# Patient Record
Sex: MALE | Race: WHITE | Employment: FULL TIME | URBAN - METROPOLITAN AREA
[De-identification: names, ages, dates, MRNs, and addresses within clinical notes are randomized per-mention and may not be internally consistent; named-entity substitution may affect disease eponyms.]

---

## 2017-03-16 ENCOUNTER — OFFICE VISIT (OUTPATIENT)
Dept: PRIMARY CARE CLINIC | Age: 72
End: 2017-03-16
Payer: COMMERCIAL

## 2017-03-16 VITALS
DIASTOLIC BLOOD PRESSURE: 62 MMHG | WEIGHT: 183.2 LBS | BODY MASS INDEX: 26.23 KG/M2 | RESPIRATION RATE: 16 BRPM | OXYGEN SATURATION: 98 % | HEART RATE: 57 BPM | SYSTOLIC BLOOD PRESSURE: 128 MMHG | HEIGHT: 70 IN

## 2017-03-16 DIAGNOSIS — I10 ESSENTIAL HYPERTENSION: ICD-10-CM

## 2017-03-16 DIAGNOSIS — E78.5 HYPERLIPIDEMIA, UNSPECIFIED HYPERLIPIDEMIA TYPE: ICD-10-CM

## 2017-03-16 DIAGNOSIS — E11.9 TYPE 2 DIABETES MELLITUS WITHOUT COMPLICATION, UNSPECIFIED LONG TERM INSULIN USE STATUS: Primary | ICD-10-CM

## 2017-03-16 LAB — HBA1C MFR BLD: 6.6 %

## 2017-03-16 PROCEDURE — 99214 OFFICE O/P EST MOD 30 MIN: CPT | Performed by: NURSE PRACTITIONER

## 2017-03-16 PROCEDURE — 83036 HEMOGLOBIN GLYCOSYLATED A1C: CPT | Performed by: NURSE PRACTITIONER

## 2017-03-16 ASSESSMENT — ENCOUNTER SYMPTOMS
SHORTNESS OF BREATH: 0
BLOOD IN STOOL: 0
NAUSEA: 0
TROUBLE SWALLOWING: 0
ABDOMINAL PAIN: 0
SORE THROAT: 0
DIARRHEA: 0
VOMITING: 0
SINUS PRESSURE: 0
CONSTIPATION: 0
COUGH: 0
WHEEZING: 0

## 2017-03-16 ASSESSMENT — PATIENT HEALTH QUESTIONNAIRE - PHQ9
SUM OF ALL RESPONSES TO PHQ QUESTIONS 1-9: 0
2. FEELING DOWN, DEPRESSED OR HOPELESS: 0
1. LITTLE INTEREST OR PLEASURE IN DOING THINGS: 0
SUM OF ALL RESPONSES TO PHQ9 QUESTIONS 1 & 2: 0

## 2017-05-03 RX ORDER — CARVEDILOL 6.25 MG/1
6.25 TABLET ORAL 2 TIMES DAILY WITH MEALS
Qty: 180 TABLET | Refills: 3 | Status: CANCELLED | OUTPATIENT
Start: 2017-05-03

## 2017-05-18 RX ORDER — GLIPIZIDE 10 MG/1
10 TABLET ORAL
Qty: 180 TABLET | Refills: 3 | Status: SHIPPED | OUTPATIENT
Start: 2017-05-18 | End: 2018-06-11 | Stop reason: SDUPTHER

## 2017-06-02 DIAGNOSIS — M54.10 BACK PAIN WITH LEFT-SIDED RADICULOPATHY: ICD-10-CM

## 2017-06-03 RX ORDER — NAPROXEN 500 MG/1
TABLET ORAL
Qty: 60 TABLET | Refills: 0 | Status: SHIPPED | OUTPATIENT
Start: 2017-06-03 | End: 2018-01-09 | Stop reason: SDUPTHER

## 2017-06-05 RX ORDER — CARVEDILOL 6.25 MG/1
6.25 TABLET ORAL 2 TIMES DAILY WITH MEALS
Qty: 180 TABLET | Refills: 3 | Status: SHIPPED | OUTPATIENT
Start: 2017-06-05 | End: 2018-06-27 | Stop reason: SDUPTHER

## 2017-06-05 RX ORDER — AMLODIPINE BESYLATE 10 MG/1
10 TABLET ORAL DAILY
Qty: 90 TABLET | Refills: 3 | Status: SHIPPED | OUTPATIENT
Start: 2017-06-05 | End: 2017-06-06 | Stop reason: SDUPTHER

## 2017-06-05 RX ORDER — LISINOPRIL 40 MG/1
40 TABLET ORAL DAILY
Qty: 90 TABLET | Refills: 3 | Status: SHIPPED | OUTPATIENT
Start: 2017-06-05 | End: 2017-06-06 | Stop reason: SDUPTHER

## 2017-06-06 RX ORDER — AMLODIPINE BESYLATE 10 MG/1
10 TABLET ORAL DAILY
Qty: 90 TABLET | Refills: 3 | Status: SHIPPED | OUTPATIENT
Start: 2017-06-06 | End: 2020-01-23 | Stop reason: ALTCHOICE

## 2017-06-06 RX ORDER — LISINOPRIL 40 MG/1
40 TABLET ORAL DAILY
Qty: 90 TABLET | Refills: 3 | Status: SHIPPED | OUTPATIENT
Start: 2017-06-06 | End: 2020-01-23 | Stop reason: ALTCHOICE

## 2017-06-15 RX ORDER — METFORMIN HYDROCHLORIDE 500 MG/1
TABLET, EXTENDED RELEASE ORAL
Qty: 30 TABLET | Refills: 5 | Status: SHIPPED | OUTPATIENT
Start: 2017-06-15 | End: 2017-06-20 | Stop reason: SDUPTHER

## 2017-06-20 RX ORDER — METFORMIN HYDROCHLORIDE 500 MG/1
500 TABLET, EXTENDED RELEASE ORAL
Qty: 30 TABLET | Refills: 5 | Status: SHIPPED | OUTPATIENT
Start: 2017-06-20 | End: 2017-12-29 | Stop reason: SDUPTHER

## 2017-06-22 ENCOUNTER — OFFICE VISIT (OUTPATIENT)
Dept: PRIMARY CARE CLINIC | Age: 72
End: 2017-06-22
Payer: COMMERCIAL

## 2017-06-22 VITALS
RESPIRATION RATE: 18 BRPM | HEART RATE: 56 BPM | BODY MASS INDEX: 26.69 KG/M2 | SYSTOLIC BLOOD PRESSURE: 142 MMHG | HEIGHT: 69 IN | WEIGHT: 180.2 LBS | DIASTOLIC BLOOD PRESSURE: 62 MMHG

## 2017-06-22 DIAGNOSIS — Z23 NEED FOR PROPHYLACTIC VACCINATION AGAINST STREPTOCOCCUS PNEUMONIAE (PNEUMOCOCCUS): ICD-10-CM

## 2017-06-22 DIAGNOSIS — E11.9 TYPE 2 DIABETES MELLITUS WITHOUT COMPLICATION, UNSPECIFIED LONG TERM INSULIN USE STATUS: Primary | ICD-10-CM

## 2017-06-22 DIAGNOSIS — I10 ESSENTIAL HYPERTENSION: ICD-10-CM

## 2017-06-22 DIAGNOSIS — Z12.5 SCREENING FOR PROSTATE CANCER: ICD-10-CM

## 2017-06-22 DIAGNOSIS — E78.5 HYPERLIPIDEMIA, UNSPECIFIED HYPERLIPIDEMIA TYPE: ICD-10-CM

## 2017-06-22 LAB — HBA1C MFR BLD: 7.1 %

## 2017-06-22 PROCEDURE — 83036 HEMOGLOBIN GLYCOSYLATED A1C: CPT | Performed by: NURSE PRACTITIONER

## 2017-06-22 PROCEDURE — 90670 PCV13 VACCINE IM: CPT | Performed by: NURSE PRACTITIONER

## 2017-06-22 PROCEDURE — 99214 OFFICE O/P EST MOD 30 MIN: CPT | Performed by: NURSE PRACTITIONER

## 2017-06-22 PROCEDURE — 90471 IMMUNIZATION ADMIN: CPT | Performed by: NURSE PRACTITIONER

## 2017-06-22 ASSESSMENT — ENCOUNTER SYMPTOMS
COUGH: 0
TROUBLE SWALLOWING: 0
DIARRHEA: 0
NAUSEA: 0
SINUS PRESSURE: 0
ABDOMINAL PAIN: 0
SORE THROAT: 0
WHEEZING: 0
SHORTNESS OF BREATH: 0
VOMITING: 0
BLOOD IN STOOL: 0
CONSTIPATION: 0

## 2017-06-25 DIAGNOSIS — E78.5 HYPERLIPIDEMIA: ICD-10-CM

## 2017-06-26 RX ORDER — ATORVASTATIN CALCIUM 40 MG/1
TABLET, FILM COATED ORAL
Qty: 30 TABLET | Refills: 0 | Status: SHIPPED | OUTPATIENT
Start: 2017-06-26 | End: 2017-07-22 | Stop reason: SDUPTHER

## 2017-07-22 DIAGNOSIS — E78.5 HYPERLIPIDEMIA: ICD-10-CM

## 2017-07-25 RX ORDER — ATORVASTATIN CALCIUM 40 MG/1
TABLET, FILM COATED ORAL
Qty: 30 TABLET | Refills: 3 | Status: SHIPPED | OUTPATIENT
Start: 2017-07-25 | End: 2017-11-22 | Stop reason: SDUPTHER

## 2017-07-27 ENCOUNTER — HOSPITAL ENCOUNTER (OUTPATIENT)
Age: 72
Discharge: HOME | End: 2017-07-27
Payer: COMMERCIAL

## 2017-07-27 DIAGNOSIS — Z12.5 SCREENING FOR PROSTATE CANCER: ICD-10-CM

## 2017-07-27 DIAGNOSIS — E78.5 HYPERLIPIDEMIA, UNSPECIFIED HYPERLIPIDEMIA TYPE: ICD-10-CM

## 2017-07-27 DIAGNOSIS — I10 ESSENTIAL HYPERTENSION: ICD-10-CM

## 2017-07-27 LAB
ALBUMIN SERPL-MCNC: 4.6 G/DL (ref 3.5–5.2)
ALBUMIN/GLOBULIN RATIO: 1.8 (ref 1–2.5)
ALP BLD-CCNC: 60 U/L (ref 40–129)
ALT SERPL-CCNC: 23 U/L (ref 5–41)
ANION GAP SERPL CALCULATED.3IONS-SCNC: 15 MMOL/L (ref 9–17)
AST SERPL-CCNC: 25 U/L
BILIRUB SERPL-MCNC: 0.85 MG/DL (ref 0.3–1.2)
BUN BLDV-MCNC: 16 MG/DL (ref 8–23)
BUN/CREAT BLD: ABNORMAL (ref 9–20)
CALCIUM SERPL-MCNC: 9.2 MG/DL (ref 8.6–10.4)
CHLORIDE BLD-SCNC: 100 MMOL/L (ref 98–107)
CHOLESTEROL/HDL RATIO: 2.7
CHOLESTEROL: 169 MG/DL
CO2: 27 MMOL/L (ref 20–31)
CREAT SERPL-MCNC: 0.73 MG/DL (ref 0.7–1.2)
GFR AFRICAN AMERICAN: >60 ML/MIN
GFR NON-AFRICAN AMERICAN: >60 ML/MIN
GFR SERPL CREATININE-BSD FRML MDRD: ABNORMAL ML/MIN/{1.73_M2}
GFR SERPL CREATININE-BSD FRML MDRD: ABNORMAL ML/MIN/{1.73_M2}
GLUCOSE BLD-MCNC: 105 MG/DL (ref 70–99)
HDLC SERPL-MCNC: 62 MG/DL
LDL CHOLESTEROL: 96 MG/DL (ref 0–130)
POTASSIUM SERPL-SCNC: 3.5 MMOL/L (ref 3.7–5.3)
PROSTATE SPECIFIC ANTIGEN: 0.68 UG/L
SODIUM BLD-SCNC: 142 MMOL/L (ref 135–144)
TOTAL PROTEIN: 7.2 G/DL (ref 6.4–8.3)
TRIGL SERPL-MCNC: 53 MG/DL
VLDLC SERPL CALC-MCNC: NORMAL MG/DL (ref 1–30)

## 2017-07-27 PROCEDURE — 80061 LIPID PANEL: CPT

## 2017-07-27 PROCEDURE — 36415 COLL VENOUS BLD VENIPUNCTURE: CPT

## 2017-07-27 PROCEDURE — 80053 COMPREHEN METABOLIC PANEL: CPT

## 2017-07-27 PROCEDURE — G0103 PSA SCREENING: HCPCS

## 2017-07-28 ENCOUNTER — TELEPHONE (OUTPATIENT)
Dept: PRIMARY CARE CLINIC | Age: 72
End: 2017-07-28

## 2017-07-28 DIAGNOSIS — E87.6 HYPOKALEMIA: Primary | ICD-10-CM

## 2017-08-17 ENCOUNTER — HOSPITAL ENCOUNTER (OUTPATIENT)
Age: 72
Discharge: HOME | End: 2017-08-17
Payer: COMMERCIAL

## 2017-08-17 ENCOUNTER — TELEPHONE (OUTPATIENT)
Dept: PRIMARY CARE CLINIC | Age: 72
End: 2017-08-17

## 2017-08-17 DIAGNOSIS — E87.6 HYPOKALEMIA: ICD-10-CM

## 2017-08-17 LAB — POTASSIUM SERPL-SCNC: 3.7 MMOL/L (ref 3.7–5.3)

## 2017-08-17 PROCEDURE — 36415 COLL VENOUS BLD VENIPUNCTURE: CPT

## 2017-08-17 PROCEDURE — 84132 ASSAY OF SERUM POTASSIUM: CPT

## 2017-09-29 ENCOUNTER — OFFICE VISIT (OUTPATIENT)
Dept: PRIMARY CARE CLINIC | Age: 72
End: 2017-09-29
Payer: COMMERCIAL

## 2017-09-29 ENCOUNTER — HOSPITAL ENCOUNTER (OUTPATIENT)
Age: 72
Setting detail: SPECIMEN
Discharge: HOME | End: 2017-09-29
Payer: MEDICARE

## 2017-09-29 VITALS
HEART RATE: 52 BPM | WEIGHT: 181.4 LBS | HEIGHT: 70 IN | SYSTOLIC BLOOD PRESSURE: 132 MMHG | BODY MASS INDEX: 25.97 KG/M2 | RESPIRATION RATE: 18 BRPM | DIASTOLIC BLOOD PRESSURE: 70 MMHG

## 2017-09-29 DIAGNOSIS — E78.5 HYPERLIPIDEMIA, UNSPECIFIED HYPERLIPIDEMIA TYPE: ICD-10-CM

## 2017-09-29 DIAGNOSIS — E11.9 TYPE 2 DIABETES MELLITUS WITHOUT COMPLICATION, UNSPECIFIED LONG TERM INSULIN USE STATUS: Primary | ICD-10-CM

## 2017-09-29 DIAGNOSIS — E11.9 TYPE 2 DIABETES MELLITUS WITHOUT COMPLICATION, UNSPECIFIED LONG TERM INSULIN USE STATUS: ICD-10-CM

## 2017-09-29 DIAGNOSIS — Z23 NEED FOR INFLUENZA VACCINATION: ICD-10-CM

## 2017-09-29 DIAGNOSIS — I10 ESSENTIAL HYPERTENSION: ICD-10-CM

## 2017-09-29 LAB
CREATININE URINE: 84.7 MG/DL (ref 39–259)
HBA1C MFR BLD: 6.4 %
MICROALBUMIN/CREAT 24H UR: 62 MG/L
MICROALBUMIN/CREAT UR-RTO: 73 MCG/MG CREAT

## 2017-09-29 PROCEDURE — 99214 OFFICE O/P EST MOD 30 MIN: CPT | Performed by: NURSE PRACTITIONER

## 2017-09-29 PROCEDURE — 90471 IMMUNIZATION ADMIN: CPT | Performed by: NURSE PRACTITIONER

## 2017-09-29 PROCEDURE — 83036 HEMOGLOBIN GLYCOSYLATED A1C: CPT | Performed by: NURSE PRACTITIONER

## 2017-09-29 PROCEDURE — 90688 IIV4 VACCINE SPLT 0.5 ML IM: CPT | Performed by: NURSE PRACTITIONER

## 2017-09-29 ASSESSMENT — ENCOUNTER SYMPTOMS
DIARRHEA: 0
CONSTIPATION: 0
SINUS PRESSURE: 0
NAUSEA: 0
BLOOD IN STOOL: 0
TROUBLE SWALLOWING: 0
VOMITING: 0
COUGH: 0
WHEEZING: 0
SORE THROAT: 0
SHORTNESS OF BREATH: 0
ABDOMINAL PAIN: 0

## 2017-10-02 DIAGNOSIS — E11.9 TYPE 2 DIABETES MELLITUS WITHOUT COMPLICATION, UNSPECIFIED LONG TERM INSULIN USE STATUS: Primary | ICD-10-CM

## 2017-10-03 ENCOUNTER — TELEPHONE (OUTPATIENT)
Dept: PRIMARY CARE CLINIC | Age: 72
End: 2017-10-03

## 2017-11-22 DIAGNOSIS — E78.5 HYPERLIPIDEMIA: ICD-10-CM

## 2017-11-27 RX ORDER — ATORVASTATIN CALCIUM 40 MG/1
TABLET, FILM COATED ORAL
Qty: 30 TABLET | Refills: 3 | Status: SHIPPED | OUTPATIENT
Start: 2017-11-27 | End: 2018-03-28 | Stop reason: SDUPTHER

## 2017-12-21 ENCOUNTER — TELEPHONE (OUTPATIENT)
Dept: PRIMARY CARE CLINIC | Age: 72
End: 2017-12-21

## 2017-12-29 ENCOUNTER — OFFICE VISIT (OUTPATIENT)
Dept: PRIMARY CARE CLINIC | Age: 72
End: 2017-12-29
Payer: COMMERCIAL

## 2017-12-29 VITALS
HEART RATE: 66 BPM | SYSTOLIC BLOOD PRESSURE: 128 MMHG | HEIGHT: 69 IN | WEIGHT: 185.8 LBS | BODY MASS INDEX: 27.52 KG/M2 | DIASTOLIC BLOOD PRESSURE: 78 MMHG | RESPIRATION RATE: 16 BRPM | OXYGEN SATURATION: 96 %

## 2017-12-29 DIAGNOSIS — R31.9 HEMATURIA, UNSPECIFIED TYPE: Primary | ICD-10-CM

## 2017-12-29 DIAGNOSIS — E11.9 TYPE 2 DIABETES MELLITUS WITHOUT COMPLICATION, WITHOUT LONG-TERM CURRENT USE OF INSULIN (HCC): ICD-10-CM

## 2017-12-29 LAB
BILIRUBIN, POC: NORMAL
BLOOD URINE, POC: NORMAL
CLARITY, POC: CLEAR
COLOR, POC: YELLOW
GLUCOSE URINE, POC: NORMAL
KETONES, POC: NORMAL
LEUKOCYTE EST, POC: NORMAL
NITRITE, POC: NORMAL
PH, POC: 8
PROTEIN, POC: NORMAL
SPECIFIC GRAVITY, POC: 1
UROBILINOGEN, POC: NORMAL

## 2017-12-29 PROCEDURE — 81003 URINALYSIS AUTO W/O SCOPE: CPT | Performed by: NURSE PRACTITIONER

## 2017-12-29 PROCEDURE — 99214 OFFICE O/P EST MOD 30 MIN: CPT | Performed by: NURSE PRACTITIONER

## 2017-12-29 RX ORDER — METFORMIN HYDROCHLORIDE 500 MG/1
500 TABLET, EXTENDED RELEASE ORAL
Qty: 30 TABLET | Refills: 5 | Status: SHIPPED | OUTPATIENT
Start: 2017-12-29 | End: 2018-02-08 | Stop reason: SDUPTHER

## 2017-12-29 ASSESSMENT — ENCOUNTER SYMPTOMS
SHORTNESS OF BREATH: 0
BACK PAIN: 0
ABDOMINAL PAIN: 0
COUGH: 0

## 2017-12-29 NOTE — PROGRESS NOTES
1 tablet by mouth 2 times daily (with meals) 180 tablet 3    cyclobenzaprine (FLEXERIL) 10 MG tablet Take 1 tablet by mouth every 8 hours as needed for Muscle spasms 270 tablet 2     No current facility-administered medications for this visit. No Known Allergies    Health Maintenance   Topic Date Due    AAA screen  1945    Diabetic retinal exam  01/30/1953    Zostavax vaccine  03/16/2018 (Originally 1/30/2003)    DTaP/Tdap/Td vaccine (1 - Tdap) 09/30/2019 (Originally 1/30/1962)    Diabetic foot exam  06/22/2018    Pneumococcal low/med risk (2 of 2 - PPSV23) 06/22/2018    Lipid screen  07/27/2018    Diabetic hemoglobin A1C test  09/29/2018    Diabetic microalbuminuria test  09/29/2018    Colon cancer screen colonoscopy  01/01/2024    Flu vaccine  Completed       Subjective:      Review of Systems   Constitutional: Negative for chills and fever. HENT: Negative for congestion. Eyes: Negative for visual disturbance. Respiratory: Negative for cough and shortness of breath. Cardiovascular: Negative for chest pain and palpitations. Gastrointestinal: Negative for abdominal pain. Genitourinary: Negative for difficulty urinating and dysuria. Musculoskeletal: Negative for arthralgias and back pain. Neurological: Negative for dizziness and headaches. Psychiatric/Behavioral: Negative for self-injury and sleep disturbance. Objective:     Physical Exam   Constitutional: He is oriented to person, place, and time. He appears well-developed and well-nourished. HENT:   Head: Normocephalic and atraumatic. Eyes: Pupils are equal, round, and reactive to light. Neck: Normal range of motion. Cardiovascular: Normal rate, regular rhythm and normal heart sounds. Pulmonary/Chest: Effort normal and breath sounds normal.   Abdominal: Soft. Bowel sounds are normal. There is no tenderness. Musculoskeletal: Normal range of motion.    Neurological: He is alert and oriented to person, place, and time. Skin: Skin is warm and dry. Psychiatric: He has a normal mood and affect. His behavior is normal. Judgment and thought content normal.   Nursing note and vitals reviewed. /78   Pulse 66   Resp 16   Ht 5' 9\" (1.753 m)   Wt 185 lb 12.8 oz (84.3 kg)   SpO2 96%   BMI 27.44 kg/m²     Assessment:      1. Hematuria, unspecified type  POCT Urinalysis No Micro (Auto)    Belkys Vanegas MD, Urology [facility]*   2. Type 2 diabetes mellitus without complication, without long-term current use of insulin (HCC)         Plan:      Return if symptoms worsen or fail to improve. 1. Hematuria- Rx given for referral to Dr. Roge León for further work up. Patient and wife state an understanding. Follow up as needed. Orders Placed This Encounter   Procedures   Rock Abimael MD, Urology [place]*     Referral Priority:   Routine     Referral Type:   Consult for Advice and Opinion     Referral Reason:   Specialty Services Required     Referred to Provider:   Zhane Rodney MD     Requested Specialty:   Urology     Number of Visits Requested:   1    POCT Urinalysis No Micro (Auto)     Orders Placed This Encounter   Medications    metFORMIN (GLUCOPHAGE-XR) 500 MG extended release tablet     Sig: Take 1 tablet by mouth daily (with breakfast)     Dispense:  30 tablet     Refill:  5       Patient given educational materials - see patient instructions. Discussed use, benefit, and side effects of prescribed medications. All patient questions answered. Pt voiced understanding. Reviewed health maintenance. Instructed to continue current medications, diet and exercise. Patient agreed with treatment plan. Follow up as directed.       Electronically signed by Abby Rivera CNP on 12/29/2017 at 12:00 PM

## 2018-01-09 ENCOUNTER — HOSPITAL ENCOUNTER (OUTPATIENT)
Age: 73
Setting detail: SPECIMEN
Discharge: HOME | End: 2018-01-09
Payer: COMMERCIAL

## 2018-01-09 ENCOUNTER — OFFICE VISIT (OUTPATIENT)
Dept: UROLOGY | Age: 73
End: 2018-01-09
Payer: COMMERCIAL

## 2018-01-09 VITALS
HEIGHT: 70 IN | WEIGHT: 181 LBS | BODY MASS INDEX: 25.91 KG/M2 | TEMPERATURE: 97.8 F | HEART RATE: 57 BPM | DIASTOLIC BLOOD PRESSURE: 89 MMHG | SYSTOLIC BLOOD PRESSURE: 166 MMHG

## 2018-01-09 DIAGNOSIS — R31.29 MICROHEMATURIA: Primary | ICD-10-CM

## 2018-01-09 LAB
-: NORMAL
AMORPHOUS: NORMAL
BACTERIA: NORMAL
BILIRUBIN URINE: NEGATIVE
CASTS UA: NORMAL /LPF (ref 0–8)
COLOR: YELLOW
CRYSTALS, UA: NORMAL /HPF
EPITHELIAL CELLS UA: NORMAL /HPF (ref 0–5)
GLUCOSE URINE: NEGATIVE
KETONES, URINE: NEGATIVE
LEUKOCYTE ESTERASE, URINE: NEGATIVE
MUCUS: NORMAL
NITRITE, URINE: NEGATIVE
OTHER OBSERVATIONS UA: NORMAL
PH UA: 7 (ref 5–8)
PROTEIN UA: NEGATIVE
RBC UA: NORMAL /HPF (ref 0–4)
RENAL EPITHELIAL, UA: NORMAL /HPF
SPECIFIC GRAVITY UA: 1.02 (ref 1–1.03)
TRICHOMONAS: NORMAL
TURBIDITY: CLEAR
URINE HGB: NEGATIVE
UROBILINOGEN, URINE: NORMAL
WBC UA: NORMAL /HPF (ref 0–5)
YEAST: NORMAL

## 2018-01-09 PROCEDURE — 99204 OFFICE O/P NEW MOD 45 MIN: CPT | Performed by: UROLOGY

## 2018-01-09 ASSESSMENT — ENCOUNTER SYMPTOMS
SHORTNESS OF BREATH: 0
BACK PAIN: 0
EYE PAIN: 0
NAUSEA: 0
COUGH: 0
ABDOMINAL PAIN: 0
VOMITING: 0
COLOR CHANGE: 0
EYE REDNESS: 0
WHEEZING: 0

## 2018-01-09 NOTE — PROGRESS NOTES
[facility]  [facility] - [place]  [address]  [address]  [address] [place] [id number]  Dept: [phone]  Dept Fax: [phone] [facility] Urology Office Note - New patient    Patient:  Celena Mcdaniels  YOB: 1945  Date: 1/9/2018    The patient is a 74 y.o. male who presents today for evaluation of the following problems:   Chief Complaint   Patient presents with    New Patient    Hematuria    referred by Giorgio Vaughan CNP. HPI  Here as a New Patient for hematuria noted on random urine done for employment. He has never seen visible blood in his urine. They saw PCP 12/29/17 showing trace blood. Former smoker- 21 pkg yr hx, quit 15-20yrs ago. He has Hx of kidney stone approx 4-5 yrs ago, pain went away without Tx. He has some frequency, nocturia x3, denies weak stream. No family Hx of bladder ,kidney or prostate cancer. His last PSA was 0.68- no family Hx of prostate cancer. He thinks he must have passed the stone. No previous urologic surgery. He does work around some cleaning products. He had a urinalysis, but no micro. (Patient's old records have been requested, reviewed and summarized in today's note.)    Summary of old records: N/A    Additional History: N/A    Procedures Today: N/A    Last several PSA's:  Lab Results   Component Value Date    PSA 0.68 07/27/2017    PSA 0.64 05/19/2016     Last total testosterone:  No results found for: TESTOSTERONE  Urinalysis today:  No results found for this visit on 01/09/18. AUA Symptom Score (1/9/2018):   INCOMPLETE EMPTYING: How often have you had the sensation of not emptying your bladder?: Less than Half the time  FREQUENCY: How often do you have to urinate less than every two hours?: Less than 1 to 5 times  INTERMITTENCY: How often have you found you stopped and started again several times when you urinated?: About Half the time  URGENCY: How often have you found it difficult to postpone urination?: Not at all  WEAK STREAM: How often have you had a weak urinary stream?: Not at all  STRAINING: How often have you had to strain to start  urination?: Not at all  NOCTURIA: How many times did you typically get up at night to uriniate?: 3 Times  TOTAL I-PSS SCORE[de-identified] 9  How would you feel if you were to spend the rest of your life with your urinary condition?: Mostly Satisfied    Last BUN and creatinine:  Lab Results   Component Value Date    BUN 16 07/27/2017     Lab Results   Component Value Date    CREATININE 0.73 07/27/2017       Additional Lab/Culture results: none    Imaging Reviewed during this Office Visit: none    (results were independently reviewed by physician and radiology report verified)    PAST MEDICAL, FAMILY AND SOCIAL HISTORY:  Past Medical History:   Diagnosis Date    Diabetes ([facility] [address]     Hyperlipidemia     Hypertension     Kidney stones      Past Surgical History:   Procedure Laterality Date    CARDIAC VALVE REPLACEMENT  [id number]    freestyle aortic root heart valve    COLON SURGERY      perforate during colonoscopy     Family History   Problem Relation Age of Onset    Diabetes Father      Outpatient Prescriptions Marked as Taking for the 1/9/18 encounter (Office Visit) with Michelle Arredondo MD   Medication Sig Dispense Refill    metFORMIN (GLUCOPHAGE-XR) 500 MG extended release tablet Take 1 tablet by mouth daily (with breakfast) 30 tablet 5    atorvastatin (LIPITOR) 40 MG tablet TAKE ONE TABLET BY MOUTH ONCE DAILY 30 tablet 3    amLODIPine (NORVASC) 10 MG tablet Take 1 tablet by mouth daily 90 tablet 3    lisinopril (PRINIVIL;ZESTRIL) 40 MG tablet Take 1 tablet by mouth daily 90 tablet 3    carvedilol (COREG) 6.25 MG tablet Take 1 tablet by mouth 2 times daily (with meals) 180 tablet 3    glipiZIDE (GLUCOTROL) 10 MG tablet Take 1 tablet by mouth 2 times daily (before meals) 180 tablet 3    naproxen (NAPROSYN) 500 MG tablet Take 1 tablet by mouth 2 times daily (with meals) 180 tablet 3   

## 2018-02-08 ENCOUNTER — OFFICE VISIT (OUTPATIENT)
Dept: PRIMARY CARE CLINIC | Age: 73
End: 2018-02-08
Payer: COMMERCIAL

## 2018-02-08 VITALS
HEIGHT: 70 IN | WEIGHT: 180 LBS | TEMPERATURE: 97.7 F | OXYGEN SATURATION: 98 % | DIASTOLIC BLOOD PRESSURE: 78 MMHG | HEART RATE: 60 BPM | SYSTOLIC BLOOD PRESSURE: 140 MMHG | BODY MASS INDEX: 25.77 KG/M2

## 2018-02-08 DIAGNOSIS — I10 ESSENTIAL HYPERTENSION: ICD-10-CM

## 2018-02-08 DIAGNOSIS — E11.9 TYPE 2 DIABETES MELLITUS WITHOUT COMPLICATION, UNSPECIFIED LONG TERM INSULIN USE STATUS: Primary | ICD-10-CM

## 2018-02-08 DIAGNOSIS — E78.5 HYPERLIPIDEMIA, UNSPECIFIED HYPERLIPIDEMIA TYPE: ICD-10-CM

## 2018-02-08 LAB — HBA1C MFR BLD: 7.2 %

## 2018-02-08 PROCEDURE — 83036 HEMOGLOBIN GLYCOSYLATED A1C: CPT | Performed by: NURSE PRACTITIONER

## 2018-02-08 PROCEDURE — 99214 OFFICE O/P EST MOD 30 MIN: CPT | Performed by: NURSE PRACTITIONER

## 2018-02-08 RX ORDER — METFORMIN HYDROCHLORIDE 1000 MG/1
1000 TABLET, FILM COATED, EXTENDED RELEASE ORAL
Qty: 90 TABLET | Refills: 3 | Status: SHIPPED | OUTPATIENT
Start: 2018-02-08 | End: 2018-02-15 | Stop reason: CLARIF

## 2018-02-08 RX ORDER — METFORMIN HYDROCHLORIDE 1000 MG/1
1000 TABLET, FILM COATED, EXTENDED RELEASE ORAL
Qty: 90 TABLET | Refills: 3 | Status: SHIPPED | OUTPATIENT
Start: 2018-02-08 | End: 2018-02-08 | Stop reason: SDUPTHER

## 2018-02-08 ASSESSMENT — ENCOUNTER SYMPTOMS
ABDOMINAL PAIN: 0
SHORTNESS OF BREATH: 0
COUGH: 0
CONSTIPATION: 0
SORE THROAT: 0
DIARRHEA: 0
WHEEZING: 0
NAUSEA: 0
TROUBLE SWALLOWING: 0
BLOOD IN STOOL: 0
VOMITING: 0
SINUS PRESSURE: 0

## 2018-02-12 RX ORDER — METFORMIN HYDROCHLORIDE 500 MG/1
500 TABLET, EXTENDED RELEASE ORAL
Qty: 30 TABLET | Refills: 5 | OUTPATIENT
Start: 2018-02-12

## 2018-02-13 NOTE — TELEPHONE ENCOUNTER
Pt's wife states that Insurance is requesting a Prior Auth on [place] and she is calling to check on status

## 2018-02-14 ENCOUNTER — TELEPHONE (OUTPATIENT)
Dept: PRIMARY CARE CLINIC | Age: 73
End: 2018-02-14

## 2018-02-14 NOTE — TELEPHONE ENCOUNTER
EstelaKasie ALVARO Srivastava  called stating that the Metformin Mod (Marva Ax) is $5,000 and needing a prior auth. Even with an approved prior auth, the medication will cost an extreme amount. Britta Coley is wanting to know if the standard Metformin can be filled   Please advise    Health Maintenance   Topic Date Due    AAA screen  1945    Diabetic retinal exam  01/30/1953    Zostavax vaccine  03/16/2018 (Originally 1/30/2003)    DTaP/Tdap/Td vaccine (1 - Tdap) 09/30/2019 (Originally 1/30/1962)    Diabetic foot exam  06/22/2018    Pneumococcal low/med risk (2 of 2 - PPSV23) 06/22/2018    Lipid screen  07/27/2018    Creatinine monitoring  07/27/2018    Potassium monitoring  08/17/2018    A1C test (Diabetic or Prediabetic)  02/08/2019    Colon cancer screen colonoscopy  01/01/2024    Flu vaccine  Completed             (applicable per patient's age: Cancer Screenings, Depression Screening, Fall Risk Screening, Immunizations)    Hemoglobin A1C (%)   Date Value   02/08/2018 7.2   09/29/2017 6.4   06/22/2017 7.1     Microalb/Crt.  Ratio (mcg/mg creat)   Date Value   09/29/2017 73 (H)     LDL Cholesterol (mg/dL)   Date Value   07/27/2017 96     AST (U/L)   Date Value   07/27/2017 25     ALT (U/L)   Date Value   07/27/2017 23     BUN (mg/dL)   Date Value   07/27/2017 16      (goal A1C is < 7)   (goal LDL is <100) need 30-50% reduction from baseline     BP Readings from Last 3 Encounters:   02/08/18 (!) 140/78   01/09/18 (!) 166/89   12/29/17 128/78    (goal /80)      All Future Testing planned in [facility]:  Lab Frequency Next Occurrence   Albumin, Random Urine Once 02/02/2018   Comprehensive Metabolic Panel Once 33/92/6318       Next Visit Date:  Future Appointments  Date Time Provider Bing Peterson   5/10/2018 9:00 AM [facility]er CNP [facility]            Patient Active Problem List:     Type 2 diabetes mellitus without complication ([address]     Hyperlipidemia     Essential hypertension

## 2018-03-28 DIAGNOSIS — E78.5 HYPERLIPIDEMIA: ICD-10-CM

## 2018-03-28 RX ORDER — ATORVASTATIN CALCIUM 40 MG/1
TABLET, FILM COATED ORAL
Qty: 30 TABLET | Refills: 3 | Status: SHIPPED | OUTPATIENT
Start: 2018-03-28 | End: 2018-08-14 | Stop reason: SDUPTHER

## 2018-06-11 RX ORDER — AMLODIPINE BESYLATE 10 MG/1
TABLET ORAL
Qty: 30 TABLET | Refills: 11 | Status: SHIPPED | OUTPATIENT
Start: 2018-06-11 | End: 2020-01-23 | Stop reason: ALTCHOICE

## 2018-06-11 RX ORDER — LISINOPRIL 40 MG/1
TABLET ORAL
Qty: 90 TABLET | Refills: 1 | Status: SHIPPED | OUTPATIENT
Start: 2018-06-11 | End: 2020-01-23

## 2018-06-11 RX ORDER — LISINOPRIL 40 MG/1
TABLET ORAL
Qty: 30 TABLET | Refills: 11 | Status: SHIPPED | OUTPATIENT
Start: 2018-06-11 | End: 2018-06-11 | Stop reason: SDUPTHER

## 2018-06-11 RX ORDER — GLIPIZIDE 10 MG/1
10 TABLET ORAL
Qty: 180 TABLET | Refills: 1 | Status: SHIPPED | OUTPATIENT
Start: 2018-06-11 | End: 2020-08-14 | Stop reason: SDUPTHER

## 2018-06-26 ENCOUNTER — OFFICE VISIT (OUTPATIENT)
Dept: PRIMARY CARE CLINIC | Age: 73
End: 2018-06-26
Payer: COMMERCIAL

## 2018-06-26 VITALS
RESPIRATION RATE: 18 BRPM | BODY MASS INDEX: 24.54 KG/M2 | DIASTOLIC BLOOD PRESSURE: 76 MMHG | HEIGHT: 70 IN | SYSTOLIC BLOOD PRESSURE: 138 MMHG | HEART RATE: 60 BPM | WEIGHT: 171.4 LBS

## 2018-06-26 DIAGNOSIS — E78.5 HYPERLIPIDEMIA, UNSPECIFIED HYPERLIPIDEMIA TYPE: ICD-10-CM

## 2018-06-26 DIAGNOSIS — E11.9 TYPE 2 DIABETES MELLITUS WITHOUT COMPLICATION, UNSPECIFIED LONG TERM INSULIN USE STATUS: Primary | ICD-10-CM

## 2018-06-26 DIAGNOSIS — Z13.29 SCREENING FOR THYROID DISORDER: ICD-10-CM

## 2018-06-26 DIAGNOSIS — I49.9 IRREGULAR HEART RATE: ICD-10-CM

## 2018-06-26 DIAGNOSIS — Z95.2 HISTORY OF AORTIC VALVE REPLACEMENT: ICD-10-CM

## 2018-06-26 DIAGNOSIS — Z23 NEED FOR PNEUMOCOCCAL VACCINATION: ICD-10-CM

## 2018-06-26 DIAGNOSIS — I10 ESSENTIAL HYPERTENSION: ICD-10-CM

## 2018-06-26 DIAGNOSIS — Z13.0 SCREENING FOR DEFICIENCY ANEMIA: ICD-10-CM

## 2018-06-26 LAB — HBA1C MFR BLD: 6.4 %

## 2018-06-26 PROCEDURE — 83036 HEMOGLOBIN GLYCOSYLATED A1C: CPT | Performed by: NURSE PRACTITIONER

## 2018-06-26 PROCEDURE — 99214 OFFICE O/P EST MOD 30 MIN: CPT | Performed by: NURSE PRACTITIONER

## 2018-06-26 PROCEDURE — 90471 IMMUNIZATION ADMIN: CPT | Performed by: NURSE PRACTITIONER

## 2018-06-26 PROCEDURE — 90732 PPSV23 VACC 2 YRS+ SUBQ/IM: CPT | Performed by: NURSE PRACTITIONER

## 2018-06-26 ASSESSMENT — ENCOUNTER SYMPTOMS
SINUS PRESSURE: 0
SORE THROAT: 0
TROUBLE SWALLOWING: 0
BLOOD IN STOOL: 0
COUGH: 0
NAUSEA: 0
VOMITING: 0
DIARRHEA: 0
WHEEZING: 0
SHORTNESS OF BREATH: 0
ABDOMINAL PAIN: 0
CONSTIPATION: 0

## 2018-06-26 ASSESSMENT — PATIENT HEALTH QUESTIONNAIRE - PHQ9
1. LITTLE INTEREST OR PLEASURE IN DOING THINGS: 0
SUM OF ALL RESPONSES TO PHQ9 QUESTIONS 1 & 2: 0
SUM OF ALL RESPONSES TO PHQ QUESTIONS 1-9: 0
2. FEELING DOWN, DEPRESSED OR HOPELESS: 0

## 2018-06-27 RX ORDER — CARVEDILOL 6.25 MG/1
6.25 TABLET ORAL 2 TIMES DAILY WITH MEALS
Qty: 180 TABLET | Refills: 1 | Status: SHIPPED | OUTPATIENT
Start: 2018-06-27 | End: 2020-01-23 | Stop reason: DRUGHIGH

## 2018-07-03 LAB
CHOLESTEROL, TOTAL: 149 MG/DL
CHOLESTEROL/HDL RATIO: 3.2
HDLC SERPL-MCNC: 46 MG/DL (ref 35–70)
LDL CHOLESTEROL CALCULATED: 91 MG/DL (ref 0–160)
MAGNESIUM: 1.8 MG/DL
TRIGL SERPL-MCNC: 53 MG/DL
TSH SERPL DL<=0.05 MIU/L-ACNC: 1.99 UIU/ML
VLDLC SERPL CALC-MCNC: 11 MG/DL

## 2018-07-05 DIAGNOSIS — I10 ESSENTIAL HYPERTENSION: ICD-10-CM

## 2018-07-05 DIAGNOSIS — Z13.29 SCREENING FOR THYROID DISORDER: ICD-10-CM

## 2018-07-05 DIAGNOSIS — I49.9 IRREGULAR HEART RATE: ICD-10-CM

## 2018-07-05 DIAGNOSIS — E11.9 TYPE 2 DIABETES MELLITUS WITHOUT COMPLICATION, UNSPECIFIED LONG TERM INSULIN USE STATUS: ICD-10-CM

## 2018-07-05 DIAGNOSIS — E78.5 HYPERLIPIDEMIA, UNSPECIFIED HYPERLIPIDEMIA TYPE: ICD-10-CM

## 2018-07-05 DIAGNOSIS — Z13.0 SCREENING FOR DEFICIENCY ANEMIA: ICD-10-CM

## 2018-08-14 DIAGNOSIS — E78.5 HYPERLIPIDEMIA: ICD-10-CM

## 2018-08-14 RX ORDER — ATORVASTATIN CALCIUM 40 MG/1
40 TABLET, FILM COATED ORAL DAILY
Qty: 30 TABLET | Refills: 3 | Status: SHIPPED | OUTPATIENT
Start: 2018-08-14 | End: 2018-12-21 | Stop reason: SDUPTHER

## 2018-08-14 NOTE — TELEPHONE ENCOUNTER
Pharmacy requests refill of lipitor, last office visit 6-26-18, approve or deny. Health Maintenance   Topic Date Due    AAA screen  1945    DTaP/Tdap/Td vaccine (1 - Tdap) 09/30/2019 (Originally 1/30/1962)    Flu vaccine (1) 09/01/2018    Shingles Vaccine (2 of 2 - 2 Dose Series) 11/09/2018    Diabetic foot exam  06/26/2019    A1C test (Diabetic or Prediabetic)  06/26/2019    Diabetic retinal exam  06/26/2019    Lipid screen  07/03/2019    Potassium monitoring  07/03/2019    Creatinine monitoring  07/03/2019    Colon cancer screen colonoscopy  01/01/2024    Pneumococcal low/med risk  Completed             (applicable per patient's age: Cancer Screenings, Depression Screening, Fall Risk Screening, Immunizations)    Hemoglobin A1C (%)   Date Value   06/26/2018 6.4   02/08/2018 7.2   09/29/2017 6.4     Microalb/Crt.  Ratio (mcg/mg creat)   Date Value   09/29/2017 73 (H)     LDL Cholesterol (mg/dL)   Date Value   07/27/2017 96     LDL Calculated (mg/dL)   Date Value   07/03/2018 91     AST (U/L)   Date Value   07/27/2017 25     ALT (U/L)   Date Value   07/27/2017 23     BUN (mg/dL)   Date Value   07/27/2017 16      (goal A1C is < 7)   (goal LDL is <100) need 30-50% reduction from baseline     BP Readings from Last 3 Encounters:   08/02/18 130/86   06/26/18 138/76   02/08/18 (!) 140/78    (goal /80)      All Future Testing planned in [facility]:  Lab Frequency Next Occurrence   Albumin, Random Urine Once 02/02/2018   ECHO Complete 2D W Doppler W Color Once 08/16/2018   Stress test, lexiscan Once 08/16/2018       Next Visit Date:  Future Appointments  Date Time Provider Bing Peterson   8/22/2018 7:30 AM SCHEDULE, [facility] [address] [facility] a   8/22/2018 8:15 AM SCHEDULE, [facility] NUCLEAR [facility] [address] [facility] a   8/30/2018 10:30 AM Marquis Fan MD [facility] [address] Heart a   10/29/2018 9:00 AM HUGO Espino - CNP [facility] [address] [facility] Patient Active Problem List:     Type 2 diabetes mellitus without complication (HCC)     Hyperlipidemia     Essential hypertension     History of aortic valve replacement     Irregular heart rate

## 2018-08-30 PROBLEM — Z95.2 HISTORY OF AORTIC VALVE REPLACEMENT: Chronic | Status: ACTIVE | Noted: 2018-06-26

## 2018-08-30 PROBLEM — I49.9 IRREGULAR HEART RATE: Chronic | Status: ACTIVE | Noted: 2018-06-26

## 2018-08-30 PROBLEM — I48.3 TYPICAL ATRIAL FLUTTER (HCC): Status: ACTIVE | Noted: 2018-08-30

## 2018-12-21 DIAGNOSIS — E78.5 HYPERLIPIDEMIA: ICD-10-CM

## 2018-12-25 RX ORDER — ATORVASTATIN CALCIUM 40 MG/1
TABLET, FILM COATED ORAL
Qty: 30 TABLET | Refills: 3 | Status: SHIPPED | OUTPATIENT
Start: 2018-12-25 | End: 2020-01-10 | Stop reason: SDUPTHER

## 2019-01-29 ENCOUNTER — TELEPHONE (OUTPATIENT)
Dept: PRIMARY CARE CLINIC | Age: 74
End: 2019-01-29

## 2019-01-30 ENCOUNTER — TELEPHONE (OUTPATIENT)
Dept: PRIMARY CARE CLINIC | Age: 74
End: 2019-01-30

## 2019-08-21 ENCOUNTER — TELEPHONE (OUTPATIENT)
Dept: PRIMARY CARE CLINIC | Age: 74
End: 2019-08-21

## 2019-10-11 ENCOUNTER — TELEPHONE (OUTPATIENT)
Dept: PRIMARY CARE CLINIC | Age: 74
End: 2019-10-11

## 2020-01-10 RX ORDER — ATORVASTATIN CALCIUM 40 MG/1
TABLET, FILM COATED ORAL
Qty: 90 TABLET | Refills: 3 | Status: SHIPPED | OUTPATIENT
Start: 2020-01-10 | End: 2020-06-11

## 2020-01-23 PROBLEM — I44.7 LEFT BUNDLE BRANCH BLOCK: Status: ACTIVE | Noted: 2020-01-23

## 2020-01-23 PROBLEM — I48.91 ATRIAL FIBRILLATION (HCC): Status: ACTIVE | Noted: 2020-01-23

## 2020-04-07 ENCOUNTER — TELEPHONE (OUTPATIENT)
Dept: PRIMARY CARE CLINIC | Age: 75
End: 2020-04-07

## 2020-04-07 RX ORDER — AMOXICILLIN AND CLAVULANATE POTASSIUM 875; 125 MG/1; MG/1
1 TABLET, FILM COATED ORAL 2 TIMES DAILY
Qty: 20 TABLET | Refills: 0 | Status: SHIPPED | OUTPATIENT
Start: 2020-04-07 | End: 2020-04-17

## 2020-04-07 RX ORDER — ECHINACEA PURPUREA EXTRACT 125 MG
1 TABLET ORAL PRN
Qty: 1 BOTTLE | Refills: 3 | Status: SHIPPED | OUTPATIENT
Start: 2020-04-07 | End: 2020-06-11 | Stop reason: ALTCHOICE

## 2020-04-07 NOTE — TELEPHONE ENCOUNTER
Please let him know I have sent in augmentin and saline nasal spray for him to use.  Also applying vaseline or neosporin to his nostrils can help with the bleeding/dryness  Thanks

## 2020-06-11 ENCOUNTER — OFFICE VISIT (OUTPATIENT)
Dept: PRIMARY CARE CLINIC | Age: 75
End: 2020-06-11
Payer: MEDICARE

## 2020-06-11 VITALS — SYSTOLIC BLOOD PRESSURE: 138 MMHG | WEIGHT: 182.4 LBS | DIASTOLIC BLOOD PRESSURE: 82 MMHG | BODY MASS INDEX: 25.44 KG/M2

## 2020-06-11 PROBLEM — I50.42 CHRONIC COMBINED SYSTOLIC AND DIASTOLIC CONGESTIVE HEART FAILURE (HCC): Status: ACTIVE | Noted: 2020-06-11

## 2020-06-11 LAB — HBA1C MFR BLD: 6.2 %

## 2020-06-11 PROCEDURE — 99214 OFFICE O/P EST MOD 30 MIN: CPT | Performed by: NURSE PRACTITIONER

## 2020-06-11 PROCEDURE — 1123F ACP DISCUSS/DSCN MKR DOCD: CPT | Performed by: NURSE PRACTITIONER

## 2020-06-11 PROCEDURE — 83036 HEMOGLOBIN GLYCOSYLATED A1C: CPT | Performed by: NURSE PRACTITIONER

## 2020-06-11 PROCEDURE — G8417 CALC BMI ABV UP PARAM F/U: HCPCS | Performed by: NURSE PRACTITIONER

## 2020-06-11 PROCEDURE — 1036F TOBACCO NON-USER: CPT | Performed by: NURSE PRACTITIONER

## 2020-06-11 PROCEDURE — G8427 DOCREV CUR MEDS BY ELIG CLIN: HCPCS | Performed by: NURSE PRACTITIONER

## 2020-06-11 PROCEDURE — 69210 REMOVE IMPACTED EAR WAX UNI: CPT | Performed by: NURSE PRACTITIONER

## 2020-06-11 PROCEDURE — 4040F PNEUMOC VAC/ADMIN/RCVD: CPT | Performed by: NURSE PRACTITIONER

## 2020-06-11 ASSESSMENT — ENCOUNTER SYMPTOMS
COUGH: 0
SORE THROAT: 0
NAUSEA: 0
SHORTNESS OF BREATH: 0
VOMITING: 0
BLOOD IN STOOL: 0
SINUS PRESSURE: 0
CONSTIPATION: 0
DIARRHEA: 0
WHEEZING: 0
TROUBLE SWALLOWING: 0
ABDOMINAL PAIN: 0

## 2020-06-11 ASSESSMENT — PATIENT HEALTH QUESTIONNAIRE - PHQ9
SUM OF ALL RESPONSES TO PHQ QUESTIONS 1-9: 0
2. FEELING DOWN, DEPRESSED OR HOPELESS: 0
SUM OF ALL RESPONSES TO PHQ9 QUESTIONS 1 & 2: 0
1. LITTLE INTEREST OR PLEASURE IN DOING THINGS: 0
SUM OF ALL RESPONSES TO PHQ QUESTIONS 1-9: 0

## 2020-06-11 NOTE — PROGRESS NOTES
wheezing. Abdominal:      General: Bowel sounds are normal. There is no distension. Palpations: Abdomen is soft. Musculoskeletal: Normal range of motion. Skin:     General: Skin is warm and dry. Neurological:      Mental Status: He is alert and oriented to person, place, and time. Psychiatric:         Behavior: Behavior normal.         Thought Content: Thought content normal.         Judgment: Judgment normal.       /82   Wt 182 lb 6.4 oz (82.7 kg)   BMI 25.44 kg/m²     Assessment:       Diagnosis Orders   1. Type 2 diabetes mellitus without complication, unspecified whether long term insulin use (HCC)  POCT glycosylated hemoglobin (Hb A1C)   2. Screening for hyperlipidemia     3. Screening, anemia, deficiency, iron     4. Impacted cerumen, left ear  09541 - DC REMOVE IMPACTED EAR WAX   5. Permanent atrial fibrillation     6. Chronic combined systolic and diastolic congestive heart failure ([facility] [address]               Plan:      Return in about 6 months (around 12/11/2020) for diabetes check, CHF. Orders Placed This Encounter   Procedures    POCT glycosylated hemoglobin (Hb A1C)    13181 - DC REMOVE IMPACTED EAR WAX      diabetes-well controlled on single agent, reviewed diet  Cerumen impaction-unable to remove with curet, irrigation moved blockage closer to opening, able to the remove cotton with large amount cerumen. Strongly adivsed to stop using cotton balls and/or q-tips  CHF, atrial fib-stable and well controlled on current meds, cont follow up with cardio as planned  Had recent labs while in hospital in January, will order any needed labs next visit   Patient given educational materials - see patient instructions. Discussed use, benefit, and side effects of prescribed medications. All patientquestions answered. Pt voiced understanding. Reviewed health maintenance. Instructedto continue current medications, diet and exercise. Patient agreed with treatmentplan. Follow up as directed.

## 2020-08-14 RX ORDER — GLIPIZIDE 10 MG/1
10 TABLET ORAL
Qty: 180 TABLET | Refills: 1 | Status: SHIPPED | OUTPATIENT
Start: 2020-08-14 | End: 2021-01-28

## 2020-08-14 RX ORDER — FUROSEMIDE 20 MG/1
20 TABLET ORAL 2 TIMES DAILY
Qty: 180 TABLET | Refills: 3 | Status: SHIPPED | OUTPATIENT
Start: 2020-08-14 | End: 2020-08-15 | Stop reason: SDUPTHER

## 2020-08-14 RX ORDER — CARVEDILOL 25 MG/1
25 TABLET ORAL 2 TIMES DAILY WITH MEALS
Qty: 180 TABLET | Refills: 1 | Status: SHIPPED | OUTPATIENT
Start: 2020-08-14 | End: 2021-01-28

## 2020-08-14 RX ORDER — SPIRONOLACTONE 25 MG/1
25 TABLET ORAL DAILY
Qty: 30 TABLET | Refills: 5 | Status: SHIPPED | OUTPATIENT
Start: 2020-08-14 | End: 2020-08-15 | Stop reason: SDUPTHER

## 2020-08-14 RX ORDER — SACUBITRIL AND VALSARTAN 24; 26 MG/1; MG/1
TABLET, FILM COATED ORAL
Qty: 60 TABLET | Refills: 3 | Status: SHIPPED | OUTPATIENT
Start: 2020-08-14

## 2020-08-14 NOTE — TELEPHONE ENCOUNTER
Last OV 06/11/2020    Health Maintenance   Topic Date Due    DTaP/Tdap/Td vaccine (1 - Tdap) 01/30/1962    Potassium monitoring  07/03/2019    Creatinine monitoring  07/03/2019    Annual Wellness Visit (AWV)  10/13/2019    Flu vaccine (1) 09/01/2020    Shingles Vaccine  Completed    Pneumococcal 65+ years Vaccine  Completed    Hepatitis A vaccine  Aged Out    Hib vaccine  Aged Out    Meningococcal (ACWY) vaccine  Aged Out             (applicable per patient's age: Cancer Screenings, Depression Screening, Fall Risk Screening, Immunizations)    Hemoglobin A1C (%)   Date Value   06/11/2020 6.2   06/26/2018 6.4   02/08/2018 7.2     Microalb/Crt.  Ratio (mcg/mg creat)   Date Value   09/29/2017 73 (H)     LDL Cholesterol (mg/dL)   Date Value   07/27/2017 96     LDL Calculated (mg/dL)   Date Value   07/03/2018 91     AST (U/L)   Date Value   07/27/2017 25     ALT (U/L)   Date Value   07/27/2017 23     BUN (mg/dL)   Date Value   07/27/2017 16      (goal A1C is < 7)   (goal LDL is <100) need 30-50% reduction from baseline     BP Readings from Last 3 Encounters:   06/11/20 110/70   06/11/20 138/82   02/06/20 (!) 140/80    (goal /80)      All Future Testing planned in [facility]:  Lab Frequency Next Occurrence       Next Visit Date:  Future Appointments   Date Time Provider Bing Peterson   12/15/2020 10:40 AM HUGO Lawler - CNP [facility]            Patient Active Problem List:     Type 2 diabetes mellitus without complication ([facility] [address]     Hyperlipidemia     Essential hypertension     History of aortic valve replacement     Irregular heart rate     Atrial fibrillation (HCC)     Left bundle branch block     Chronic combined systolic and diastolic congestive heart failure ([address]

## 2020-09-14 NOTE — TELEPHONE ENCOUNTER
Patient was getting from [address] [facility] but no longer going to [address] [facility]. Last OV 6/11/2020      Health Maintenance   Topic Date Due    DTaP/Tdap/Td vaccine (1 - Tdap) 01/30/1962    Potassium monitoring  07/03/2019    Creatinine monitoring  07/03/2019    Annual Wellness Visit (AWV)  10/13/2019    Flu vaccine (1) 09/01/2020    Shingles Vaccine  Completed    Pneumococcal 65+ years Vaccine  Completed    Hepatitis A vaccine  Aged Out    Hib vaccine  Aged Out    Meningococcal (ACWY) vaccine  Aged Out             (applicable per patient's age: Cancer Screenings, Depression Screening, Fall Risk Screening, Immunizations)    Hemoglobin A1C (%)   Date Value   06/11/2020 6.2   06/26/2018 6.4   02/08/2018 7.2     Microalb/Crt.  Ratio (mcg/mg creat)   Date Value   09/29/2017 73 (H)     LDL Cholesterol (mg/dL)   Date Value   07/27/2017 96     LDL Calculated (mg/dL)   Date Value   07/03/2018 91     AST (U/L)   Date Value   07/27/2017 25     ALT (U/L)   Date Value   07/27/2017 23     BUN (mg/dL)   Date Value   07/27/2017 16      (goal A1C is < 7)   (goal LDL is <100) need 30-50% reduction from baseline     BP Readings from Last 3 Encounters:   06/11/20 110/70   06/11/20 138/82   02/06/20 (!) 140/80    (goal /80)      All Future Testing planned in [facility]:  Lab Frequency Next Occurrence       Next Visit Date:  Future Appointments   Date Time Provider Bing Peterson   12/15/2020 10:40 AM [facility]            Patient Active Problem List:     Type 2 diabetes mellitus without complication ([facility] [address]     Hyperlipidemia     Essential hypertension     History of aortic valve replacement     Irregular heart rate     Atrial fibrillation (HCC)     Left bundle branch block     Chronic combined systolic and diastolic congestive heart failure ([facility] [address]

## 2020-12-15 ENCOUNTER — OFFICE VISIT (OUTPATIENT)
Dept: PRIMARY CARE CLINIC | Age: 75
End: 2020-12-15
Payer: COMMERCIAL

## 2020-12-15 VITALS
SYSTOLIC BLOOD PRESSURE: 136 MMHG | BODY MASS INDEX: 27.38 KG/M2 | DIASTOLIC BLOOD PRESSURE: 70 MMHG | WEIGHT: 190.8 LBS | OXYGEN SATURATION: 97 %

## 2020-12-15 PROBLEM — I49.9 IRREGULAR HEART RATE: Chronic | Status: RESOLVED | Noted: 2018-06-26 | Resolved: 2020-12-15

## 2020-12-15 LAB — HBA1C MFR BLD: 7.5 %

## 2020-12-15 PROCEDURE — G8484 FLU IMMUNIZE NO ADMIN: HCPCS | Performed by: NURSE PRACTITIONER

## 2020-12-15 PROCEDURE — G8417 CALC BMI ABV UP PARAM F/U: HCPCS | Performed by: NURSE PRACTITIONER

## 2020-12-15 PROCEDURE — 3051F HG A1C>EQUAL 7.0%<8.0%: CPT | Performed by: NURSE PRACTITIONER

## 2020-12-15 PROCEDURE — 4040F PNEUMOC VAC/ADMIN/RCVD: CPT | Performed by: NURSE PRACTITIONER

## 2020-12-15 PROCEDURE — 83036 HEMOGLOBIN GLYCOSYLATED A1C: CPT | Performed by: NURSE PRACTITIONER

## 2020-12-15 PROCEDURE — 1036F TOBACCO NON-USER: CPT | Performed by: NURSE PRACTITIONER

## 2020-12-15 PROCEDURE — 1123F ACP DISCUSS/DSCN MKR DOCD: CPT | Performed by: NURSE PRACTITIONER

## 2020-12-15 PROCEDURE — G8427 DOCREV CUR MEDS BY ELIG CLIN: HCPCS | Performed by: NURSE PRACTITIONER

## 2020-12-15 PROCEDURE — 99214 OFFICE O/P EST MOD 30 MIN: CPT | Performed by: NURSE PRACTITIONER

## 2020-12-15 ASSESSMENT — ENCOUNTER SYMPTOMS
BLOOD IN STOOL: 0
WHEEZING: 0
CONSTIPATION: 0
TROUBLE SWALLOWING: 0
SORE THROAT: 0
VOMITING: 0
SINUS PRESSURE: 0
NAUSEA: 0
DIARRHEA: 0
ABDOMINAL PAIN: 0
COUGH: 0
SHORTNESS OF BREATH: 0

## 2020-12-15 NOTE — PROGRESS NOTES
[facility]  [address]   [address]  [address] [address]  Dept: [phone]  Dept Fax: [phone]    Candace Hui is a 66 y.o. male who presentstoday for his medical conditions/complaints as noted below. 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 He has type 2 diabetes mellitus. His disease course has been stable. There are no hypoglycemic associated symptoms. Pertinent negatives for hypoglycemia include no confusion, dizziness, headaches or nervousness/anxiousness. Pertinent negatives for diabetes include no chest pain, no fatigue, no polydipsia, no polyphagia, no polyuria and no weakness. Current diabetic treatment includes diet and oral agent (triple therapy). He is compliant with treatment most of the time. His weight is stable. He is following a generally healthy diet. He participates in exercise daily. His home blood glucose trend is increasing steadily. An ACE inhibitor/angiotensin II receptor blocker is being taken. Hemoglobin A1C (%)   Date Value   12/15/2020 7.5   06/11/2020 6.2   06/26/2018 6.4             ( goal A1C is < 7)   Microalb/Crt.  Ratio (mcg/mg creat)   Date Value   09/29/2017 73 (H)     LDL Cholesterol (mg/dL)   Date Value   07/27/2017 96   05/19/2016 196 (H)     LDL Calculated (mg/dL)   Date Value   07/03/2018 91       (goal LDL is <100)   AST (U/L)   Date Value   07/27/2017 25     ALT (U/L)   Date Value   07/27/2017 23     BUN (mg/dL)   Date Value   07/27/2017 16     BP Readings from Last 3 Encounters:   12/15/20 136/70   06/11/20 110/70   06/11/20 138/82          ([id number])    Past Medical History:   Diagnosis Date    Diabetes ([facility] [address]     Hyperlipidemia     Hypertension     Kidney stones       Past Surgical History:   Procedure Laterality Date    CARDIAC VALVE REPLACEMENT  [id number]    freestyle aortic root heart valve    COLON SURGERY      perforate during colonoscopy       Family History   Problem Relation Age of Onset    Diabetes Father           Social History     Tobacco Use    Smoking status: Former Smoker     Packs/day: 1.00     Years: 20.00     Pack years: 20.00    Smokeless tobacco: Former User     Quit date: 5/12/1977   Substance Use Topics    Alcohol use: No     Alcohol/week: 0.0 standard drinks     Comment: very rarely      Current Outpatient Medications   Medication Sig Dispense Refill    metFORMIN (GLUCOPHAGE) 500 MG tablet Take 1 tablet by mouth 2 times daily (with meals) 180 tablet 3    spironolactone (ALDACTONE) 25 MG tablet Take 1 tablet by mouth daily 90 tablet 3    rivaroxaban (XARELTO) 20 MG TABS tablet Take 1 tablet by mouth daily (with breakfast) 90 tablet 3    furosemide (LASIX) 20 MG tablet Take 1 tablet by mouth 2 times daily 180 tablet 3    dapagliflozin (FARXIGA) 10 MG tablet Take 1 tablet by mouth every morning 90 tablet 3    sacubitril-valsartan (ENTRESTO) 24-26 MG per tablet TAKE 1 TABLET BY MOUTH TWICE DAILY FOR 21 DAYS 60 tablet 3    carvedilol (COREG) 25 MG tablet Take 1 tablet by mouth 2 times daily (with meals) 180 tablet 1    glipiZIDE (GLUCOTROL) 10 MG tablet Take 1 tablet by mouth 2 times daily (before meals) 180 tablet 1     No current facility-administered medications for this visit.       No Known Allergies    Health Maintenance   Topic Date Due    DTaP/Tdap/Td vaccine (1 - Tdap) 01/30/1962    Potassium monitoring  07/03/2019    Creatinine monitoring  07/03/2019    Annual Wellness Visit (AWV)  10/13/2019    Flu vaccine (1) 09/01/2020    Shingles Vaccine  Completed    Pneumococcal 65+ years Vaccine  Completed    Hepatitis A vaccine  Aged Out    Hib vaccine  Aged Out    Meningococcal (ACWY) vaccine  Aged Out       Subjective:      Review of Systems   Constitutional: Negative for activity change, appetite change, chills, fatigue, fever and unexpected weight change. HENT: Negative for congestion, ear pain, hearing loss, sinus pressure, sore throat and trouble swallowing. Eyes: Negative for visual disturbance. Respiratory: Negative for cough, shortness of breath and wheezing. Cardiovascular: Negative for chest pain, palpitations and leg swelling. Gastrointestinal: Negative for abdominal pain, blood in stool, constipation, diarrhea, nausea and vomiting. Endocrine: Negative for cold intolerance, heat intolerance, polydipsia, polyphagia and polyuria. Genitourinary: Negative for difficulty urinating, frequency, hematuria and urgency. Musculoskeletal: Positive for arthralgias. Negative for myalgias. Skin: Negative for rash. Allergic/Immunologic: Negative for environmental allergies. Neurological: Negative for dizziness, weakness, light-headedness and headaches. Psychiatric/Behavioral: Positive for sleep disturbance. Negative for confusion. The patient is not nervous/anxious. Objective:     Physical Exam  Constitutional:       Appearance: He is well-developed. HENT:      Head: Normocephalic. Eyes:      Conjunctiva/sclera: Conjunctivae normal.      Pupils: Pupils are equal, round, and reactive to light. Neck:      Musculoskeletal: Normal range of motion. Cardiovascular:      Rate and Rhythm: Normal rate and regular rhythm. Heart sounds: Normal heart sounds.  No murmur. Pulmonary:      Effort: Pulmonary effort is normal.      Breath sounds: Normal breath sounds. No wheezing. Abdominal:      General: Bowel sounds are normal. There is no distension. Palpations: Abdomen is soft. Musculoskeletal: Normal range of motion. Skin:     General: Skin is warm and dry. Neurological:      Mental Status: He is alert and oriented to person, place, and time. Psychiatric:         Behavior: Behavior normal.         Thought Content: Thought content normal.         Judgment: Judgment normal.       /70   Wt 190 lb 12.8 oz (86.5 kg)   SpO2 97%   BMI 27.38 kg/m²     Assessment:       Diagnosis Orders   1. Type 2 diabetes mellitus without complication, unspecified whether long term insulin use (HCC)  POCT glycosylated hemoglobin (Hb A1C)    metFORMIN (GLUCOPHAGE) 500 MG tablet    Comprehensive Metabolic Panel    Lipid Panel   2. Essential hypertension  Comprehensive Metabolic Panel    Lipid Panel   3. Chronic combined systolic and diastolic congestive heart failure ([address]     4. Permanent atrial fibrillation ([address]     5. Mixed hyperlipidemia  Comprehensive Metabolic Panel    Lipid Panel   6. Screening for diabetes mellitus     7. Screening for malignant neoplasm of prostate  Psa screening   8. Screening, anemia, deficiency, iron  CBC Auto Differential   9. Difficulty sleeping               Plan:      Return in about 6 months (around 6/15/2021) for diabetes check.     DiabetesA1c has increased, will increase Metformin to twice daily, reviewed appropriate diet choices, due for annual labs  Hypertensionwell-controlled on current medication  CHF, atrial fibstable on current medications, continue regular follow-up as directed with cardiologist  Hyperlipidemiahas been stable on statin, past due for lipid panel  Difficulty sleepingtreatment options discussed, Benadryl is effective for him so encouraged to use as needed, also encouraged trying other over-the-counter agents like Tylenol PM, explained the active ingredient is Benadryl along with the pain relieving agent. Discussed if over-the-counter agents become no longer helpful prescription options can then be addressed  Orders Placed This Encounter   Procedures    CBC Auto Differential     Standing Status:   Future     Standing Expiration Date:   12/15/2021    Comprehensive Metabolic Panel     Standing Status:   Future     Standing Expiration Date:   12/15/2021    Lipid Panel     Standing Status:   Future     Standing Expiration Date:   12/15/2021     Order Specific Question:   Is Patient Fasting?/# of Hours     Answer:   8    Psa screening     Standing Status:   Future     Standing Expiration Date:   12/15/2021    POCT glycosylated hemoglobin (Hb A1C)        Orders Placed This Encounter   Medications    metFORMIN (GLUCOPHAGE) 500 MG tablet     Sig: Take 1 tablet by mouth 2 times daily (with meals)     Dispense:  180 tablet     Refill:  3       Patient given educational materials - see patient instructions. Discussed use, benefit, and side effects of prescribed medications. All patientquestions answered. Pt voiced understanding. Reviewed health maintenance. Instructedto continue current medications, diet and exercise. Patient agreed with treatmentplan. Follow up as directed.      Electronicallysigned by HUGO Ordoñez CNP on 12/15/2020 at 4:41 PM

## 2021-01-28 RX ORDER — CARVEDILOL 25 MG/1
TABLET ORAL
Qty: 180 TABLET | Refills: 1 | Status: SHIPPED | OUTPATIENT
Start: 2021-01-28 | End: 2021-07-27

## 2021-01-28 RX ORDER — GLIPIZIDE 10 MG/1
TABLET ORAL
Qty: 180 TABLET | Refills: 1 | Status: SHIPPED | OUTPATIENT
Start: 2021-01-28 | End: 2021-07-27

## 2021-01-28 NOTE — TELEPHONE ENCOUNTER
Last OV 12/15/20  Health Maintenance   Topic Date Due    Hepatitis C screen  1945    COVID-19 Vaccine (1 of 2) 01/30/1959    DTaP/Tdap/Td vaccine (1 - Tdap) 01/30/1962    Potassium monitoring  07/03/2019    Creatinine monitoring  07/03/2019    Annual Wellness Visit (AWV)  10/13/2019    Flu vaccine (1) 09/01/2020    Shingles Vaccine  Completed    Pneumococcal 65+ years Vaccine  Completed    Hepatitis A vaccine  Aged Out    Hib vaccine  Aged Out    Meningococcal (ACWY) vaccine  Aged Out             (applicable per patient's age: Cancer Screenings, Depression Screening, Fall Risk Screening, Immunizations)    Hemoglobin A1C (%)   Date Value   12/15/2020 7.5   06/11/2020 6.2   06/26/2018 6.4     Microalb/Crt.  Ratio (mcg/mg creat)   Date Value   09/29/2017 73 (H)     LDL Cholesterol (mg/dL)   Date Value   07/27/2017 96     LDL Calculated (mg/dL)   Date Value   07/03/2018 91     AST (U/L)   Date Value   07/27/2017 25     ALT (U/L)   Date Value   07/27/2017 23     BUN (mg/dL)   Date Value   07/27/2017 16      (goal A1C is < 7)   (goal LDL is <100) need 30-50% reduction from baseline     BP Readings from Last 3 Encounters:   12/15/20 136/70   06/11/20 110/70   06/11/20 138/82    (goal /80)      All Future Testing planned in [facility]:  Lab Frequency Next Occurrence   CBC Auto Differential Once 01/15/2021   Comprehensive Metabolic Panel Once 99/46/6226   Lipid Panel Once 01/15/2021   Psa screening Once 01/15/2021       Next Visit Date:  Future Appointments   Date Time Provider Bing Peterson   6/15/2021  9:40 AM Tom Jung APRN - CNP [id number]            Patient Active Problem List:     Type 2 diabetes mellitus without complication ([facility] [address]     Hyperlipidemia     Essential hypertension     History of aortic valve replacement     Atrial fibrillation (HCC)     Left bundle branch block     Chronic combined systolic and diastolic congestive heart failure ([facility] [address]

## 2021-06-04 ENCOUNTER — TELEPHONE (OUTPATIENT)
Dept: PRIMARY CARE CLINIC | Age: 76
End: 2021-06-04

## 2021-06-04 NOTE — LETTER
[place], [address] [facility]  [phone]      6/4/2021      [facility]      Dear Andee Saint      Due to an unforseen conflict, HUGO Quiroz CNP will not be in the office on the day of your scheduled appointment 6/15/2021 . It is neccessary to cancel your your appointment. Please call our office as soon as possible so that we may re-schedule your appointment. We recognize that everyone's time is valuable and we sincerely apologize for the inconvenience.         Thank You for your understanding      [facility]

## 2021-07-27 RX ORDER — CARVEDILOL 25 MG/1
TABLET ORAL
Qty: 180 TABLET | Refills: 1 | Status: SHIPPED | OUTPATIENT
Start: 2021-07-27 | End: 2022-02-07 | Stop reason: SDUPTHER

## 2021-07-27 RX ORDER — GLIPIZIDE 10 MG/1
TABLET ORAL
Qty: 180 TABLET | Refills: 1 | Status: SHIPPED | OUTPATIENT
Start: 2021-07-27 | End: 2022-01-28 | Stop reason: SDUPTHER

## 2021-10-11 ENCOUNTER — OFFICE VISIT (OUTPATIENT)
Dept: FAMILY MEDICINE CLINIC | Age: 76
End: 2021-10-11
Payer: MEDICARE

## 2021-10-11 ENCOUNTER — NURSE TRIAGE (OUTPATIENT)
Dept: OTHER | Facility: CLINIC | Age: 76
End: 2021-10-11

## 2021-10-11 VITALS
BODY MASS INDEX: 27.26 KG/M2 | HEART RATE: 61 BPM | DIASTOLIC BLOOD PRESSURE: 84 MMHG | SYSTOLIC BLOOD PRESSURE: 124 MMHG | OXYGEN SATURATION: 96 % | WEIGHT: 190 LBS | RESPIRATION RATE: 14 BRPM | TEMPERATURE: 97.3 F

## 2021-10-11 DIAGNOSIS — G89.29 CHRONIC LEFT-SIDED LOW BACK PAIN WITH LEFT-SIDED SCIATICA: Primary | ICD-10-CM

## 2021-10-11 DIAGNOSIS — M54.42 CHRONIC LEFT-SIDED LOW BACK PAIN WITH LEFT-SIDED SCIATICA: Primary | ICD-10-CM

## 2021-10-11 PROCEDURE — G8417 CALC BMI ABV UP PARAM F/U: HCPCS | Performed by: NURSE PRACTITIONER

## 2021-10-11 PROCEDURE — 1123F ACP DISCUSS/DSCN MKR DOCD: CPT | Performed by: NURSE PRACTITIONER

## 2021-10-11 PROCEDURE — G8427 DOCREV CUR MEDS BY ELIG CLIN: HCPCS | Performed by: NURSE PRACTITIONER

## 2021-10-11 PROCEDURE — 4040F PNEUMOC VAC/ADMIN/RCVD: CPT | Performed by: NURSE PRACTITIONER

## 2021-10-11 PROCEDURE — G8484 FLU IMMUNIZE NO ADMIN: HCPCS | Performed by: NURSE PRACTITIONER

## 2021-10-11 PROCEDURE — 99213 OFFICE O/P EST LOW 20 MIN: CPT | Performed by: NURSE PRACTITIONER

## 2021-10-11 PROCEDURE — 1036F TOBACCO NON-USER: CPT | Performed by: NURSE PRACTITIONER

## 2021-10-11 RX ORDER — LIDOCAINE 50 MG/G
1 PATCH TOPICAL DAILY
Qty: 30 PATCH | Refills: 0 | Status: SHIPPED | OUTPATIENT
Start: 2021-10-11 | End: 2021-11-10

## 2021-10-11 RX ORDER — MENTHOL 40 MG/ML
1 GEL TOPICAL 3 TIMES DAILY PRN
Qty: 1 EACH | Refills: 2 | Status: SHIPPED | OUTPATIENT
Start: 2021-10-11 | End: 2021-11-10

## 2021-10-11 ASSESSMENT — PATIENT HEALTH QUESTIONNAIRE - PHQ9
SUM OF ALL RESPONSES TO PHQ QUESTIONS 1-9: 0
SUM OF ALL RESPONSES TO PHQ QUESTIONS 1-9: 0
SUM OF ALL RESPONSES TO PHQ9 QUESTIONS 1 & 2: 0
2. FEELING DOWN, DEPRESSED OR HOPELESS: 0
1. LITTLE INTEREST OR PLEASURE IN DOING THINGS: 0
SUM OF ALL RESPONSES TO PHQ QUESTIONS 1-9: 0

## 2021-10-11 ASSESSMENT — ENCOUNTER SYMPTOMS: BACK PAIN: 1

## 2021-10-11 NOTE — TELEPHONE ENCOUNTER
Received call from   at [facility] with [employer] Complaint. Brief description of triage: unable to triage pt not with caller     Transferred to Ryann Howard  for scheduling  Offered to  Merge pt in spouse said he is not able to speak he is at work    Care advice provided, patient verbalizes understanding; denies any other questions or concerns; instructed to call back for any new or worsening symptoms. Attention Provider: Thank you for allowing me to participate in the care of your patient. The patient was connected to triage in response to information provided to the ECC/PSC. Please do not respond through this encounter as the response is not directed to a shared pool. Reason for Disposition   Nursing judgment    Answer Assessment - Initial Assessment Questions  1. REASON FOR CALL or QUESTION: \"What is your reason for calling today? \" or \"How can I best help you? \" or \"What question do you have that I can help answer? \"      Spouse is calling to make an appointment for her  with back pain    He is not with her he is at work    Protocols used: [address] - NO TRIAGE-ADULT-[place]

## 2021-10-11 NOTE — PATIENT INSTRUCTIONS
Patient Education        Low Back Pain: Exercises  Introduction  Here are some examples of exercises for you to try. The exercises may be suggested for a condition or for rehabilitation. Start each exercise slowly. Ease off the exercises if you start to have pain. You will be told when to start these exercises and which ones will work best for you. How to do the exercises  Press-up    1. Lie on your stomach, supporting your body with your forearms. 2. Press your elbows down into the floor to raise your upper back. As you do this, relax your stomach muscles and allow your back to arch without using your back muscles. As your press up, do not let your hips or pelvis come off the floor. 3. Hold for 15 to 30 seconds, then relax. 4. Repeat 2 to 4 times. Alternate arm and leg (bird dog) exercise    Do this exercise slowly. Try to keep your body straight at all times, and do not let one hip drop lower than the other. 1. Start on the floor, on your hands and knees. 2. Tighten your belly muscles. 3. Raise one leg off the floor, and hold it straight out behind you. Be careful not to let your hip drop down, because that will twist your trunk. 4. Hold for about 6 seconds, then lower your leg and switch to the other leg. 5. Repeat 8 to 12 times on each leg. 6. Over time, work up to holding for 10 to 30 seconds each time. 7. If you feel stable and secure with your leg raised, try raising the opposite arm straight out in front of you at the same time. Knee-to-chest exercise    1. Lie on your back with your knees bent and your feet flat on the floor. 2. Bring one knee to your chest, keeping the other foot flat on the floor (or keeping the other leg straight, whichever feels better on your lower back). 3. Keep your lower back pressed to the floor. Hold for at least 15 to 30 seconds. 4. Relax, and lower the knee to the starting position. 5. Repeat with the other leg. Repeat 2 to 4 times with each leg.   6. To get more stretch, put your other leg flat on the floor while pulling your knee to your chest.  Curl-ups    1. Lie on the floor on your back with your knees bent at a 90-degree angle. Your feet should be flat on the floor, about 12 inches from your buttocks. 2. Cross your arms over your chest. If this bothers your neck, try putting your hands behind your neck (not your head), with your elbows spread apart. 3. Slowly tighten your belly muscles and raise your shoulder blades off the floor. 4. Keep your head in line with your body, and do not press your chin to your chest.  5. Hold this position for 1 or 2 seconds, then slowly lower yourself back down to the floor. 6. Repeat 8 to 12 times. Pelvic tilt exercise    1. Lie on your back with your knees bent. 2. \"Brace\" your stomach. This means to tighten your muscles by pulling in and imagining your belly button moving toward your spine. You should feel like your back is pressing to the floor and your hips and pelvis are rocking back. 3. Hold for about 6 seconds while you breathe smoothly. 4. Repeat 8 to 12 times. Heel dig bridging    1. Lie on your back with both knees bent and your ankles bent so that only your heels are digging into the floor. Your knees should be bent about 90 degrees. 2. Then push your heels into the floor, squeeze your buttocks, and lift your hips off the floor until your shoulders, hips, and knees are all in a straight line. 3. Hold for about 6 seconds as you continue to breathe normally, and then slowly lower your hips back down to the floor and rest for up to 10 seconds. 4. Do 8 to 12 repetitions. Hamstring stretch in doorway    1. Lie on your back in a doorway, with one leg through the open door. 2. Slide your leg up the wall to straighten your knee. You should feel a gentle stretch down the back of your leg. 3. Hold the stretch for at least 15 to 30 seconds. Do not arch your back, point your toes, or bend either knee.  Keep one heel touching the floor and the other heel touching the wall. 4. Repeat with your other leg. 5. Do 2 to 4 times for each leg. Hip flexor stretch    1. Kneel on the floor with one knee bent and one leg behind you. Place your forward knee over your foot. Keep your other knee touching the floor. 2. Slowly push your hips forward until you feel a stretch in the upper thigh of your rear leg. 3. Hold the stretch for at least 15 to 30 seconds. Repeat with your other leg. 4. Do 2 to 4 times on each side. Wall sit    1. Stand with your back 10 to 12 inches away from a wall. 2. Lean into the wall until your back is flat against it. 3. Slowly slide down until your knees are slightly bent, pressing your lower back into the wall. 4. Hold for about 6 seconds, then slide back up the wall. 5. Repeat 8 to 12 times. Follow-up care is a key part of your treatment and safety. Be sure to make and go to all appointments, and call your doctor if you are having problems. It's also a good idea to know your test results and keep a list of the medicines you take. Where can you learn more? Go to https://[employer].CityVoz. org and sign in to your [email or web address] account. Enter I079 in the Datadog box to learn more about \"Low Back Pain: Exercises. \"     If you do not have an account, please click on the \"Sign Up Now\" link. Current as of: July 1, 2021               Content Version: 13.0  © 2006-2021 Healthwise, Incorporated. Care instructions adapted under license by [facility] ([facility]). If you have questions about a medical condition or this instruction, always ask your healthcare professional. [place] [address] any warranty or liability for your use of this information. Patient Education        Sciatica: Exercises  Introduction  Here are some examples of typical rehabilitation exercises for your condition. Start each exercise slowly. Ease off the exercise if you start to have pain.   Your doctor or physical therapist will tell you when you can start these exercises and which ones will work best for you. When you are not being active, find a comfortable position for rest. Some people are comfortable on the floor or a medium-firm bed with a small pillow under their head and another under their knees. Some people prefer to lie on their side with a pillow between their knees. Don't stay in one position for too long. Take short walks (10 to 20 minutes) every 2 to 3 hours. Avoid slopes, hills, and stairs until you feel better. Walk only distances you can manage without pain, especially leg pain. How to do the exercises  Back stretches    1. Get down on your hands and knees on the floor. 2. Relax your head and allow it to droop. Round your back up toward the ceiling until you feel a nice stretch in your upper, middle, and lower back. Hold this stretch for as long as it feels comfortable, or about 15 to 30 seconds. 3. Return to the starting position with a flat back while you are on your hands and knees. 4. Let your back sway by pressing your stomach toward the floor. Lift your buttocks toward the ceiling. 5. Hold this position for 15 to 30 seconds. 6. Repeat 2 to 4 times. Follow-up care is a key part of your treatment and safety. Be sure to make and go to all appointments, and call your doctor if you are having problems. It's also a good idea to know your test results and keep a list of the medicines you take. Where can you learn more? Go to https://Mobile[employer]art[employer].Cardeeo. org and sign in to your VivaSmart account. Enter R950 in the [facility] box to learn more about \"Sciatica: Exercises. \"     If you do not have an account, please click on the \"Sign Up Now\" link. Current as of: July 1, 2021               Content Version: 13.0  © 7216-5503 Healthwise, Incorporated. Care instructions adapted under license by [facility] ([facility]).  If you have questions about a medical condition or this instruction, always ask your healthcare professional. [place] [address] any warranty or liability for your use of this information. Patient Education        Sciatica: Care Instructions  Your Care Instructions     Sciatica (say \"yzt-LJ-ox-kuh\") is an irritation of one of the sciatic nerves, which come from the spinal cord in the lower back. The sciatic nerves and their branches extend down through the buttock to the foot. Sciatica can develop when an injured disc in the back irritates or presses against a spinal nerve root. Its main symptom is pain, numbness, or weakness that is often worse in the leg or foot than in the back. Sciatica often will improve and go away with time. Early treatment usually includes medicines and exercises to relieve pain. Follow-up care is a key part of your treatment and safety. Be sure to make and go to all appointments, and call your doctor if you are having problems. It's also a good idea to know your test results and keep a list of the medicines you take. How can you care for yourself at home? · Take pain medicines exactly as directed. ? If the doctor gave you a prescription medicine for pain, take it as prescribed. ? If you are not taking a prescription pain medicine, ask your doctor if you can take an over-the-counter medicine. · Use heat or ice to relieve pain. ? To apply heat, put a warm water bottle, heating pad set on low, or warm cloth on your back. Do not go to sleep with a heating pad on your skin. ? To use ice, put ice or a cold pack on the area for 10 to 20 minutes at a time. Put a thin cloth between the ice and your skin. · Avoid sitting if possible, unless it feels better than standing. · Alternate lying down with short walks. Increase your walking distance as you are able to without making your symptoms worse. · Do not do anything that makes your symptoms worse. When should you call for help?    Call 911 anytime you think you may need emergency care. For example, call if:    · You are unable to move a leg at all. Call your doctor now or seek immediate medical care if:    · You have new or worse symptoms in your legs or buttocks. Symptoms may include:  ? Numbness or tingling. ? Weakness. ? Pain.     · You lose bladder or bowel control. Watch closely for changes in your health, and be sure to contact your doctor if:    · You are not getting better as expected. Where can you learn more? Go to https://[employer]. org and sign in to your PayClip account. Enter [phone] in the [facility] box to learn more about \"Sciatica: Care Instructions. \"     If you do not have an account, please click on the \"Sign Up Now\" link. Current as of: July 1, 2021               Content Version: 13.0  © 3578-4615 Healthwise, Incorporated. Care instructions adapted under license by [facility] ([facility]). If you have questions about a medical condition or this instruction, always ask your healthcare professional. [place] [address] any warranty or liability for your use of this information. Tylenol 650 mg tablets, take one tablet by mouth every 8 hours as needed for pain.  May take two tablets for severe pain

## 2021-10-11 NOTE — PROGRESS NOTES
[address] WALK-IN  [address] [place]  Dept: [phone]  Dept Fax: [phone]    Date of Visit:  10/11/2021  Patient Name: Cynthia Brunson   Patient :  1945     CHIEF COMPLAINT:     Cynthia Brunson is a 64 y.o. male who presents today is c/o of Back Pain (lower back pain - hard getting up in the AM. Has back support brace on currently.) and Leg Pain (left leg pain. ongoing problem.)          REVIEW OF SYSTEM      Review of Systems   Musculoskeletal: Positive for back pain. Low back pain with radiation down his left buttocks to his knee   All other systems reviewed and are negative. HISTORY OF PRESENT ILLNESS     Mr. Xavier Phillip presents to the walk-in today with c/o low back pain, he had trouble getting out of bed today to go to work He has had lower back pain on and off for years. Today he has pain that starts in his back and radiates down his leg today. He has not taken anything for the pain. His wife questions if the back pain can cause him to be incontinent of urine, since he has had this pain he has been incontinent, he is taking a diuretic and moving slower and this could be why. He did agree he tried to get to the bathroom and moved so slowly he did not make it. Discussed options, and due to his history of heart failure, he is not a good candidate for steroid injection or tapering dose therapy, Discussed physical therapy and pain interventions that александр help, such as lidocaine patches and Biofreeze Gel. Also discussed benefit of physical therapy and he was open to therapy. He preferred a therapist that was near him in [place]. He denies further concerns.        REVIEWED INFORMATION      No Known Allergies    Patient Active Problem List   Diagnosis    Type 2 diabetes mellitus without complication ([address]    Hyperlipidemia    Essential hypertension    History of aortic valve replacement    Atrial fibrillation ([address]    Left bundle branch block    Chronic combined systolic and diastolic congestive heart failure (HCC)    Chronic left-sided low back pain with left-sided sciatica       Past Medical History:   Diagnosis Date    Diabetes ([address]     Hyperlipidemia     Hypertension     Kidney stones        Past Surgical History:   Procedure Laterality Date    CARDIAC VALVE REPLACEMENT  [id number]    freestyle aortic root heart valve    COLON SURGERY      perforate during colonoscopy            PHYSICAL EXAM     /84 (Site: Right Upper Arm, Position: Sitting, Cuff Size: Large Adult)   Pulse 61   Temp 97.3 °F (36.3 °C) (Temporal)   Resp 14   Wt 190 lb (86.2 kg)   SpO2 96%   BMI 27.26 kg/m²        Physical Exam  Constitutional:       Appearance: Normal appearance. Cardiovascular:      Rate and Rhythm: Normal rate and regular rhythm. Pulses: Normal pulses. Heart sounds: Normal heart sounds. Pulmonary:      Effort: Pulmonary effort is normal.      Breath sounds: Normal breath sounds. Abdominal:      Palpations: Abdomen is soft. Musculoskeletal:      Lumbar back: Tenderness present. Back:    Skin:     General: Skin is warm and dry. Neurological:      Mental Status: He is alert. Results for orders placed or performed in visit on 12/15/20   POCT glycosylated hemoglobin (Hb A1C)   Result Value Ref Range    Hemoglobin A1C 7.5 %         ASSESSMENT/PLAN       After history and assessment will treat patient for low back pain with sciatica. Referral sent to Physical therapy. Lidocaine patches and Biofreeze ordered to manage pain. Printed exercises that he can do to help stretch his back muscles. Patient counseled:     Patient given educational materials - see patient instructions. Discussed use, benefit, and side effects of prescribed medications. All patient questions answered. Pt verbalized understanding. Instructed to continue current medications, diet and exercise.   Patient agreed with treatment plan. Follow up as directed. 1. Chronic left-sided low back pain with left-sided sciatica    - lidocaine (LIDODERM) 5 %; Place 1 patch onto the skin daily 12 hours on, 12 hours off. Dispense: 30 patch; Refill: 0    - Menthol, Topical Analgesic, (BIOFREEZE) 4 % GEL; Apply 1 Dose topically 3 times daily as needed (low back pain)  Dispense: 1 each; Refill: 2    - External Referral To Physical Therapy        Orders Placed This Encounter   Medications    lidocaine (LIDODERM) 5 %     Sig: Place 1 patch onto the skin daily 12 hours on, 12 hours off. Dispense:  30 patch     Refill:  0    Menthol, Topical Analgesic, (BIOFREEZE) 4 % GEL     Sig: Apply 1 Dose topically 3 times daily as needed (low back pain)     Dispense:  1 each     Refill:  2        Return if symptoms worsen or fail to improve and  Scheduled an appointment with your PCP in one month .     COMMUNICATION:       Electronically signed by HUGO Lockwood CNP on 10/11/2021 at 4:47 PM

## 2021-11-02 ENCOUNTER — TELEPHONE (OUTPATIENT)
Dept: PRIMARY CARE CLINIC | Age: 76
End: 2021-11-02

## 2021-11-16 ENCOUNTER — TELEPHONE (OUTPATIENT)
Dept: PRIMARY CARE CLINIC | Age: 76
End: 2021-11-16

## 2022-01-28 RX ORDER — GLIPIZIDE 10 MG/1
TABLET ORAL
Qty: 180 TABLET | Refills: 1 | Status: SHIPPED | OUTPATIENT
Start: 2022-01-28 | End: 2022-07-26

## 2022-01-28 NOTE — TELEPHONE ENCOUNTER
----- Message from Manoj Roland sent at 1/28/2022 12:58 PM EST -----  Subject: Refill Request    QUESTIONS  Name of Medication? glipiZIDE (GLUCOTROL) 10 MG tablet  Patient-reported dosage and instructions? two per day with meals   How many days do you have left? 4  Preferred Pharmacy? [facility] phone number (if available)? [phone]  ---------------------------------------------------------------------------  --------------  Natalie RUELAS  What is the best way for the office to contact you? OK to leave message on   voicemail  Preferred Call Back Phone Number?  [phone]

## 2022-02-07 RX ORDER — CARVEDILOL 25 MG/1
TABLET ORAL
Qty: 180 TABLET | Refills: 0 | Status: SHIPPED | OUTPATIENT
Start: 2022-02-07 | End: 2022-05-09

## 2022-02-17 ENCOUNTER — OFFICE VISIT (OUTPATIENT)
Dept: PRIMARY CARE CLINIC | Age: 77
End: 2022-02-17
Payer: MEDICARE

## 2022-02-17 VITALS
DIASTOLIC BLOOD PRESSURE: 86 MMHG | HEART RATE: 59 BPM | RESPIRATION RATE: 16 BRPM | SYSTOLIC BLOOD PRESSURE: 130 MMHG | BODY MASS INDEX: 28.53 KG/M2 | OXYGEN SATURATION: 98 % | HEIGHT: 69 IN | WEIGHT: 192.6 LBS

## 2022-02-17 DIAGNOSIS — Z00.00 INITIAL MEDICARE ANNUAL WELLNESS VISIT: Primary | ICD-10-CM

## 2022-02-17 DIAGNOSIS — I48.21 PERMANENT ATRIAL FIBRILLATION (HCC): ICD-10-CM

## 2022-02-17 DIAGNOSIS — Z13.0 SCREENING, ANEMIA, DEFICIENCY, IRON: ICD-10-CM

## 2022-02-17 DIAGNOSIS — E11.9 TYPE 2 DIABETES MELLITUS WITHOUT COMPLICATION, UNSPECIFIED WHETHER LONG TERM INSULIN USE (HCC): ICD-10-CM

## 2022-02-17 DIAGNOSIS — E78.2 MIXED HYPERLIPIDEMIA: Chronic | ICD-10-CM

## 2022-02-17 DIAGNOSIS — G89.29 CHRONIC PAIN OF BOTH KNEES: ICD-10-CM

## 2022-02-17 DIAGNOSIS — I10 ESSENTIAL HYPERTENSION: Chronic | ICD-10-CM

## 2022-02-17 DIAGNOSIS — M25.562 CHRONIC PAIN OF BOTH KNEES: ICD-10-CM

## 2022-02-17 DIAGNOSIS — M25.561 CHRONIC PAIN OF BOTH KNEES: ICD-10-CM

## 2022-02-17 DIAGNOSIS — I50.42 CHRONIC COMBINED SYSTOLIC AND DIASTOLIC CONGESTIVE HEART FAILURE (HCC): ICD-10-CM

## 2022-02-17 LAB — HBA1C MFR BLD: 7.5 %

## 2022-02-17 PROCEDURE — 1123F ACP DISCUSS/DSCN MKR DOCD: CPT | Performed by: NURSE PRACTITIONER

## 2022-02-17 PROCEDURE — 83036 HEMOGLOBIN GLYCOSYLATED A1C: CPT | Performed by: NURSE PRACTITIONER

## 2022-02-17 PROCEDURE — G8482 FLU IMMUNIZE ORDER/ADMIN: HCPCS | Performed by: NURSE PRACTITIONER

## 2022-02-17 PROCEDURE — 4040F PNEUMOC VAC/ADMIN/RCVD: CPT | Performed by: NURSE PRACTITIONER

## 2022-02-17 PROCEDURE — 3051F HG A1C>EQUAL 7.0%<8.0%: CPT | Performed by: NURSE PRACTITIONER

## 2022-02-17 PROCEDURE — G0438 PPPS, INITIAL VISIT: HCPCS | Performed by: NURSE PRACTITIONER

## 2022-02-17 RX ORDER — TRAMADOL HYDROCHLORIDE 50 MG/1
50 TABLET ORAL EVERY 8 HOURS PRN
Qty: 21 TABLET | Refills: 0 | Status: SHIPPED | OUTPATIENT
Start: 2022-02-17 | End: 2022-03-28

## 2022-02-17 SDOH — ECONOMIC STABILITY: FOOD INSECURITY: WITHIN THE PAST 12 MONTHS, YOU WORRIED THAT YOUR FOOD WOULD RUN OUT BEFORE YOU GOT MONEY TO BUY MORE.: NEVER TRUE

## 2022-02-17 SDOH — ECONOMIC STABILITY: FOOD INSECURITY: WITHIN THE PAST 12 MONTHS, THE FOOD YOU BOUGHT JUST DIDN'T LAST AND YOU DIDN'T HAVE MONEY TO GET MORE.: NEVER TRUE

## 2022-02-17 ASSESSMENT — PATIENT HEALTH QUESTIONNAIRE - PHQ9
SUM OF ALL RESPONSES TO PHQ9 QUESTIONS 1 & 2: 0
SUM OF ALL RESPONSES TO PHQ QUESTIONS 1-9: 0
SUM OF ALL RESPONSES TO PHQ QUESTIONS 1-9: 0
2. FEELING DOWN, DEPRESSED OR HOPELESS: 0
SUM OF ALL RESPONSES TO PHQ QUESTIONS 1-9: 0
SUM OF ALL RESPONSES TO PHQ QUESTIONS 1-9: 0
1. LITTLE INTEREST OR PLEASURE IN DOING THINGS: 0

## 2022-02-17 ASSESSMENT — SOCIAL DETERMINANTS OF HEALTH (SDOH): HOW HARD IS IT FOR YOU TO PAY FOR THE VERY BASICS LIKE FOOD, HOUSING, MEDICAL CARE, AND HEATING?: NOT HARD AT ALL

## 2022-02-17 ASSESSMENT — LIFESTYLE VARIABLES: HOW MANY STANDARD DRINKS CONTAINING ALCOHOL DO YOU HAVE ON A TYPICAL DAY: 1 OR 2

## 2022-02-17 NOTE — PROGRESS NOTES
Medicare Annual Wellness Visit    Amirah Guzman is here for Medicare [address]  was seen today for medicare awv. Diagnoses and all orders for this visit:    Initial Medicare annual wellness visit    Type 2 diabetes mellitus without complication, unspecified whether long term insulin use ([facility]  -     POCT glycosylated hemoglobin (Hb A1C)  -     Comprehensive Metabolic Panel; Future  -     SITagliptin (JANUVIA) 100 MG tablet; Take 1 tablet by mouth daily  Only treatment at this time is glipizide as unable to afford Robb . Will try Januvia. Urged to call office if still not affordable. Reviewed diet and need to eliminate pop and juices which are his main beverages. He appears motivated to make changes    Mixed hyperlipidemia  -     Comprehensive Metabolic Panel; Future    Chronic combined systolic and diastolic congestive heart failure (HCC)    Permanent atrial fibrillation (HCC)    Essential hypertension  -     Comprehensive Metabolic Panel; Future    Chronic pain of both knees  Offered x-rays but he is not interested at this time. He continues to work in physically active job at age 76, discussed a certain amount of arthritic-like pain to be expected. Advised safe use of tramadol for severe pain only mainly at at bedtime, diclofenac gel, continue over-the-counter Tylenol as needed. He is not a candidate for NSAIDs due to anticoagulation tx  -     traMADol (ULTRAM) 50 MG tablet; Take 1 tablet by mouth every 8 hours as needed for Pain for up to 7 days. -     diclofenac sodium (VOLTAREN) 1 % GEL;  Apply 4 g topically 4 times daily    Screening, anemia, deficiency, iron  -     CBC with Auto Differential; Future     Recommendations for Preventive Services Due: see orders and patient instructions/AVS.  Recommended screening schedule for the next 5-10 years is provided to the patient in written form: see Patient Instructions/AVS.     Return in 3 months (on 5/17/2022) for diabetes, Medicare Annual Wellness Visit in 1 year. Reviewed and updated this visit by clinical staff:  Tobacco  Allergies  Meds  Problems  Med Hx  Surg Hx  Soc Hx  Fam Hx        Subjective   The following acute and/or chronic problems were also addressed today:  Complains of bilateral knee and thigh pain that seems to be worsening, he works as  in hospital. States has a lot of stiffness after prolonged sitting    Patient's complete Health Risk Assessment and screening values have been reviewed and are found in [employer]. The following problems were reviewed today and where indicated follow up appointments were made and/or referrals ordered.     Positive Risk Factor Screenings with Interventions:               General Health and ACP:  General  In general, how would you say your health is?: Fair  In the past 7 days, have you experienced any of the following: New or Increased Pain, New or Increased Fatigue, Loneliness, Social Isolation, Stress or Anger?: (!) Yes  Select all that apply: (!) New or Increased Pain (legs)  Do you get the social and emotional support that you need?: Yes  Do you have a Living Will?: Yes    Advance Directives     Power of  Living Will ACP-Advance Directive ACP-Power of     Not on File Not on File Not on File Not on File      General Health Risk Interventions:  · discussed bilateral knee and leg pain    Health Habits/Nutrition:     Physical Activity: Insufficiently Active    Days of Exercise per Week: 2 days    Minutes of Exercise per Session: 10 min     Have you lost any weight without trying in the past 3 months?: No    Body mass index: (!) 28.44    Have you seen the dentist within the past year?: (!) No      Health Habits/Nutrition Interventions:  · Dental exam overdue:  patient encouraged to make appointment with his/her dentist    Hearing/Vision:  No exam data present  Hearing/Vision  Do you or your family notice any trouble with your hearing that hasn't been managed with hearing aids?: No  Do you have difficulty driving, watching TV, or doing any of your daily activities because of your eyesight?: No  Have you had an eye exam within the past year?: (!) No    Hearing/Vision Interventions:  · Vision concerns:  patient encouraged to make appointment with his/her eye specialist         Objective         Heart rate irregular with chronic a-fib, lungs clear bilaterally,no swelling      No Known Allergies  Prior to Visit Medications    Medication Sig Taking? Authorizing Provider   traMADol (ULTRAM) 50 MG tablet Take 1 tablet by mouth every 8 hours as needed for Pain for up to 7 days.  Yes HUGO Aguilar CNP   diclofenac sodium (VOLTAREN) 1 % GEL Apply 4 g topically 4 times daily Yes HUGO Villarreal CNP   SITagliptin (JANUVIA) 100 MG tablet Take 1 tablet by mouth daily Yes HUGO Villarreal CNP   carvedilol (COREG) 25 MG tablet TAKE 1 TABLET TWICE DAILY  WITH MEALS Yes HUGO Aguilar CNP   glipiZIDE (GLUCOTROL) 10 MG tablet TAKE 1 TABLET TWICE A DAY  BEFORE MEALS Yes HUGO Aguilar CNP   rivaroxaban (XARELTO) 20 MG TABS tablet Take 1 tablet by mouth daily (with breakfast) Yes Teresa Cowart MD   furosemide (LASIX) 20 MG tablet Take 1 tablet by mouth 2 times daily  Patient taking differently: Take 40 mg by mouth daily  Yes Teresa Cowart MD   sacubitril-valsartan (ENTRESTO) 24-26 MG per tablet TAKE 1 TABLET BY MOUTH TWICE DAILY FOR 21 DAYS  Patient not taking: Reported on 2/17/2022  HUGO Villarreal CNP       [facility] (Including outside providers/suppliers regularly involved in providing care):   Patient Care Team:  HUGO Villarreal CNP as PCP - General (Family Nurse Practitioner)  HUGO Villarreal CNP as PCP - [facility] Empaneled Provider

## 2022-02-17 NOTE — PATIENT INSTRUCTIONS
Personalized Preventive Plan for Codie Valdivia - 2/17/2022  Medicare offers a range of preventive health benefits. Some of the tests and screenings are paid in full while other may be subject to a deductible, co-insurance, and/or copay. Some of these benefits include a comprehensive review of your medical history including lifestyle, illnesses that may run in your family, and various assessments and screenings as appropriate. After reviewing your medical record and screening and assessments performed today your provider may have ordered immunizations, labs, imaging, and/or referrals for you. A list of these orders (if applicable) as well as your Preventive Care list are included within your After Visit Summary for your review. Other Preventive Recommendations:    · A preventive eye exam performed by an eye specialist is recommended every 1-2 years to screen for glaucoma; cataracts, macular degeneration, and other eye disorders. · A preventive dental visit is recommended every 6 months. · Try to get at least 150 minutes of exercise per week or 10,000 steps per day on a pedometer . · Order or download the FREE \"Exercise & Physical Activity: Your Everyday Guide\" from [employer]. Call 1-688.368.1582 or search [employer] online. · You need 3213-2247 mg of calcium and 3817-3016 IU of vitamin D per day. It is possible to meet your calcium requirement with diet alone, but a vitamin D supplement is usually necessary to meet this goal.  · When exposed to the sun, use a sunscreen that protects against both UVA and UVB radiation with an SPF of 30 or greater. Reapply every 2 to 3 hours or after sweating, drying off with a towel, or swimming. · Always wear a seat belt when traveling in a car. Always wear a helmet when riding a bicycle or motorcycle.

## 2022-03-26 DIAGNOSIS — G89.29 CHRONIC PAIN OF BOTH KNEES: ICD-10-CM

## 2022-03-26 DIAGNOSIS — M25.561 CHRONIC PAIN OF BOTH KNEES: ICD-10-CM

## 2022-03-26 DIAGNOSIS — M25.562 CHRONIC PAIN OF BOTH KNEES: ICD-10-CM

## 2022-03-28 RX ORDER — TRAMADOL HYDROCHLORIDE 50 MG/1
TABLET ORAL
Qty: 21 TABLET | Refills: 0 | Status: SHIPPED | OUTPATIENT
Start: 2022-03-28 | End: 2022-04-28

## 2022-04-05 ENCOUNTER — HOSPITAL ENCOUNTER (OUTPATIENT)
Age: 77
Discharge: HOME | End: 2022-04-07
Payer: MEDICARE

## 2022-04-05 ENCOUNTER — HOSPITAL ENCOUNTER (OUTPATIENT)
Age: 77
Setting detail: SPECIMEN
Discharge: HOME | End: 2022-04-05
Payer: MEDICARE

## 2022-04-05 ENCOUNTER — HOSPITAL ENCOUNTER (OUTPATIENT)
Dept: GENERAL RADIOLOGY | Age: 77
Discharge: HOME | End: 2022-04-07
Payer: MEDICARE

## 2022-04-05 ENCOUNTER — TELEPHONE (OUTPATIENT)
Dept: PRIMARY CARE CLINIC | Age: 77
End: 2022-04-05

## 2022-04-05 DIAGNOSIS — I10 ESSENTIAL HYPERTENSION: Chronic | ICD-10-CM

## 2022-04-05 DIAGNOSIS — Z13.0 SCREENING, ANEMIA, DEFICIENCY, IRON: ICD-10-CM

## 2022-04-05 DIAGNOSIS — E11.9 TYPE 2 DIABETES MELLITUS WITHOUT COMPLICATION, UNSPECIFIED WHETHER LONG TERM INSULIN USE (HCC): ICD-10-CM

## 2022-04-05 DIAGNOSIS — M25.552 PAIN, JOINT, HIP, LEFT: ICD-10-CM

## 2022-04-05 DIAGNOSIS — M25.552 PAIN, JOINT, HIP, LEFT: Primary | ICD-10-CM

## 2022-04-05 DIAGNOSIS — E78.2 MIXED HYPERLIPIDEMIA: Chronic | ICD-10-CM

## 2022-04-05 LAB
ABSOLUTE EOS #: 0.29 K/UL (ref 0–0.44)
ABSOLUTE IMMATURE GRANULOCYTE: 0.03 K/UL (ref 0–0.3)
ABSOLUTE LYMPH #: 1.29 K/UL (ref 1.1–3.7)
ABSOLUTE MONO #: 0.74 K/UL (ref 0.1–1.2)
ALBUMIN SERPL-MCNC: 4.7 G/DL (ref 3.5–5.2)
ALBUMIN/GLOBULIN RATIO: 1.8 (ref 1–2.5)
ALP BLD-CCNC: 53 U/L (ref 40–129)
ALT SERPL-CCNC: 23 U/L (ref 5–41)
ANION GAP SERPL CALCULATED.3IONS-SCNC: 12 MMOL/L (ref 9–17)
AST SERPL-CCNC: 27 U/L
BASOPHILS # BLD: 1 % (ref 0–2)
BASOPHILS ABSOLUTE: 0.07 K/UL (ref 0–0.2)
BILIRUB SERPL-MCNC: 1.04 MG/DL (ref 0.3–1.2)
BUN BLDV-MCNC: 30 MG/DL (ref 8–23)
CALCIUM SERPL-MCNC: 9.8 MG/DL (ref 8.6–10.4)
CHLORIDE BLD-SCNC: 100 MMOL/L (ref 98–107)
CO2: 32 MMOL/L (ref 20–31)
CREAT SERPL-MCNC: 1.44 MG/DL (ref 0.7–1.2)
EOSINOPHILS RELATIVE PERCENT: 4 % (ref 1–4)
GFR AFRICAN AMERICAN: 57 ML/MIN
GFR NON-AFRICAN AMERICAN: 47 ML/MIN
GFR SERPL CREATININE-BSD FRML MDRD: ABNORMAL ML/MIN/{1.73_M2}
GLUCOSE BLD-MCNC: 172 MG/DL (ref 70–99)
HCT VFR BLD CALC: 39.5 % (ref 40.7–50.3)
HEMOGLOBIN: 13 G/DL (ref 13–17)
IMMATURE GRANULOCYTES: 0 %
LYMPHOCYTES # BLD: 19 % (ref 24–43)
MCH RBC QN AUTO: 30.7 PG (ref 25.2–33.5)
MCHC RBC AUTO-ENTMCNC: 32.9 G/DL (ref 28.4–34.8)
MCV RBC AUTO: 93.2 FL (ref 82.6–102.9)
MONOCYTES # BLD: 11 % (ref 3–12)
NRBC AUTOMATED: 0 PER 100 WBC
PDW BLD-RTO: 14.2 % (ref 11.8–14.4)
PLATELET # BLD: 179 K/UL (ref 138–453)
PMV BLD AUTO: 11.2 FL (ref 8.1–13.5)
POTASSIUM SERPL-SCNC: 3.6 MMOL/L (ref 3.7–5.3)
RBC # BLD: 4.24 M/UL (ref 4.21–5.77)
SEG NEUTROPHILS: 65 % (ref 36–65)
SEGMENTED NEUTROPHILS ABSOLUTE COUNT: 4.43 K/UL (ref 1.5–8.1)
SODIUM BLD-SCNC: 144 MMOL/L (ref 135–144)
TOTAL PROTEIN: 7.3 G/DL (ref 6.4–8.3)
WBC # BLD: 6.9 K/UL (ref 3.5–11.3)

## 2022-04-05 PROCEDURE — 73502 X-RAY EXAM HIP UNI 2-3 VIEWS: CPT

## 2022-04-28 DIAGNOSIS — M25.561 CHRONIC PAIN OF BOTH KNEES: ICD-10-CM

## 2022-04-28 DIAGNOSIS — M25.562 CHRONIC PAIN OF BOTH KNEES: ICD-10-CM

## 2022-04-28 DIAGNOSIS — G89.29 CHRONIC PAIN OF BOTH KNEES: ICD-10-CM

## 2022-04-28 RX ORDER — TRAMADOL HYDROCHLORIDE 50 MG/1
TABLET ORAL
Qty: 21 TABLET | Refills: 0 | Status: SHIPPED | OUTPATIENT
Start: 2022-04-28 | End: 2022-08-16 | Stop reason: SDUPTHER

## 2022-05-02 ENCOUNTER — OFFICE VISIT (OUTPATIENT)
Dept: PRIMARY CARE CLINIC | Age: 77
End: 2022-05-02
Payer: MEDICARE

## 2022-05-02 VITALS
BODY MASS INDEX: 28.15 KG/M2 | DIASTOLIC BLOOD PRESSURE: 84 MMHG | HEART RATE: 57 BPM | RESPIRATION RATE: 15 BRPM | SYSTOLIC BLOOD PRESSURE: 136 MMHG | OXYGEN SATURATION: 97 % | WEIGHT: 190.6 LBS

## 2022-05-02 DIAGNOSIS — Z95.2 HISTORY OF AORTIC VALVE REPLACEMENT: ICD-10-CM

## 2022-05-02 DIAGNOSIS — N18.31 STAGE 3A CHRONIC KIDNEY DISEASE (HCC): Primary | ICD-10-CM

## 2022-05-02 DIAGNOSIS — I50.42 CHRONIC COMBINED SYSTOLIC AND DIASTOLIC CONGESTIVE HEART FAILURE (HCC): ICD-10-CM

## 2022-05-02 PROCEDURE — 1036F TOBACCO NON-USER: CPT | Performed by: NURSE PRACTITIONER

## 2022-05-02 PROCEDURE — G8417 CALC BMI ABV UP PARAM F/U: HCPCS | Performed by: NURSE PRACTITIONER

## 2022-05-02 PROCEDURE — G8427 DOCREV CUR MEDS BY ELIG CLIN: HCPCS | Performed by: NURSE PRACTITIONER

## 2022-05-02 PROCEDURE — 4040F PNEUMOC VAC/ADMIN/RCVD: CPT | Performed by: NURSE PRACTITIONER

## 2022-05-02 PROCEDURE — 99214 OFFICE O/P EST MOD 30 MIN: CPT | Performed by: NURSE PRACTITIONER

## 2022-05-02 PROCEDURE — 1123F ACP DISCUSS/DSCN MKR DOCD: CPT | Performed by: NURSE PRACTITIONER

## 2022-05-02 RX ORDER — WARFARIN SODIUM 5 MG/1
TABLET ORAL
COMMUNITY
Start: 2022-03-29

## 2022-05-02 ASSESSMENT — ENCOUNTER SYMPTOMS
SINUS PRESSURE: 0
ABDOMINAL PAIN: 0
NAUSEA: 0
CONSTIPATION: 0
BLOOD IN STOOL: 0
VOMITING: 0
DIARRHEA: 0
TROUBLE SWALLOWING: 0
COUGH: 0
SORE THROAT: 0
WHEEZING: 0
SHORTNESS OF BREATH: 0

## 2022-05-02 ASSESSMENT — PATIENT HEALTH QUESTIONNAIRE - PHQ9
2. FEELING DOWN, DEPRESSED OR HOPELESS: 0
1. LITTLE INTEREST OR PLEASURE IN DOING THINGS: 0
SUM OF ALL RESPONSES TO PHQ QUESTIONS 1-9: 0
SUM OF ALL RESPONSES TO PHQ9 QUESTIONS 1 & 2: 0
SUM OF ALL RESPONSES TO PHQ QUESTIONS 1-9: 0

## 2022-05-02 NOTE — PROGRESS NOTES
[address] PRIMARY CARE  [address] [facility] [address]  [address] [address]  Dept: [phone]  Dept Fax: [phone]    Alex Cobb is a 76 y.o. male who presentstoday for his medical conditions/complaints as noted below. Alex Cobb is c/o of  Chief Complaint   Patient presents with    Discuss Labs       HPI:     Here today to discuss recent labs which indicate CKD  States was increasing use of lasix, \"cardiologist said I could use whenever I feel short of breath\"  He states he does weight himself daily and has been stable  Denies swelling  The SOB is mainly with exertion/activity  He and wife report is past due for follow up with cardiologist but they are planning to schedule soon  He also states does not drink much as thought this might help prevent fluid build up in his lungs        Hemoglobin A1C (%)   Date Value   2022 7.5   12/15/2020 7.5   2020 6.2             ( goal A1C is < 7)   Microalb/Crt.  Ratio (mcg/mg creat)   Date Value   2017 73 (H)     LDL Cholesterol (mg/dL)   Date Value   2017 96   2016 196 (H)     LDL Calculated (mg/dL)   Date Value   2018 91       (goal LDL is <100)   AST (U/L)   Date Value   2022 27     ALT (U/L)   Date Value   2022 23     BUN (mg/dL)   Date Value   2022 30 (H)     BP Readings from Last 3 Encounters:   22 136/84   22 130/86   10/11/21 124/84          ([id number])    Past Medical History:   Diagnosis Date    Diabetes ([address]     Hyperlipidemia     Hypertension     Kidney stones       Past Surgical History:   Procedure Laterality Date    CARDIAC VALVE REPLACEMENT  [id number]    freestyle aortic root heart valve    COLON SURGERY      perforate during colonoscopy       Family History   Problem Relation Age of Onset    Diabetes Father           Social History     Tobacco Use    Smoking status: Former Smoker     Packs/day: 1.00     Years: 20.00     Pack years: 20.00  Smokeless tobacco: Former User     Quit date: 5/12/1977   Substance Use Topics    Alcohol use: No     Alcohol/week: 0.0 standard drinks     Comment: very rarely      Current Outpatient Medications   Medication Sig Dispense Refill    JANTOVEN 5 MG tablet TAKE 1 TO 1.5 TABLETS BY MOUTH ONCE DAILY AS DIRECTED      traMADol (ULTRAM) 50 MG tablet TAKE 1 TABLET BY MOUTH EVERY EIGHT HOURS AS NEEDED FOR PAIN 21 tablet 0    diclofenac sodium (VOLTAREN) 1 % GEL Apply 4 g topically 4 times daily 150 g 5    SITagliptin (JANUVIA) 100 MG tablet Take 1 tablet by mouth daily 90 tablet 1    carvedilol (COREG) 25 MG tablet TAKE 1 TABLET TWICE DAILY  WITH MEALS 180 tablet 0    glipiZIDE (GLUCOTROL) 10 MG tablet TAKE 1 TABLET TWICE A DAY  BEFORE MEALS 180 tablet 1    rivaroxaban (XARELTO) 20 MG TABS tablet Take 1 tablet by mouth daily (with breakfast) 90 tablet 3    furosemide (LASIX) 20 MG tablet Take 1 tablet by mouth 2 times daily (Patient taking differently: Take 40 mg by mouth daily ) 180 tablet 3    sacubitril-valsartan (ENTRESTO) 24-26 MG per tablet TAKE 1 TABLET BY MOUTH TWICE DAILY FOR 21 DAYS 60 tablet 3     No current facility-administered medications for this visit. No Known Allergies    Health Maintenance   Topic Date Due    Hepatitis C screen  Never done    DTaP/Tdap/Td vaccine (1 - Tdap) Never done    Depression Screen  02/17/2023    Annual Wellness Visit (AWV)  02/18/2023    Potassium  04/05/2023    Creatinine  04/05/2023    Flu vaccine  Completed    Shingles vaccine  Completed    Pneumococcal 65+ years Vaccine  Completed    COVID-19 Vaccine  Completed    Hepatitis A vaccine  Aged Out    Hib vaccine  Aged Out    Meningococcal (ACWY) vaccine  Aged Out       Subjective:      Review of Systems   Constitutional: Negative for activity change, appetite change, chills, fatigue, fever and unexpected weight change.    HENT: Negative for congestion, ear pain, hearing loss, sinus pressure, sore throat and trouble swallowing. Eyes: Negative for visual disturbance. Respiratory: Negative for cough, shortness of breath and wheezing. Cardiovascular: Negative for chest pain, palpitations and leg swelling. Gastrointestinal: Negative for abdominal pain, blood in stool, constipation, diarrhea, nausea and vomiting. Endocrine: Negative for cold intolerance, heat intolerance, polydipsia, polyphagia and polyuria. Genitourinary: Negative for difficulty urinating, frequency, hematuria and urgency. Musculoskeletal: Negative for arthralgias and myalgias. Skin: Negative for rash. Allergic/Immunologic: Negative for environmental allergies. Neurological: Negative for dizziness, weakness, light-headedness and headaches. Psychiatric/Behavioral: Negative for confusion. The patient is not nervous/anxious. Objective:     Physical Exam  Constitutional:       Appearance: He is well-developed. HENT:      Head: Normocephalic. Eyes:      Conjunctiva/sclera: Conjunctivae normal.      Pupils: Pupils are equal, round, and reactive to light. Cardiovascular:      Rate and Rhythm: Normal rate and regular rhythm. Heart sounds: Normal heart sounds. No murmur heard. Pulmonary:      Effort: Pulmonary effort is normal.      Breath sounds: Normal breath sounds. No wheezing. Abdominal:      General: Bowel sounds are normal. There is no distension. Palpations: Abdomen is soft. Musculoskeletal:         General: Normal range of motion. Cervical back: Normal range of motion. Skin:     General: Skin is warm and dry. Neurological:      Mental Status: He is alert and oriented to person, place, and time. Psychiatric:         Behavior: Behavior normal.         Thought Content: Thought content normal.         Judgment: Judgment normal.       /84   Pulse 57   Resp 15   Wt 190 lb 9.6 oz (86.5 kg)   SpO2 97%   BMI 28.15 kg/m²     Assessment:       Diagnosis Orders   1.  Stage 3a chronic kidney disease [facility])  Basic Metabolic Panel   2. Chronic combined systolic and diastolic congestive heart failure ([facility] [address]     3. History of aortic valve replacement               Plan:      No follow-ups on file. Orders Placed This Encounter   Procedures    Basic Metabolic Panel     Standing Status:   Future     Standing Expiration Date:   5/2/2023     CKD, CHF, history aortic valve replacement- lengthy discussion with patient and wife on need to not titrate Lasix independently based off of just shortness of breath with exertion. Strongly advised if he feels he is developing symptoms that require increased diuretic to call this office or his cardiologist office prior to increasing dose. Advised to take Lasix 20 mg 1 time per day only, weigh self daily and report weight gain of 3 to 5 pounds over 2 to 3 days. Increase fluids to approximately 64 mL total per day, repeat labs in 1 month  Strongly advised to schedule follow-up with cardiology ASAP   Patient given educational materials - see patient instructions. Discussed use, benefit, and side effects of prescribed medications. All patientquestions answered. Pt voiced understanding. Reviewed health maintenance. Instructedto continue current medications, diet and exercise. Patient agreed with treatmentplan. Follow up as directed.      Electronicallysigned by HUGO Donovan CNP on 5/2/2022 at 2:49 PM

## 2022-05-09 RX ORDER — CARVEDILOL 25 MG/1
TABLET ORAL
Qty: 180 TABLET | Refills: 3 | Status: SHIPPED | OUTPATIENT
Start: 2022-05-09

## 2022-05-19 ENCOUNTER — OFFICE VISIT (OUTPATIENT)
Dept: PRIMARY CARE CLINIC | Age: 77
End: 2022-05-19
Payer: MEDICARE

## 2022-05-19 ENCOUNTER — HOSPITAL ENCOUNTER (OUTPATIENT)
Age: 77
Setting detail: SPECIMEN
Discharge: HOME | End: 2022-05-19

## 2022-05-19 VITALS
DIASTOLIC BLOOD PRESSURE: 78 MMHG | BODY MASS INDEX: 26.85 KG/M2 | RESPIRATION RATE: 15 BRPM | SYSTOLIC BLOOD PRESSURE: 122 MMHG | WEIGHT: 181.8 LBS | OXYGEN SATURATION: 98 % | HEART RATE: 59 BPM

## 2022-05-19 DIAGNOSIS — I10 ESSENTIAL HYPERTENSION: Chronic | ICD-10-CM

## 2022-05-19 DIAGNOSIS — I50.42 CHRONIC COMBINED SYSTOLIC AND DIASTOLIC CONGESTIVE HEART FAILURE (HCC): ICD-10-CM

## 2022-05-19 DIAGNOSIS — I48.21 PERMANENT ATRIAL FIBRILLATION (HCC): ICD-10-CM

## 2022-05-19 DIAGNOSIS — N18.31 STAGE 3A CHRONIC KIDNEY DISEASE (HCC): ICD-10-CM

## 2022-05-19 DIAGNOSIS — E11.9 TYPE 2 DIABETES MELLITUS WITHOUT COMPLICATION, WITHOUT LONG-TERM CURRENT USE OF INSULIN (HCC): Primary | ICD-10-CM

## 2022-05-19 DIAGNOSIS — E78.2 MIXED HYPERLIPIDEMIA: Chronic | ICD-10-CM

## 2022-05-19 LAB
ANION GAP SERPL CALCULATED.3IONS-SCNC: 15 MMOL/L (ref 9–17)
BUN BLDV-MCNC: 25 MG/DL (ref 8–23)
CALCIUM SERPL-MCNC: 10.1 MG/DL (ref 8.6–10.4)
CHLORIDE BLD-SCNC: 102 MMOL/L (ref 98–107)
CO2: 26 MMOL/L (ref 20–31)
CREAT SERPL-MCNC: 1.16 MG/DL (ref 0.7–1.2)
GFR AFRICAN AMERICAN: >60 ML/MIN
GFR NON-AFRICAN AMERICAN: >60 ML/MIN
GFR SERPL CREATININE-BSD FRML MDRD: ABNORMAL ML/MIN/{1.73_M2}
GLUCOSE BLD-MCNC: 142 MG/DL (ref 70–99)
HBA1C MFR BLD: 6.7 %
POTASSIUM SERPL-SCNC: 4.3 MMOL/L (ref 3.7–5.3)
SODIUM BLD-SCNC: 143 MMOL/L (ref 135–144)

## 2022-05-19 PROCEDURE — 1123F ACP DISCUSS/DSCN MKR DOCD: CPT | Performed by: NURSE PRACTITIONER

## 2022-05-19 PROCEDURE — 4040F PNEUMOC VAC/ADMIN/RCVD: CPT | Performed by: NURSE PRACTITIONER

## 2022-05-19 PROCEDURE — 99214 OFFICE O/P EST MOD 30 MIN: CPT | Performed by: NURSE PRACTITIONER

## 2022-05-19 PROCEDURE — G8417 CALC BMI ABV UP PARAM F/U: HCPCS | Performed by: NURSE PRACTITIONER

## 2022-05-19 PROCEDURE — 3044F HG A1C LEVEL LT 7.0%: CPT | Performed by: NURSE PRACTITIONER

## 2022-05-19 PROCEDURE — 1036F TOBACCO NON-USER: CPT | Performed by: NURSE PRACTITIONER

## 2022-05-19 PROCEDURE — 83036 HEMOGLOBIN GLYCOSYLATED A1C: CPT | Performed by: NURSE PRACTITIONER

## 2022-05-19 PROCEDURE — G8427 DOCREV CUR MEDS BY ELIG CLIN: HCPCS | Performed by: NURSE PRACTITIONER

## 2022-05-19 ASSESSMENT — ENCOUNTER SYMPTOMS
DIARRHEA: 0
SINUS PRESSURE: 0
SORE THROAT: 0
CONSTIPATION: 0
TROUBLE SWALLOWING: 0
WHEEZING: 0
SHORTNESS OF BREATH: 0
BLOOD IN STOOL: 0
VOMITING: 0
ABDOMINAL PAIN: 0
NAUSEA: 0
COUGH: 0

## 2022-05-19 ASSESSMENT — PATIENT HEALTH QUESTIONNAIRE - PHQ9
SUM OF ALL RESPONSES TO PHQ QUESTIONS 1-9: 0
SUM OF ALL RESPONSES TO PHQ9 QUESTIONS 1 & 2: 0
1. LITTLE INTEREST OR PLEASURE IN DOING THINGS: 0
2. FEELING DOWN, DEPRESSED OR HOPELESS: 0

## 2022-05-19 NOTE — PROGRESS NOTES
[address] PRIMARY CARE  [address] [facility] [address]  [address] [address]  Dept: [phone]  Dept Fax: [phone]    Vin Costello is a 76 y.o. male who presentstoday for his medical conditions/complaints as noted below. 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 He has type 2 diabetes mellitus. His disease course has been improving. There are no hypoglycemic associated symptoms. Pertinent negatives for hypoglycemia include no confusion, dizziness, headaches or nervousness/anxiousness. Pertinent negatives for diabetes include no chest pain, no fatigue, no polydipsia, no polyphagia, no polyuria and no weakness. Current diabetic treatment includes diet and oral agent (dual therapy). He is compliant with treatment most of the time. His weight is decreasing steadily. He is following a generally healthy diet. He participates in exercise intermittently. His home blood glucose trend is decreasing steadily. An ACE inhibitor/angiotensin II receptor blocker is being taken. Hemoglobin A1C (%)   Date Value   2022 6.7   2022 7.5   12/15/2020 7.5             ( goal A1C is < 7)   Microalb/Crt.  Ratio (mcg/mg creat)   Date Value   2017 73 (H)     LDL Cholesterol (mg/dL)   Date Value 07/27/2017 96   05/19/2016 196 (H)     LDL Calculated (mg/dL)   Date Value   07/03/2018 91       (goal LDL is <100)   AST (U/L)   Date Value   04/05/2022 27     ALT (U/L)   Date Value   04/05/2022 23     BUN (mg/dL)   Date Value   04/05/2022 30 (H)     BP Readings from Last 3 Encounters:   05/19/22 122/78   05/02/22 136/84   02/17/22 130/86          ([id number])    Past Medical History:   Diagnosis Date    Diabetes [address] [address]     Hyperlipidemia     Hypertension     Kidney stones       Past Surgical History:   Procedure Laterality Date    CARDIAC VALVE REPLACEMENT  [id number]    freestyle aortic root heart valve    COLON SURGERY      perforate during colonoscopy       Family History   Problem Relation Age of Onset    Diabetes Father           Social History     Tobacco Use    Smoking status: Former Smoker     Packs/day: 1.00     Years: 20.00     Pack years: 20.00    Smokeless tobacco: Former User     Quit date: 5/12/1977   Substance Use Topics    Alcohol use: No     Alcohol/week: 0.0 standard drinks     Comment: very rarely      Current Outpatient Medications   Medication Sig Dispense Refill    Dapagliflozin Propanediol (FARXIGA PO) Take by mouth Patient unsure of dosing      carvedilol (COREG) 25 MG tablet TAKE 1 TABLET BY MOUTH TWO TIMES A DAY WITH MEALS 180 tablet 3    JANTOVEN 5 MG tablet TAKE 1 TO 1.5 TABLETS BY MOUTH ONCE DAILY AS DIRECTED      diclofenac sodium (VOLTAREN) 1 % GEL Apply 4 g topically 4 times daily 150 g 5    SITagliptin (JANUVIA) 100 MG tablet Take 1 tablet by mouth daily 90 tablet 1    glipiZIDE (GLUCOTROL) 10 MG tablet TAKE 1 TABLET TWICE A DAY  BEFORE MEALS 180 tablet 1    furosemide (LASIX) 20 MG tablet Take 1 tablet by mouth 2 times daily (Patient taking differently: Take 40 mg by mouth daily ) 180 tablet 3    sacubitril-valsartan (ENTRESTO) 24-26 MG per tablet TAKE 1 TABLET BY MOUTH TWICE DAILY FOR 21 DAYS 60 tablet 3     No current facility-administered medications for this visit. No Known Allergies    Health Maintenance   Topic Date Due    Hepatitis C screen  Never done    DTaP/Tdap/Td vaccine (1 - Tdap) 05/19/2023 (Originally 1/30/1964)    Annual Wellness Visit (AWV)  02/18/2023    Depression Screen  05/02/2023    Flu vaccine  Completed    Shingles vaccine  Completed    Pneumococcal 65+ years Vaccine  Completed    COVID-19 Vaccine  Completed    Hepatitis A vaccine  Aged Out    Hib vaccine  Aged Out    Meningococcal (ACWY) vaccine  Aged Out       Subjective:      Review of Systems   Constitutional: Negative for activity change, appetite change, chills, fatigue, fever and unexpected weight change. HENT: Negative for congestion, ear pain, hearing loss, sinus pressure, sore throat and trouble swallowing. Eyes: Negative for visual disturbance. Respiratory: Negative for cough, shortness of breath and wheezing. Cardiovascular: Negative for chest pain, palpitations and leg swelling. Gastrointestinal: Negative for abdominal pain, blood in stool, constipation, diarrhea, nausea and vomiting. Endocrine: Negative for cold intolerance, heat intolerance, polydipsia, polyphagia and polyuria. Genitourinary: Negative for difficulty urinating, frequency, hematuria and urgency. Musculoskeletal: Positive for arthralgias. Negative for myalgias. Skin: Negative for rash. Allergic/Immunologic: Negative for environmental allergies. Neurological: Negative for dizziness, weakness, light-headedness and headaches. Psychiatric/Behavioral: Negative for confusion. The patient is not nervous/anxious. Objective:     Physical Exam  Constitutional:       Appearance: He is well-developed. HENT:      Head: Normocephalic. Eyes:      Conjunctiva/sclera: Conjunctivae normal.      Pupils: Pupils are equal, round, and reactive to light. Cardiovascular:      Rate and Rhythm: Normal rate and regular rhythm. Heart sounds: Normal heart sounds.  No murmur heard.      Pulmonary:      Effort: Pulmonary effort is normal.      Breath sounds: Normal breath sounds. No wheezing. Abdominal:      General: Bowel sounds are normal. There is no distension. Palpations: Abdomen is soft. Musculoskeletal:         General: Normal range of motion. Cervical back: Normal range of motion. Skin:     General: Skin is warm and dry. Neurological:      Mental Status: He is alert and oriented to person, place, and time. Psychiatric:         Behavior: Behavior normal.         Thought Content: Thought content normal.         Judgment: Judgment normal.       /78   Pulse 59   Resp 15   Wt 181 lb 12.8 oz (82.5 kg)   SpO2 98%   BMI 26.85 kg/m²     Assessment:       Diagnosis Orders   1. Type 2 diabetes mellitus without complication, without long-term current use of insulin (HCC)  POCT glycosylated hemoglobin (Hb A1C)   2. Mixed hyperlipidemia     3. Permanent atrial fibrillation ([address]     4. Chronic combined systolic and diastolic congestive heart failure ([address]     5. Essential hypertension               Plan:      Return in about 3 months (around 8/19/2022) for diabetes check. Diabetes- A1c remains well controlled, reviewed diet, continue current medications  Hyperlipidemia, hypertension-stable and well-controlled on current agents  Atrial failed, CHF-reviewed recent visit with cardiology, condition appears stable at this time. He continues anticoagulation. Has decreased Lasix to daily, will check BMP to see if kidney function has returned to normal with less diuretic use  Orders Placed This Encounter   Procedures    POCT glycosylated hemoglobin (Hb A1C)          Patient given educational materials - see patient instructions. Discussed use, benefit, and side effects of prescribed medications. All patientquestions answered. Pt voiced understanding. Reviewed health maintenance. Instructedto continue current medications, diet and exercise.   Patient agreed with treatmentplan. Follow up as directed.      Electronicallysigned by HUGO Martell CNP on 5/19/2022 at 3:47 PM

## 2022-07-26 RX ORDER — GLIPIZIDE 10 MG/1
TABLET ORAL
Qty: 180 TABLET | Refills: 3 | Status: SHIPPED | OUTPATIENT
Start: 2022-07-26

## 2022-08-16 DIAGNOSIS — G89.29 CHRONIC PAIN OF BOTH KNEES: ICD-10-CM

## 2022-08-16 DIAGNOSIS — M25.562 CHRONIC PAIN OF BOTH KNEES: ICD-10-CM

## 2022-08-16 DIAGNOSIS — M25.561 CHRONIC PAIN OF BOTH KNEES: ICD-10-CM

## 2022-08-16 RX ORDER — TRAMADOL HYDROCHLORIDE 50 MG/1
50 TABLET ORAL EVERY 8 HOURS PRN
Qty: 21 TABLET | Refills: 0 | Status: SHIPPED | OUTPATIENT
Start: 2022-08-16 | End: 2022-08-23

## 2022-08-19 ENCOUNTER — TELEPHONE (OUTPATIENT)
Dept: PRIMARY CARE CLINIC | Age: 77
End: 2022-08-19

## 2022-08-19 NOTE — LETTER
[facility] Primary Care  [address]  Phone: [phone]  Fax: [phone] [facility] HUGO Holland CNP        August 19, 2022     [facility]  [address] [facility] [address]      Dear Leonard Jones:    We missed seeing you for a scheduled appointment at [address] with HUGO Man CNP on 8/19/2022. We're sorry you were unable to keep your appointment and hope that you are doing well. We ask that you please call 24 hours in advance if you are unable to make your appointment, so that we can give that time to another patient in need. We care about you and the management of your healthcare and want to make sure that you follow up as recommended. To provide quality care and timely appointments to all our patients, you may be dismissed from the practice if you do not show for three (3) scheduled appointments within a 12-month period. We would like to continue treating your healthcare needs. Please call the office to reschedule your appointment, if needed.      Sincerely,        HUGO Man CNP

## 2022-08-23 DIAGNOSIS — E11.9 TYPE 2 DIABETES MELLITUS WITHOUT COMPLICATION, UNSPECIFIED WHETHER LONG TERM INSULIN USE (HCC): ICD-10-CM

## 2022-08-23 RX ORDER — SITAGLIPTIN 100 MG/1
TABLET, FILM COATED ORAL
Qty: 90 TABLET | Refills: 3 | Status: SHIPPED | OUTPATIENT
Start: 2022-08-23 | End: 2022-08-30

## 2022-08-30 ENCOUNTER — TELEPHONE (OUTPATIENT)
Dept: PRIMARY CARE CLINIC | Age: 77
End: 2022-08-30

## 2022-08-30 NOTE — TELEPHONE ENCOUNTER
Advise he schedule appointment for A1c as he was due 8/19.   We can discuss medication treatment options at that time

## 2022-08-30 NOTE — TELEPHONE ENCOUNTER
Alan Leon called and his Dewitte Hurley is going to cost him over 400.00 a month and can not afford this. He would like to know if something else can be sent for him. He is not crazy about going back on Metformin.

## 2022-09-09 NOTE — TELEPHONE ENCOUNTER
Patient's wife called to check on diabetic medication. Advised office has been trying to reach him but she states he works a lot and does not answer his phone so she suggested her number be changed to his contact number-writer changed. She made appt for  for next week for A1c and to discuss meds.

## 2022-09-15 ENCOUNTER — OFFICE VISIT (OUTPATIENT)
Dept: PRIMARY CARE CLINIC | Age: 77
End: 2022-09-15
Payer: MEDICARE

## 2022-09-15 VITALS
HEART RATE: 54 BPM | HEIGHT: 69 IN | OXYGEN SATURATION: 95 % | SYSTOLIC BLOOD PRESSURE: 130 MMHG | BODY MASS INDEX: 27.4 KG/M2 | DIASTOLIC BLOOD PRESSURE: 68 MMHG | WEIGHT: 185 LBS

## 2022-09-15 DIAGNOSIS — I50.42 CHRONIC COMBINED SYSTOLIC AND DIASTOLIC CONGESTIVE HEART FAILURE (HCC): ICD-10-CM

## 2022-09-15 DIAGNOSIS — I10 ESSENTIAL HYPERTENSION: ICD-10-CM

## 2022-09-15 DIAGNOSIS — I48.21 PERMANENT ATRIAL FIBRILLATION (HCC): ICD-10-CM

## 2022-09-15 DIAGNOSIS — B35.1 ONYCHOMYCOSIS: ICD-10-CM

## 2022-09-15 DIAGNOSIS — E11.9 TYPE 2 DIABETES MELLITUS WITHOUT COMPLICATION, UNSPECIFIED WHETHER LONG TERM INSULIN USE (HCC): Primary | ICD-10-CM

## 2022-09-15 LAB — HBA1C MFR BLD: 7.4 %

## 2022-09-15 PROCEDURE — 83036 HEMOGLOBIN GLYCOSYLATED A1C: CPT | Performed by: NURSE PRACTITIONER

## 2022-09-15 PROCEDURE — 3051F HG A1C>EQUAL 7.0%<8.0%: CPT | Performed by: NURSE PRACTITIONER

## 2022-09-15 PROCEDURE — G8417 CALC BMI ABV UP PARAM F/U: HCPCS | Performed by: NURSE PRACTITIONER

## 2022-09-15 PROCEDURE — G8427 DOCREV CUR MEDS BY ELIG CLIN: HCPCS | Performed by: NURSE PRACTITIONER

## 2022-09-15 PROCEDURE — 99214 OFFICE O/P EST MOD 30 MIN: CPT | Performed by: NURSE PRACTITIONER

## 2022-09-15 PROCEDURE — 1123F ACP DISCUSS/DSCN MKR DOCD: CPT | Performed by: NURSE PRACTITIONER

## 2022-09-15 PROCEDURE — 1036F TOBACCO NON-USER: CPT | Performed by: NURSE PRACTITIONER

## 2022-09-15 RX ORDER — TERBINAFINE HYDROCHLORIDE 250 MG/1
250 TABLET ORAL DAILY
Qty: 42 TABLET | Refills: 0 | Status: SHIPPED | OUTPATIENT
Start: 2022-09-15 | End: 2022-10-27

## 2022-09-15 ASSESSMENT — ENCOUNTER SYMPTOMS
TROUBLE SWALLOWING: 0
WHEEZING: 0
BLOOD IN STOOL: 0
DIARRHEA: 0
CONSTIPATION: 0
COUGH: 0
SINUS PRESSURE: 0
ABDOMINAL PAIN: 0
ROS SKIN COMMENTS: FUNGAL NAILS
SORE THROAT: 0
VOMITING: 0
SHORTNESS OF BREATH: 0
NAUSEA: 0

## 2022-09-15 NOTE — PROGRESS NOTES
[facility] PR[facility]  [facility] [address]  [address] [address]  Dept: [phone]  Dept Fax: [phone]    Bebo Magallon is a 76 y.o. male who presentstoday for his medical conditions/complaints as noted below. Bebo Magallon is c/o of  Chief Complaint   Patient presents with    Diabetes           HPI:     Here today for follow up  Seen concerned about changes on his bilateral fingernails  He states started using over-the-counter topical treatment but has not noticed any change, asking if this is a fungal infection    Reports he is otherwise well  Has started working again but states that his job is easier on him and he is enjoying it. Was unable to obtain new prescription medications for his diabetes due to cost  He has only been taking his glimepiride  He states when he gets in the donut hole he is unable to afford more expensive medications, asking about less costly options    Diabetes  He presents for his follow-up diabetic visit. He has type 2 diabetes mellitus. His disease course has been fluctuating. There are no hypoglycemic associated symptoms. Pertinent negatives for hypoglycemia include no confusion, dizziness, headaches or nervousness/anxiousness. Pertinent negatives for diabetes include no chest pain, no fatigue, no polydipsia, no polyphagia, no polyuria and no weakness. Current diabetic treatment includes diet and oral agent (monotherapy). He is compliant with treatment some of the time. His weight is stable. He is following a generally healthy diet. He participates in exercise three times a week. His home blood glucose trend is increasing steadily. An ACE inhibitor/angiotensin II receptor blocker is being taken. Hemoglobin A1C (%)   Date Value   09/15/2022 7.4   05/19/2022 6.7   02/17/2022 7.5             ( goal A1C is < 7)   Microalb/Crt.  Ratio (mcg/mg creat)   Date Value   09/29/2017 73 (H)     LDL Cholesterol (mg/dL)   Date Value 07/27/2017 96   05/19/2016 196 (H)     LDL Calculated (mg/dL)   Date Value   07/03/2018 91       (goal LDL is <100)   AST (U/L)   Date Value   04/05/2022 27     ALT (U/L)   Date Value   04/05/2022 23     BUN (mg/dL)   Date Value   05/19/2022 25 (H)     BP Readings from Last 3 Encounters:   09/15/22 130/68   05/19/22 122/78   05/02/22 136/84          (fixy615/80)    Past Medical History:   Diagnosis Date    Diabetes ([facility] [address]     Hyperlipidemia     Hypertension     Kidney stones       Past Surgical History:   Procedure Laterality Date    CARDIAC VALVE REPLACEMENT  [id number]    freestyle aortic root heart valve    COLON SURGERY      perforate during colonoscopy       Family History   Problem Relation Age of Onset    Diabetes Father           Social History     Tobacco Use    Smoking status: Former     Packs/day: 1.00     Years: 20.00     Pack years: 20.00     Types: Cigarettes    Smokeless tobacco: Former     Quit date: 5/12/1977   Substance Use Topics    Alcohol use: No     Alcohol/week: 0.0 standard drinks     Comment: very rarely      Current Outpatient Medications   Medication Sig Dispense Refill    metFORMIN (GLUCOPHAGE) 500 MG tablet Take 1 tablet by mouth 2 times daily (with meals) 180 tablet 1    terbinafine (LAMISIL) 250 MG tablet Take 1 tablet by mouth daily 42 tablet 0    glipiZIDE (GLUCOTROL) 10 MG tablet TAKE 1 TABLET BY MOUTH TWO TIMES A DAY BEFORE MEALS 180 tablet 3    carvedilol (COREG) 25 MG tablet TAKE 1 TABLET BY MOUTH TWO TIMES A DAY WITH MEALS 180 tablet 3    JANTOVEN 5 MG tablet TAKE 1 TO 1.5 TABLETS BY MOUTH ONCE DAILY AS DIRECTED      diclofenac sodium (VOLTAREN) 1 % GEL Apply 4 g topically 4 times daily 150 g 5    furosemide (LASIX) 20 MG tablet Take 1 tablet by mouth 2 times daily (Patient taking differently: Take 40 mg by mouth daily) 180 tablet 3    sacubitril-valsartan (ENTRESTO) 24-26 MG per tablet TAKE 1 TABLET BY MOUTH TWICE DAILY FOR 21 DAYS 60 tablet 3     No current facility-administered medications for this visit. No Known Allergies    Health Maintenance   Topic Date Due    Hepatitis C screen  Never done    COVID-19 Vaccine (4 - Booster for Moderna series) 03/15/2022    Flu vaccine (1) 09/01/2022    DTaP/Tdap/Td vaccine (1 - Tdap) 05/19/2023 (Originally 1/30/1964)    Annual Wellness Visit (AWV)  02/18/2023    Depression Screen  05/19/2023    Shingles vaccine  Completed    Pneumococcal 65+ years Vaccine  Completed    Hepatitis A vaccine  Aged Out    Hib vaccine  Aged Out    Meningococcal (ACWY) vaccine  Aged Out       Subjective:      Review of Systems   Constitutional:  Negative for activity change, appetite change, chills, fatigue, fever and unexpected weight change. HENT:  Negative for congestion, ear pain, hearing loss, sinus pressure, sore throat and trouble swallowing. Eyes:  Negative for visual disturbance. Respiratory:  Negative for cough, shortness of breath and wheezing. Cardiovascular:  Negative for chest pain, palpitations and leg swelling. Gastrointestinal:  Negative for abdominal pain, blood in stool, constipation, diarrhea, nausea and vomiting. Endocrine: Negative for cold intolerance, heat intolerance, polydipsia, polyphagia and polyuria. Genitourinary:  Negative for difficulty urinating, frequency, hematuria and urgency. Musculoskeletal:  Positive for arthralgias. Negative for myalgias. Skin:  Negative for rash. Fungal nails   Allergic/Immunologic: Negative for environmental allergies. Neurological:  Negative for dizziness, weakness, light-headedness and headaches. Psychiatric/Behavioral:  Negative for confusion. The patient is not nervous/anxious. Objective:     Physical Exam  Constitutional:       Appearance: Normal appearance. He is well-developed. HENT:      Head: Normocephalic. Eyes:      Conjunctiva/sclera: Conjunctivae normal.      Pupils: Pupils are equal, round, and reactive to light.    Cardiovascular: Rate and Rhythm: Normal rate and regular rhythm. Heart sounds: Normal heart sounds. No murmur heard. Pulmonary:      Effort: Pulmonary effort is normal.      Breath sounds: Normal breath sounds. No wheezing. Abdominal:      General: Bowel sounds are normal. There is no distension. Palpations: Abdomen is soft. Musculoskeletal:         General: Normal range of motion. Cervical back: Normal range of motion. Skin:     General: Skin is warm and dry. Comments: Fingernails on bilateral hands with fungal infection, some nails are chipping/peeling   Neurological:      Mental Status: He is alert and oriented to person, place, and time. Psychiatric:         Behavior: Behavior normal.         Thought Content: Thought content normal.         Judgment: Judgment normal.     /68 (Site: Right Upper Arm, Position: Sitting, Cuff Size: Medium Adult)   Pulse 54   Ht 5' 9\" (1.753 m)   Wt 185 lb (83.9 kg)   SpO2 95%   BMI 27.32 kg/m²     Assessment:       Diagnosis Orders   1. Type 2 diabetes mellitus without complication, unspecified whether long term insulin use (HCC)  POCT glycosylated hemoglobin (Hb A1C)    metFORMIN (GLUCOPHAGE) 500 MG tablet      2. Essential hypertension        3. Permanent atrial fibrillation ([address]        4. Chronic combined systolic and diastolic congestive heart failure ([address]        5. Onychomycosis  terbinafine (LAMISIL) 250 MG tablet                Plan:      Return in about 3 months (around 12/15/2022) for diabetes check. Diabetes-A1c has increased but was unable to obtain his new prescriptions due to cost.  Will return to metformin along with glimepiride as this will remain affordable for him.   Reviewed diet  Hypertension-well-controlled  Atrial fib, CHF-stable on Entresto, at recent visit with cardiology  Onychomycosis-continue over-the-counter topical agent along with oral terbinafine as he has infection present all fingernails  Orders Placed This Encounter Procedures    POCT glycosylated hemoglobin (Hb A1C)        Orders Placed This Encounter   Medications    metFORMIN (GLUCOPHAGE) 500 MG tablet     Sig: Take 1 tablet by mouth 2 times daily (with meals)     Dispense:  180 tablet     Refill:  1    terbinafine (LAMISIL) 250 MG tablet     Sig: Take 1 tablet by mouth daily     Dispense:  42 tablet     Refill:  0       Patient given educational materials - see patient instructions. Discussed use, benefit, and side effects of prescribed medications. All patientquestions answered. Pt voiced understanding. Reviewed health maintenance. Instructedto continue current medications, diet and exercise. Patient agreed with treatmentplan. Follow up as directed.      Electronicallysigned by Debera Hammans, APRN - CNP on 9/15/2022 at 4:41 PM

## 2022-11-23 RX ORDER — FUROSEMIDE 40 MG/1
40 TABLET ORAL DAILY
Qty: 30 TABLET | Refills: 5 | Status: SHIPPED | OUTPATIENT
Start: 2022-11-23

## 2022-12-15 ENCOUNTER — TELEPHONE (OUTPATIENT)
Dept: PRIMARY CARE CLINIC | Age: 77
End: 2022-12-15

## 2022-12-15 ENCOUNTER — TELEPHONE (OUTPATIENT)
Dept: PHARMACY | Age: 77
End: 2022-12-15

## 2022-12-15 DIAGNOSIS — Z79.01 ANTICOAGULATED: ICD-10-CM

## 2022-12-15 DIAGNOSIS — I48.21 PERMANENT ATRIAL FIBRILLATION (HCC): Primary | ICD-10-CM

## 2022-12-15 RX ORDER — WARFARIN SODIUM 5 MG/1
5 TABLET ORAL DAILY
Qty: 30 TABLET | Refills: 3 | Status: SHIPPED | OUTPATIENT
Start: 2022-12-15

## 2022-12-15 NOTE — TELEPHONE ENCOUNTER
I refilled the medication  However-this drug requires close monitoring of PT/INR and it looks like he is past due for that testing.  He will need to go to anticoagulation clinic in order to have this monitored and to ensure his safety  Please have him reach out to [facility] asap, referral has been placed  Also-have him obtain PT/INR asap   Thanks

## 2022-12-15 NOTE — TELEPHONE ENCOUNTER
Patient was dropped by cardiologist and asking for a refill on medication. Patient states cardiology office told them to contact their pcp. Patient is asking for Warfarin 5 mg. Patient takes 1 tablet daily.  Please advise   Last Visit Date: 9/15/2022   Next Visit Date: 12/22/2022

## 2022-12-15 NOTE — TELEPHONE ENCOUNTER
Coumadin clinic called because they are trying to schedule patient and both patient numbers and also patient's wife's number are all bad numbers. Writer verified that we had the same three phone numbers on file. They will keep trying to contact patient.

## 2022-12-16 ENCOUNTER — TELEPHONE (OUTPATIENT)
Dept: PHARMACY | Age: 77
End: 2022-12-16

## 2022-12-16 NOTE — TELEPHONE ENCOUNTER
Patient's wife called to schedule initial INR appointment.     Was going to [address]  Wife unsure of current dosage    INR hasn't been checked for 3-4 months according to wife, unsure last number, but wife states \"everything was good\" the last time they did it    Appointment scheduled for 12/21/22 and phone numbers for both patient and wife updated    Jose Singh, PharmD, [address]  PGY-1 Pharmacy Resident  12/16/2022 2:15 PM

## 2022-12-21 ENCOUNTER — TELEPHONE (OUTPATIENT)
Dept: PHARMACY | Age: 77
End: 2022-12-21

## 2022-12-21 ENCOUNTER — HOSPITAL ENCOUNTER (OUTPATIENT)
Dept: PHARMACY | Age: 77
Setting detail: THERAPIES SERIES
Discharge: HOME | End: 2022-12-21
Payer: MEDICARE

## 2022-12-21 DIAGNOSIS — I48.91 ATRIAL FIBRILLATION, UNSPECIFIED TYPE (HCC): Primary | ICD-10-CM

## 2022-12-21 LAB
INR BLD: 1.1
PROTIME: 12.7 SECONDS

## 2022-12-21 PROCEDURE — 99213 OFFICE O/P EST LOW 20 MIN: CPT

## 2022-12-21 PROCEDURE — 85610 PROTHROMBIN TIME: CPT

## 2022-12-21 RX ORDER — WARFARIN SODIUM 5 MG/1
TABLET ORAL
Qty: 120 TABLET | Refills: 1 | Status: SHIPPED | OUTPATIENT
Start: 2022-12-21

## 2022-12-21 NOTE — PROGRESS NOTES
Patient seen in person in Medication Management Service. Patient states compliant most of the time with regimen. No bleeding or thromboembolic side effects noted. No significant med or dietary changes. No significant recent illness or disease state changes. PT/INR done via POC meter per protocol. INR was subtherapeutic at 1.1.  (goal 2 - 3)  The patient has been out of warfarin x three days  A new prescription for warfarin 5 mg tablets was sent to [facility]    Warfarin regimen will be continued at current dose 7.5 mg Wed/Sat and 5 mg all other days. Will retest in 1 week. Patient understands dosing directions and information discussed. Dosing schedule and follow up appointment given to patient. Progress note routed to referring physicians office. 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 COVID 19 screening completed. At this time patient denies symptoms, recent travel and exposure. Patient educated to screen for temperature and COVID-19 symptoms prior to coming to clinic for next appointment. They are instructed to call the clinic to reschedule if they have any symptoms. Standing order for PT/INR has been placed in preparation to transition to possible remote INR monitoring given efforts to reduce the spread of COVID-19.       For Pharmacy Admin Tracking Only    Intervention Detail: Adherence Monitoring 1  Total # of Interventions Recommended: 0  Total # of Interventions Accepted: 0  Time Spent (min): 20

## 2022-12-22 ENCOUNTER — OFFICE VISIT (OUTPATIENT)
Dept: PRIMARY CARE CLINIC | Age: 77
End: 2022-12-22

## 2022-12-22 VITALS
RESPIRATION RATE: 16 BRPM | OXYGEN SATURATION: 95 % | SYSTOLIC BLOOD PRESSURE: 140 MMHG | WEIGHT: 191.4 LBS | HEART RATE: 55 BPM | DIASTOLIC BLOOD PRESSURE: 72 MMHG | BODY MASS INDEX: 28.26 KG/M2

## 2022-12-22 DIAGNOSIS — R04.0 NOSEBLEED: ICD-10-CM

## 2022-12-22 DIAGNOSIS — E11.9 TYPE 2 DIABETES MELLITUS WITHOUT COMPLICATION, UNSPECIFIED WHETHER LONG TERM INSULIN USE (HCC): Primary | ICD-10-CM

## 2022-12-22 DIAGNOSIS — I48.21 PERMANENT ATRIAL FIBRILLATION (HCC): ICD-10-CM

## 2022-12-22 DIAGNOSIS — I50.42 CHRONIC COMBINED SYSTOLIC AND DIASTOLIC CONGESTIVE HEART FAILURE (HCC): ICD-10-CM

## 2022-12-22 DIAGNOSIS — I10 ESSENTIAL HYPERTENSION: Chronic | ICD-10-CM

## 2022-12-22 LAB — HBA1C MFR BLD: 7.5 %

## 2022-12-22 ASSESSMENT — PATIENT HEALTH QUESTIONNAIRE - PHQ9
SUM OF ALL RESPONSES TO PHQ QUESTIONS 1-9: 0
SUM OF ALL RESPONSES TO PHQ QUESTIONS 1-9: 0
1. LITTLE INTEREST OR PLEASURE IN DOING THINGS: 0
SUM OF ALL RESPONSES TO PHQ QUESTIONS 1-9: 0
SUM OF ALL RESPONSES TO PHQ QUESTIONS 1-9: 0
2. FEELING DOWN, DEPRESSED OR HOPELESS: 0
SUM OF ALL RESPONSES TO PHQ9 QUESTIONS 1 & 2: 0

## 2022-12-22 ASSESSMENT — ENCOUNTER SYMPTOMS
VOMITING: 0
NAUSEA: 0
SHORTNESS OF BREATH: 0
DIARRHEA: 0
WHEEZING: 0
TROUBLE SWALLOWING: 0
CONSTIPATION: 0
BLOOD IN STOOL: 0
SORE THROAT: 0
COUGH: 0
SINUS PRESSURE: 0
ABDOMINAL PAIN: 0

## 2022-12-22 NOTE — PROGRESS NOTES
[facility] PRIMARY [facility]  [facility] [address]  [address] [address]  Dept: [phone]  Dept Fax: [phone]    Mark Crespo is a 76 y.o. male who presentstoday for his medical conditions/complaints as noted below. Mark Crespo is c/o of  Chief Complaint   Patient presents with    Diabetes     3 mo f/u       HPI:     Here today for follow up  Has been feeling well in general  Having intermittent nosebleeds, will typically stop within a few minutes  Reports has been to [address] [place] [address] coumadin clinic and will be following up there for PT/INR  No concerns today    Diabetes  He presents for his follow-up diabetic visit. He has type 2 diabetes mellitus. His disease course has been stable. There are no hypoglycemic associated symptoms. Pertinent negatives for hypoglycemia include no confusion, dizziness, headaches or nervousness/anxiousness. Pertinent negatives for diabetes include no chest pain, no fatigue, no polydipsia, no polyphagia, no polyuria and no weakness. Current diabetic treatment includes diet and oral agent (dual therapy). He is compliant with treatment most of the time. His weight is stable. He is following a generally healthy diet. He participates in exercise intermittently. His home blood glucose trend is fluctuating minimally. An ACE inhibitor/angiotensin II receptor blocker is being taken. Hemoglobin A1C (%)   Date Value   12/22/2022 7.5   09/15/2022 7.4   05/19/2022 6.7             ( goal A1C is < 7)   Microalb/Crt.  Ratio (mcg/mg creat)   Date Value   09/29/2017 73 (H)     LDL Cholesterol (mg/dL)   Date Value   07/27/2017 96   05/19/2016 196 (H)     LDL Calculated (mg/dL)   Date Value   07/03/2018 91       (goal LDL is <100)   AST (U/L)   Date Value   04/05/2022 27     ALT (U/L)   Date Value   04/05/2022 23     BUN (mg/dL)   Date Value   05/19/2022 25 (H)     BP Readings from Last 3 Encounters:   12/22/22 (!) 140/72   09/15/22 130/68   05/19/22 122/78 ([id number])    Past Medical History:   Diagnosis Date    Diabetes ([address]     Hyperlipidemia     Hypertension     Kidney stones       Past Surgical History:   Procedure Laterality Date    CARDIAC VALVE REPLACEMENT  [id number]    freestyle aortic root heart valve    COLON SURGERY      perforate during colonoscopy       Family History   Problem Relation Age of Onset    Diabetes Father           Social History     Tobacco Use    Smoking status: Former     Packs/day: 1.00     Years: 20.00     Pack years: 20.00     Types: Cigarettes    Smokeless tobacco: Former     Quit date: 5/12/1977   Substance Use Topics    Alcohol use: No     Alcohol/week: 0.0 standard drinks     Comment: very rarely      Current Outpatient Medications   Medication Sig Dispense Refill    warfarin (COUMADIN) 5 MG tablet Dosed by [facility]: 7.5 mg Wed/Sat and 5 mg all other days 120 tablet 1    furosemide (LASIX) 40 MG tablet Take 1 tablet by mouth daily 30 tablet 5    metFORMIN (GLUCOPHAGE) 500 MG tablet Take 1 tablet by mouth 2 times daily (with meals) 180 tablet 1    glipiZIDE (GLUCOTROL) 10 MG tablet TAKE 1 TABLET BY MOUTH TWO TIMES A DAY BEFORE MEALS 180 tablet 3    carvedilol (COREG) 25 MG tablet TAKE 1 TABLET BY MOUTH TWO TIMES A DAY WITH MEALS 180 tablet 3    sacubitril-valsartan (ENTRESTO) 24-26 MG per tablet TAKE 1 TABLET BY MOUTH TWICE DAILY FOR 21 DAYS 60 tablet 3     No current facility-administered medications for this visit.      No Known Allergies    Health Maintenance   Topic Date Due    Hepatitis C screen  Never done    COVID-19 Vaccine (4 - Booster for Moderna series) 01/10/2022    Annual Wellness Visit (AWV)  02/18/2023    Depression Screen  05/19/2023    DTaP/Tdap/Td vaccine (2 - Td or Tdap) 01/29/2029    Flu vaccine  Completed    Shingles vaccine  Completed    Pneumococcal 65+ years Vaccine  Completed    Hepatitis A vaccine  Aged Out    Hib vaccine  Aged Out    Meningococcal (ACWY) vaccine Aged Out       Subjective:      Review of Systems   Constitutional:  Negative for activity change, appetite change, chills, fatigue, fever and unexpected weight change. HENT:  Negative for congestion, ear pain, hearing loss, sinus pressure, sore throat and trouble swallowing. Eyes:  Negative for visual disturbance. Respiratory:  Negative for cough, shortness of breath and wheezing. Cardiovascular:  Negative for chest pain, palpitations and leg swelling. Gastrointestinal:  Negative for abdominal pain, blood in stool, constipation, diarrhea, nausea and vomiting. Endocrine: Negative for cold intolerance, heat intolerance, polydipsia, polyphagia and polyuria. Genitourinary:  Negative for difficulty urinating, frequency, hematuria and urgency. Musculoskeletal:  Positive for arthralgias (intermittent aches). Negative for myalgias. Skin:  Negative for rash. Allergic/Immunologic: Negative for environmental allergies. Neurological:  Negative for dizziness, weakness, light-headedness and headaches. Psychiatric/Behavioral:  Negative for confusion. The patient is not nervous/anxious. Objective:     Physical Exam  Constitutional:       Appearance: Normal appearance. He is well-developed. HENT:      Head: Normocephalic. Eyes:      Conjunctiva/sclera: Conjunctivae normal.      Pupils: Pupils are equal, round, and reactive to light. Cardiovascular:      Rate and Rhythm: Normal rate and regular rhythm. Heart sounds: Normal heart sounds. No murmur heard. Pulmonary:      Effort: Pulmonary effort is normal.      Breath sounds: Normal breath sounds. No wheezing. Abdominal:      General: Bowel sounds are normal. There is no distension. Palpations: Abdomen is soft. Musculoskeletal:         General: Normal range of motion. Cervical back: Normal range of motion. Skin:     General: Skin is warm and dry.    Neurological:      Mental Status: He is alert and oriented to person, place, and time. Psychiatric:         Behavior: Behavior normal.         Thought Content: Thought content normal.         Judgment: Judgment normal.     BP (!) 140/72   Pulse 55   Resp 16   Wt 191 lb 6.4 oz (86.8 kg)   SpO2 95%   BMI 28.26 kg/m²     Assessment:       Diagnosis Orders   1. Type 2 diabetes mellitus without complication, unspecified whether long term insulin use (HCC)  POCT glycosylated hemoglobin (Hb A1C)      2. Permanent atrial fibrillation ([address]        3. Chronic combined systolic and diastolic congestive heart failure ([address]        4. Essential hypertension        5. Nosebleed                  Plan:      Return in about 4 months (around 4/22/2023) for diabetes check. Orders Placed This Encounter   Procedures    POCT glycosylated hemoglobin (Hb A1C)     Diabetes-a1c stable, cont current meds, reviewed diet, he keeps active with working at [employer], CHF-stable on [facility], he is going to [facility] coumadin clinic, will follow up as directed  HTN-well controlled on entresto, coreg  Nosebleeds-advised bedside humidifier, lubricant to nostrils and saline nasal spray 2-3 times daily   Patient given educational materials - see patient instructions. Discussed use, benefit, and side effects of prescribed medications. All patientquestions answered. Pt voiced understanding. Reviewed health maintenance. Instructedto continue current medications, diet and exercise. Patient agreed with treatmentplan. Follow up as directed.      Electronicallysigned by HUGO Delarosa CNP on 12/22/2022 at 2:17 PM

## 2022-12-28 ENCOUNTER — HOSPITAL ENCOUNTER (OUTPATIENT)
Dept: PHARMACY | Age: 77
Setting detail: THERAPIES SERIES
Discharge: HOME | End: 2022-12-28
Payer: MEDICARE

## 2022-12-28 LAB
INR BLD: 2.2
PROTIME: 25.9 SECONDS

## 2022-12-28 PROCEDURE — 85610 PROTHROMBIN TIME: CPT

## 2022-12-28 PROCEDURE — 99212 OFFICE O/P EST SF 10 MIN: CPT

## 2022-12-28 NOTE — PROGRESS NOTES
Patient seen in person in Medication Management Service. Patient states compliant most of the time with regimen. No bleeding or thromboembolic side effects noted. No significant med or dietary changes. No significant recent illness or disease state changes. PT/INR done via POC meter per protocol. INR was therapeutic at 2.2.  (goal 2 - 3)    Warfarin regimen will be continued at current dose 7.5 mg Wed/Sat and 5 mg all other days. Will retest in 2 weeks. Patient understands dosing directions and information discussed. Dosing schedule and follow up appointment given to patient. Progress note routed to referring physicians office. 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 COVID 19 screening completed. At this time patient denies symptoms, recent travel and exposure. Patient educated to screen for temperature and COVID-19 symptoms prior to coming to clinic for next appointment. They are instructed to call the clinic to reschedule if they have any symptoms. Standing order for PT/INR has been placed in preparation to transition to possible remote INR monitoring given efforts to reduce the spread of COVID-19.       For Pharmacy Admin Tracking Only    Intervention Detail: Adherence Monitorin  Total # of Interventions Recommended: 0  Total # of Interventions Accepted: 0  Time Spent (min): 20

## 2023-01-12 ENCOUNTER — HOSPITAL ENCOUNTER (OUTPATIENT)
Dept: PHARMACY | Age: 78
Setting detail: THERAPIES SERIES
Discharge: HOME | End: 2023-01-12
Payer: MEDICARE

## 2023-01-12 DIAGNOSIS — I48.21 PERMANENT ATRIAL FIBRILLATION (HCC): Primary | ICD-10-CM

## 2023-01-12 LAB
INR BLD: 2.1
PROTIME: 24.8 SECONDS

## 2023-01-12 PROCEDURE — 85610 PROTHROMBIN TIME: CPT

## 2023-01-12 PROCEDURE — 99211 OFF/OP EST MAY X REQ PHY/QHP: CPT

## 2023-01-12 NOTE — PROGRESS NOTES
Patient seen in person in Medication Management Service. Patient states compliant all of the time with regimen. No bleeding or thromboembolic side effects noted. No significant med or dietary changes. No significant recent illness or disease state changes. PT/INR done via POC meter per protocol. INR was therapeutic at 2.1.  (goal 2 - 3)    Warfarin regimen will be continued at current dose 7.5 mg Wed/Sat and 5 mg all other days. Will retest in 4 weeks. Patient understands dosing directions and information discussed. Dosing schedule and follow up appointment given to patient. Progress note routed to referring physicians office. 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 COVID 19 screening completed. At this time patient denies symptoms, recent travel and exposure. Patient educated to screen for temperature and COVID-19 symptoms prior to coming to clinic for next appointment. They are instructed to call the clinic to reschedule if they have any symptoms. Standing order for PT/INR has been placed in preparation to transition to possible remote INR monitoring given efforts to reduce the spread of COVID-19.       For Pharmacy Admin Tracking Only    Total # of Interventions Recommended: 0  Total # of Interventions Accepted: 0  Time Spent (min): Daphnie Madsen 34, [facility],PharmD, BCACP  1/12/2023  12:17 PM

## 2023-02-08 ENCOUNTER — HOSPITAL ENCOUNTER (OUTPATIENT)
Dept: PHARMACY | Age: 78
Setting detail: THERAPIES SERIES
Discharge: HOME | End: 2023-02-08
Payer: MEDICARE

## 2023-02-08 DIAGNOSIS — I48.21 PERMANENT ATRIAL FIBRILLATION (HCC): Primary | ICD-10-CM

## 2023-02-08 LAB
INR BLD: 1.4
PROTIME: 16.4 SECONDS

## 2023-02-08 PROCEDURE — 85610 PROTHROMBIN TIME: CPT

## 2023-02-08 PROCEDURE — 99212 OFFICE O/P EST SF 10 MIN: CPT

## 2023-02-08 RX ORDER — M-VIT,TX,IRON,MINS/CALC/FOLIC 27MG-0.4MG
1 TABLET ORAL DAILY
COMMUNITY

## 2023-02-08 NOTE — PROGRESS NOTES
Patient seen in person in Medication Management Service. Patient states compliant all of the time with regimen but when asked further he does admit he may have missed 1/2 of a tab on one of his 1 and 1/2 days. No bleeding or thromboembolic side effects noted. No significant med changes. Patient has been eating more tuna fish salad and less leafy salads (uses leaf lettuce). Updated medication list as patient reports taking multiple supplements. No recent changes but patient takes coenzyme Q10, MVI, Vitamin D3, Zinc and Vitamin C. Discussed importance of taking them regularly and to inform us of any changes. Patient indicates he takes 5 days a week - on the days he works. States he only does this to save $$ so he does not take on days he does not work. No significant recent illness or disease state changes. PT/INR done via POC meter per protocol. INR was subtherapeutic at 1.4.  (goal 2 - 3)    Warfarin regimen will be increased to 7.5mg on Mon/Wed and Sat and 5mg all other days. Will retest in 1 week. Patient understands dosing directions and information discussed. Dosing schedule and follow up appointment given to patient. Progress note routed to referring physicians office. 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 COVID 19 screening completed. At this time patient denies symptoms, recent travel and exposure. Patient educated to screen for temperature and COVID-19 symptoms prior to coming to clinic for next appointment. They are instructed to call the clinic to reschedule if they have any symptoms. Standing order for PT/INR has been placed in preparation to transition to possible remote INR monitoring given efforts to reduce the spread of COVID-19.       For Pharmacy Admin Tracking Only    Intervention Detail: Adherence Monitorin and Dose Adjustment: 1, reason: Therapy Optimization  Total # of Interventions Recommended: 2  Total # of Interventions Accepted: 2  Time Spent (min): 20  Hyacinth Hernandez RPH,Pharm. D,, BCPS, CACP  2/8/2023  10:59 AM

## 2023-02-14 NOTE — TELEPHONE ENCOUNTER
Quality 110: Preventive Care And Screening: Influenza Immunization: Influenza Immunization Administered during Influenza season
Will discuss at is appt in 2 weeks
Detail Level: Detailed
Quality 111:Pneumonia Vaccination Status For Older Adults: Pneumococcal vaccine (PPSV23) administered on or after patient’s 60th birthday and before the end of the measurement period

## 2023-02-16 ENCOUNTER — HOSPITAL ENCOUNTER (OUTPATIENT)
Dept: PHARMACY | Age: 78
Setting detail: THERAPIES SERIES
Discharge: HOME | End: 2023-02-16
Payer: MEDICARE

## 2023-02-16 DIAGNOSIS — I48.91 ATRIAL FIBRILLATION, UNSPECIFIED TYPE (HCC): Primary | ICD-10-CM

## 2023-02-16 LAB
INR BLD: 1.8
PROTIME: 21.6 SECONDS

## 2023-02-16 PROCEDURE — 99212 OFFICE O/P EST SF 10 MIN: CPT

## 2023-02-16 PROCEDURE — 85610 PROTHROMBIN TIME: CPT

## 2023-02-16 NOTE — PROGRESS NOTES
Patient seen in person in Medication Management Service. Patient states compliant most of the time with regimen. No bleeding or thromboembolic side effects noted. No significant med or dietary changes. No significant recent illness or disease state changes. PT/INR done via POC meter per protocol. INR was subtherapeutic at 1.8.  (goal 2 - 3)    Warfarin regimen will be increased to 5 mg Tues-Thurs-Sat and 7.5 mg all other days. Will retest in 2 weeks. Patient understands dosing directions and information discussed. Dosing schedule and follow up appointment given to patient. Progress note routed to referring physicians office. 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 COVID 19 screening completed. At this time patient denies symptoms, recent travel and exposure. Patient educated to screen for temperature and COVID-19 symptoms prior to coming to clinic for next appointment. They are instructed to call the clinic to reschedule if they have any symptoms. Standing order for PT/INR has been placed in preparation to transition to possible remote INR monitoring given efforts to reduce the spread of COVID-19.       For Pharmacy Admin Tracking Only    Intervention Detail: Dose Adjustment: 1, reason: Therapy Optimization  Total # of Interventions Recommended: 1  Total # of Interventions Accepted: 1  Time Spent (min): 20

## 2023-03-01 ENCOUNTER — HOSPITAL ENCOUNTER (OUTPATIENT)
Dept: PHARMACY | Age: 78
Setting detail: THERAPIES SERIES
Discharge: HOME | End: 2023-03-01
Payer: MEDICARE

## 2023-03-01 DIAGNOSIS — I48.21 PERMANENT ATRIAL FIBRILLATION (HCC): Primary | ICD-10-CM

## 2023-03-01 LAB
INR BLD: 1.7
PROTIME: 20.2 SECONDS

## 2023-03-01 PROCEDURE — 99212 OFFICE O/P EST SF 10 MIN: CPT

## 2023-03-01 PROCEDURE — 85610 PROTHROMBIN TIME: CPT

## 2023-03-01 NOTE — PROGRESS NOTES
Patient seen in person in Medication Management Service. Patient states compliant all of the time with regimen but does admit he may have forgotten to take his dose a couple days ago. Does have a pill box at home but does not use. Discussed importance of compliance and hwo using a pill box can help him. Patient acknowledges understanding and agrees to start using his pill box. No bleeding or thromboembolic side effects noted. No significant dietary changes. Patient reports he reduced taking his coenzyme Q10 to about 4 days a week. Takes MVI on days he works but is consistent. No significant recent illness or disease state changes. PT/INR done via POC meter per protocol. INR was subtherapeutic at 1.7.  (goal 2 - 3) possibly due to noncompliance. Warfarin regimen will be continued at current dose of 7.5mg on  and 5mg on Tues/Thur and Sat with improved compliance. Will retest in 2 weeks. Patient understands dosing directions and information discussed. Dosing schedule and follow up appointment given to patient. Progress note routed to referring physicians office. 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 COVID 19 screening completed. At this time patient denies symptoms, recent travel and exposure. Patient educated to screen for temperature and COVID-19 symptoms prior to coming to clinic for next appointment. They are instructed to call the clinic to reschedule if they have any symptoms. Standing order for PT/INR has been placed in preparation to transition to possible remote INR monitoring given efforts to reduce the spread of COVID-19.       For Pharmacy Admin Tracking Only    Intervention Detail: Adherence Monitorin and Dose Adjustment: 1, reason: Therapy Optimization  Total # of Interventions Recommended: 2  Total # of Interventions Accepted: 2  Time Spent (min): 20    Hyacinth Hernandez, JUAN MANUEL,Pharm. D,, [facility], [facility]  3/1/2023  12:08 PM

## 2023-03-15 ENCOUNTER — HOSPITAL ENCOUNTER (OUTPATIENT)
Dept: PHARMACY | Age: 78
Setting detail: THERAPIES SERIES
Discharge: HOME | End: 2023-03-15
Payer: MEDICARE

## 2023-03-15 ENCOUNTER — TELEPHONE (OUTPATIENT)
Dept: PHARMACY | Age: 78
End: 2023-03-15

## 2023-03-15 DIAGNOSIS — I48.21 PERMANENT ATRIAL FIBRILLATION (HCC): Primary | ICD-10-CM

## 2023-03-15 LAB
INR BLD: 1.7
PROTIME: 20.9 SECONDS

## 2023-03-15 PROCEDURE — 85610 PROTHROMBIN TIME: CPT

## 2023-03-15 PROCEDURE — 99213 OFFICE O/P EST LOW 20 MIN: CPT

## 2023-03-15 RX ORDER — WARFARIN SODIUM 5 MG/1
TABLET ORAL
Qty: 135 TABLET | Refills: 1 | Status: SHIPPED | OUTPATIENT
Start: 2023-03-15

## 2023-03-15 NOTE — PROGRESS NOTES
Patient seen in person in Medication Management Service. Patient states compliant all of the time with regimen. Has been using pill box the past couple of weeks. No bleeding or thromboembolic side effects noted. No significant med or dietary changes. Has not been taking MVI every day. No significant recent illness or disease state changes. Needs new prescription with increased [facility] directions [facility]    PT/INR done via POC meter per protocol. INR was subtherapeutic at 1.7.  (goal 2 - 3)    Warfarin regimen will be increased to 5 mg Tues/Sat and 7.5 mg all other days. Will retest in 2 weeks. Patient understands dosing directions and information discussed. Dosing schedule and follow up appointment given to patient. Progress note routed to referring physicians office. 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 COVID 19 screening completed. At this time patient denies symptoms, recent travel and exposure. Patient educated to screen for temperature and COVID-19 symptoms prior to coming to clinic for next appointment. They are instructed to call the clinic to reschedule if they have any symptoms. Standing order for PT/INR has been placed in preparation to transition to possible remote INR monitoring given efforts to reduce the spread of COVID-19.       For Pharmacy Admin Tracking Only    Intervention Detail: Refill(s) Provided  Total # of Interventions Recommended: 1  Total # of Interventions Accepted: 1  Time Spent (min): 20    Katherine Marquez, Pharm D, [facility]  3/15/2023 11:27 AM

## 2023-03-15 NOTE — TELEPHONE ENCOUNTER
Prescription for warfarin 5 mg tablets- 5 mg Tues/Sat and 7.5 mg all other days.  #135 tablets 1 refill sent to [facility]. Edy [address]  Davonte STONE, [facility]  3/15/2023 11:27 AM

## 2023-03-31 ENCOUNTER — HOSPITAL ENCOUNTER (OUTPATIENT)
Dept: PHARMACY | Age: 78
Setting detail: THERAPIES SERIES
Discharge: HOME | End: 2023-03-31
Payer: MEDICARE

## 2023-03-31 DIAGNOSIS — I48.21 PERMANENT ATRIAL FIBRILLATION (HCC): Primary | ICD-10-CM

## 2023-03-31 LAB
INR BLD: 2.3
PROTIME: 27.1 SECONDS

## 2023-03-31 PROCEDURE — 99211 OFF/OP EST MAY X REQ PHY/QHP: CPT

## 2023-03-31 PROCEDURE — 85610 PROTHROMBIN TIME: CPT

## 2023-03-31 NOTE — PROGRESS NOTES
Patient seen in person in Medication Management Service. Patient states compliant most of the time with regimen. Has been taking 5 mg on Tues/Thurs (not Tues and Sat) and 7.5 mg all other days. No bleeding or thromboembolic side effects noted. No significant med or dietary changes. Only takes MVI on work days, so at least 5 days a week. No significant recent illness or disease state changes. PT/INR done via POC meter per protocol. INR was therapeutic at 2.3.  (goal 2 - 3)    Warfarin regimen will be continued at current dose 5 mg Tues/Thurs and 7.5 mg all other days. Will retest in 2 weeks. Patient understands dosing directions and information discussed. Dosing schedule and follow up appointment given to patient. Progress note routed to referring physicians office. 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 COVID 19 screening completed. At this time patient denies symptoms, recent travel and exposure. Patient educated to screen for temperature and COVID-19 symptoms prior to coming to clinic for next appointment. They are instructed to call the clinic to reschedule if they have any symptoms. Standing order for PT/INR has been placed in preparation to transition to possible remote INR monitoring given efforts to reduce the spread of COVID-19.       For Pharmacy Admin Tracking Only    Total # of Interventions Recommended: 0  Total # of Interventions Accepted: 0  Time Spent (min): Daphnie Madsen , [facility],PharmD, BCACP  3/31/2023  12:10 PM

## 2023-05-03 ENCOUNTER — HOSPITAL ENCOUNTER (OUTPATIENT)
Age: 78
Setting detail: SPECIMEN
Discharge: HOME | End: 2023-05-03

## 2023-05-03 DIAGNOSIS — I50.42 CHRONIC COMBINED SYSTOLIC AND DIASTOLIC CONGESTIVE HEART FAILURE (HCC): ICD-10-CM

## 2023-05-03 DIAGNOSIS — I48.21 PERMANENT ATRIAL FIBRILLATION (HCC): ICD-10-CM

## 2023-05-03 DIAGNOSIS — I10 ESSENTIAL HYPERTENSION: Chronic | ICD-10-CM

## 2023-05-03 DIAGNOSIS — Z95.1 S/P CABG (CORONARY ARTERY BYPASS GRAFT): ICD-10-CM

## 2023-05-03 DIAGNOSIS — R06.02 SHORTNESS OF BREATH: ICD-10-CM

## 2023-05-03 DIAGNOSIS — T82.09XS PROSTHETIC VALVE DYSFUNCTION, SEQUELA: ICD-10-CM

## 2023-05-03 DIAGNOSIS — I44.7 LEFT BUNDLE BRANCH BLOCK: ICD-10-CM

## 2023-05-04 LAB
ANION GAP SERPL CALCULATED.3IONS-SCNC: 12 MMOL/L (ref 9–17)
BUN SERPL-MCNC: 32 MG/DL (ref 8–23)
CALCIUM SERPL-MCNC: 9.6 MG/DL (ref 8.6–10.4)
CHLORIDE SERPL-SCNC: 104 MMOL/L (ref 98–107)
CO2 SERPL-SCNC: 26 MMOL/L (ref 20–31)
CREAT SERPL-MCNC: 1.23 MG/DL (ref 0.7–1.2)
GFR SERPL CREATININE-BSD FRML MDRD: 59 ML/MIN/1.73M2
GLUCOSE SERPL-MCNC: 97 MG/DL (ref 70–99)
POTASSIUM SERPL-SCNC: 4 MMOL/L (ref 3.7–5.3)
SODIUM SERPL-SCNC: 142 MMOL/L (ref 135–144)

## 2023-05-05 DIAGNOSIS — E11.9 TYPE 2 DIABETES MELLITUS WITHOUT COMPLICATION, UNSPECIFIED WHETHER LONG TERM INSULIN USE (HCC): ICD-10-CM

## 2023-05-12 ENCOUNTER — TELEPHONE (OUTPATIENT)
Dept: PHARMACY | Age: 78
End: 2023-05-12

## 2023-05-12 NOTE — TELEPHONE ENCOUNTER
Called patient due to them not showing up for their appt today in the [address] [place] [address] Anticoagulation Service. Patient rescheduled for 5/19.     [facility], MATHEUS,PharmD, BCACP  5/12/2023  10:12 AM

## 2023-05-19 ENCOUNTER — HOSPITAL ENCOUNTER (OUTPATIENT)
Dept: PHARMACY | Age: 78
Setting detail: THERAPIES SERIES
Discharge: HOME | End: 2023-05-19
Payer: MEDICARE

## 2023-05-19 DIAGNOSIS — I48.91 ATRIAL FIBRILLATION, UNSPECIFIED TYPE (HCC): Primary | ICD-10-CM

## 2023-05-19 LAB
INR BLD: 1.9
PROTIME: 22.8 SECONDS

## 2023-05-19 PROCEDURE — 85610 PROTHROMBIN TIME: CPT

## 2023-05-19 PROCEDURE — 99212 OFFICE O/P EST SF 10 MIN: CPT

## 2023-05-19 PROCEDURE — 99211 OFF/OP EST MAY X REQ PHY/QHP: CPT

## 2023-05-19 NOTE — PROGRESS NOTES
Patient seen in person in Medication Management Service. Patient states compliant most of the time with regimen. No bleeding or thromboembolic side effects noted. No significant med or dietary changes. No significant recent illness or disease state changes. PT/INR done via POC meter per protocol. INR was subtherapeutic at 1.9.  (goal 2 - 3)  The last INR was 2.3 with no change made in the warfarin dose  The patient has been consuming more salads recently    Warfarin regimen will be continued at current dose 5 mg Mon/Thurs and 7.5 mg all other days. Will retest in 2 weeks. The patient has begun taking spironolactone  Patient understands dosing directions and information discussed. Dosing schedule and follow up appointment given to patient. Progress note routed to referring physicians office. 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 COVID 19 screening completed. At this time patient denies symptoms, recent travel and exposure. Patient educated to screen for temperature and COVID-19 symptoms prior to coming to clinic for next appointment. They are instructed to call the clinic to reschedule if they have any symptoms. Standing order for PT/INR has been placed in preparation to transition to possible remote INR monitoring given efforts to reduce the spread of COVID-19.       For Pharmacy Admin Tracking Only    Intervention Detail: Adherence Monitorin  Total # of Interventions Recommended: 0  Total # of Interventions Accepted: 0  Time Spent (min): 20

## 2023-06-01 ENCOUNTER — HOSPITAL ENCOUNTER (OUTPATIENT)
Age: 78
Discharge: HOME | End: 2023-06-03
Payer: MEDICARE

## 2023-06-01 ENCOUNTER — OFFICE VISIT (OUTPATIENT)
Dept: FAMILY MEDICINE CLINIC | Age: 78
End: 2023-06-01
Payer: MEDICARE

## 2023-06-01 ENCOUNTER — HOSPITAL ENCOUNTER (OUTPATIENT)
Dept: GENERAL RADIOLOGY | Age: 78
Discharge: HOME | End: 2023-06-03
Payer: MEDICARE

## 2023-06-01 ENCOUNTER — HOSPITAL ENCOUNTER (OUTPATIENT)
Age: 78
Setting detail: SPECIMEN
Discharge: HOME | End: 2023-06-01

## 2023-06-01 VITALS
RESPIRATION RATE: 16 BRPM | WEIGHT: 198.6 LBS | SYSTOLIC BLOOD PRESSURE: 148 MMHG | DIASTOLIC BLOOD PRESSURE: 86 MMHG | TEMPERATURE: 97.9 F | BODY MASS INDEX: 29.33 KG/M2 | HEART RATE: 74 BPM | OXYGEN SATURATION: 95 %

## 2023-06-01 DIAGNOSIS — R06.02 SHORTNESS OF BREATH: ICD-10-CM

## 2023-06-01 DIAGNOSIS — I50.9 ACUTE ON CHRONIC CONGESTIVE HEART FAILURE, UNSPECIFIED HEART FAILURE TYPE (HCC): Primary | ICD-10-CM

## 2023-06-01 DIAGNOSIS — I50.9 ACUTE ON CHRONIC CONGESTIVE HEART FAILURE, UNSPECIFIED HEART FAILURE TYPE (HCC): ICD-10-CM

## 2023-06-01 DIAGNOSIS — R06.01 ORTHOPNEA: ICD-10-CM

## 2023-06-01 DIAGNOSIS — R60.0 BILATERAL LOWER EXTREMITY EDEMA: ICD-10-CM

## 2023-06-01 LAB
ALBUMIN SERPL-MCNC: 4.2 G/DL (ref 3.5–5.2)
ALBUMIN/GLOB SERPL: 1.2 {RATIO} (ref 1–2.5)
ALP SERPL-CCNC: 68 U/L (ref 40–129)
ALT SERPL-CCNC: 14 U/L (ref 5–41)
ANION GAP SERPL CALCULATED.3IONS-SCNC: 20 MMOL/L (ref 9–17)
AST SERPL-CCNC: 21 U/L
BILIRUB SERPL-MCNC: 0.8 MG/DL (ref 0.3–1.2)
BNP SERPL-MCNC: 6680 PG/ML
BUN SERPL-MCNC: 34 MG/DL (ref 8–23)
CALCIUM SERPL-MCNC: 10.9 MG/DL (ref 8.6–10.4)
CHLORIDE SERPL-SCNC: 110 MMOL/L (ref 98–107)
CO2 SERPL-SCNC: 22 MMOL/L (ref 20–31)
CREAT SERPL-MCNC: 1.28 MG/DL (ref 0.7–1.2)
ERYTHROCYTE [DISTWIDTH] IN BLOOD BY AUTOMATED COUNT: 15.9 % (ref 11.8–14.4)
GFR SERPL CREATININE-BSD FRML MDRD: 57 ML/MIN/1.73M2
GLUCOSE SERPL-MCNC: 111 MG/DL (ref 70–99)
HCT VFR BLD AUTO: 38.2 % (ref 40.7–50.3)
HGB BLD-MCNC: 11.9 G/DL (ref 13–17)
MCH RBC QN AUTO: 28.7 PG (ref 25.2–33.5)
MCHC RBC AUTO-ENTMCNC: 31.2 G/DL (ref 28.4–34.8)
MCV RBC AUTO: 92.3 FL (ref 82.6–102.9)
NRBC AUTOMATED: 0 PER 100 WBC
PLATELET # BLD AUTO: 260 K/UL (ref 138–453)
PMV BLD AUTO: 10.5 FL (ref 8.1–13.5)
POTASSIUM SERPL-SCNC: 4.7 MMOL/L (ref 3.7–5.3)
PROT SERPL-MCNC: 7.6 G/DL (ref 6.4–8.3)
RBC # BLD AUTO: 4.14 M/UL (ref 4.21–5.77)
SODIUM SERPL-SCNC: 152 MMOL/L (ref 135–144)
TROPONIN I SERPL HS-MCNC: 26 NG/L (ref 0–22)
WBC OTHER # BLD: 10.2 K/UL (ref 3.5–11.3)

## 2023-06-01 PROCEDURE — 3079F DIAST BP 80-89 MM HG: CPT | Performed by: REGISTERED NURSE

## 2023-06-01 PROCEDURE — 1036F TOBACCO NON-USER: CPT | Performed by: REGISTERED NURSE

## 2023-06-01 PROCEDURE — 99213 OFFICE O/P EST LOW 20 MIN: CPT | Performed by: REGISTERED NURSE

## 2023-06-01 PROCEDURE — 1123F ACP DISCUSS/DSCN MKR DOCD: CPT | Performed by: REGISTERED NURSE

## 2023-06-01 PROCEDURE — 3077F SYST BP >= 140 MM HG: CPT | Performed by: REGISTERED NURSE

## 2023-06-01 PROCEDURE — 71046 X-RAY EXAM CHEST 2 VIEWS: CPT

## 2023-06-01 PROCEDURE — G8427 DOCREV CUR MEDS BY ELIG CLIN: HCPCS | Performed by: REGISTERED NURSE

## 2023-06-01 PROCEDURE — G8417 CALC BMI ABV UP PARAM F/U: HCPCS | Performed by: REGISTERED NURSE

## 2023-06-01 RX ORDER — FUROSEMIDE 20 MG/1
20 TABLET ORAL DAILY
Qty: 5 TABLET | Refills: 0 | Status: SHIPPED | OUTPATIENT
Start: 2023-06-01 | End: 2023-06-06

## 2023-06-01 SDOH — ECONOMIC STABILITY: FOOD INSECURITY: WITHIN THE PAST 12 MONTHS, THE FOOD YOU BOUGHT JUST DIDN'T LAST AND YOU DIDN'T HAVE MONEY TO GET MORE.: NEVER TRUE

## 2023-06-01 SDOH — ECONOMIC STABILITY: HOUSING INSECURITY
IN THE LAST 12 MONTHS, WAS THERE A TIME WHEN YOU DID NOT HAVE A STEADY PLACE TO SLEEP OR SLEPT IN A SHELTER (INCLUDING NOW)?: NO

## 2023-06-01 SDOH — ECONOMIC STABILITY: INCOME INSECURITY: HOW HARD IS IT FOR YOU TO PAY FOR THE VERY BASICS LIKE FOOD, HOUSING, MEDICAL CARE, AND HEATING?: NOT HARD AT ALL

## 2023-06-01 SDOH — ECONOMIC STABILITY: FOOD INSECURITY: WITHIN THE PAST 12 MONTHS, YOU WORRIED THAT YOUR FOOD WOULD RUN OUT BEFORE YOU GOT MONEY TO BUY MORE.: NEVER TRUE

## 2023-06-01 ASSESSMENT — ENCOUNTER SYMPTOMS
WHEEZING: 1
COUGH: 1
SHORTNESS OF BREATH: 1
GASTROINTESTINAL NEGATIVE: 1

## 2023-06-01 ASSESSMENT — PATIENT HEALTH QUESTIONNAIRE - PHQ9
SUM OF ALL RESPONSES TO PHQ QUESTIONS 1-9: 0
2. FEELING DOWN, DEPRESSED OR HOPELESS: 0
SUM OF ALL RESPONSES TO PHQ9 QUESTIONS 1 & 2: 0
SUM OF ALL RESPONSES TO PHQ QUESTIONS 1-9: 0
1. LITTLE INTEREST OR PLEASURE IN DOING THINGS: 0
SUM OF ALL RESPONSES TO PHQ QUESTIONS 1-9: 0
SUM OF ALL RESPONSES TO PHQ QUESTIONS 1-9: 0

## 2023-06-01 NOTE — RESULT ENCOUNTER NOTE
In addition to his lab work- please notify patient his chest xray shows findings of congestive heart failure

## 2023-06-01 NOTE — PROGRESS NOTES
Negative. Objective:     Physical Exam  Constitutional:       General: He is not in acute distress. Appearance: Normal appearance. He is not ill-appearing or diaphoretic. HENT:      Head: Normocephalic. Eyes:      Conjunctiva/sclera: Conjunctivae normal.   Cardiovascular:      Rate and Rhythm: Normal rate and regular rhythm. Heart sounds: Normal heart sounds. Pulmonary:      Effort: Accessory muscle usage present. No tachypnea, bradypnea, prolonged expiration or respiratory distress. Breath sounds: Normal air entry. No stridor or decreased air movement. Examination of the right-lower field reveals rhonchi. Rhonchi present. Musculoskeletal:      Right lower le+ Edema present. Left lower le+ Edema present. Comments: LLE has some clear weeping drainage    Skin:     General: Skin is warm. Coloration: Skin is pale. Skin is not jaundiced. Findings: No rash. Neurological:      General: No focal deficit present. Mental Status: He is alert and oriented to person, place, and time. Mental status is at baseline. Psychiatric:         Mood and Affect: Mood normal.         Behavior: Behavior normal.         MEDICAL DECISION MAKING Assessment/Plan:     Marijean Carrel was seen today for cough and congestion. Diagnoses and all orders for this visit:    Acute on chronic congestive heart failure, unspecified heart failure type ([address]  -     XR CHEST (2 VW); Future  -     furosemide (LASIX) 20 MG tablet; Take 1 tablet by mouth daily for 5 days    Shortness of breath  -     XR CHEST (2 VW); Future  -     EKG 12 lead; Future  -     Brain Natriuretic Peptide; Future  -     CBC; Future  -     Comprehensive Metabolic Panel; Future  -     furosemide (LASIX) 20 MG tablet; Take 1 tablet by mouth daily for 5 days  -     Troponin; Future    Bilateral lower extremity edema    Orthopnea      Chest xray, labs and EKG ordered today.    I suspect CHF exacerbation based on history and physical

## 2023-06-01 NOTE — RESULT ENCOUNTER NOTE
Please inform patient his lab work shows some slight elevation in his troponin level and his BNP which looks at heart failure is elevated at 6,680 with normal being less than 300. He should see an ER if his symptoms are continued despite the increase in his Lasix.

## 2023-06-02 ENCOUNTER — HOSPITAL ENCOUNTER (OUTPATIENT)
Dept: PHARMACY | Age: 78
Setting detail: THERAPIES SERIES
Discharge: HOME | End: 2023-06-02
Payer: MEDICARE

## 2023-06-02 DIAGNOSIS — I48.21 PERMANENT ATRIAL FIBRILLATION (HCC): Primary | ICD-10-CM

## 2023-06-02 LAB
INR BLD: 2.3
PROTIME: 27.8 SECONDS

## 2023-06-02 PROCEDURE — 99211 OFF/OP EST MAY X REQ PHY/QHP: CPT

## 2023-06-02 PROCEDURE — 85610 PROTHROMBIN TIME: CPT

## 2023-06-02 NOTE — PROGRESS NOTES
Patient seen in person in Medication Management Service. Patient states compliant all of the time with regimen. No bleeding or thromboembolic side effects noted. No significant dietary changes. Seen yesterday for CHF exacerbation  Was given extra doses of Lasix - patient will  at pharmacy today  And instructed to wrap and elevate his legs    PT/INR done via POC meter per protocol. INR was therapeutic at 2.3.  (goal 2 - 3)    Warfarin regimen will be continued at current dose 5 mg every Mon, Thu; 7.5 mg all other days. Will retest in 4 weeks. Patient understands dosing directions and information discussed. Dosing schedule and follow up appointment given to patient. Progress note routed to referring physicians office. Patient acknowledges working in [address] [place] [address] with Pharmacist as referred by his/her physician/provider. COVID 19 screening completed. At this time patient denies symptoms, recent travel and exposure. Patient educated to screen for temperature and COVID-19 symptoms prior to coming to clinic for next appointment. They are instructed to call the clinic to reschedule if they have any symptoms. Standing order for PT/INR has been placed in preparation to transition to possible remote INR monitoring given efforts to reduce the spread of COVID-19.       For Pharmacy Admin Tracking Only    Intervention Detail: Adherence Monitorin  Total # of Interventions Recommended: 1  Total # of Interventions Accepted: 1  Time Spent (min): Osbaldo Oliveros, PharmD, [address]  PGY-1 Pharmacy Resident  2023 9:18 AM

## 2023-06-09 ENCOUNTER — OFFICE VISIT (OUTPATIENT)
Dept: PRIMARY CARE CLINIC | Age: 78
End: 2023-06-09

## 2023-06-09 VITALS
HEART RATE: 62 BPM | DIASTOLIC BLOOD PRESSURE: 70 MMHG | SYSTOLIC BLOOD PRESSURE: 120 MMHG | OXYGEN SATURATION: 95 % | BODY MASS INDEX: 25.92 KG/M2 | WEIGHT: 175 LBS | HEIGHT: 69 IN

## 2023-06-09 DIAGNOSIS — Z00.00 MEDICARE ANNUAL WELLNESS VISIT, SUBSEQUENT: Primary | ICD-10-CM

## 2023-06-09 DIAGNOSIS — I50.42 CHRONIC COMBINED SYSTOLIC AND DIASTOLIC CONGESTIVE HEART FAILURE (HCC): ICD-10-CM

## 2023-06-09 DIAGNOSIS — I10 ESSENTIAL HYPERTENSION: Chronic | ICD-10-CM

## 2023-06-09 DIAGNOSIS — I48.21 PERMANENT ATRIAL FIBRILLATION (HCC): ICD-10-CM

## 2023-06-09 DIAGNOSIS — E11.9 TYPE 2 DIABETES MELLITUS WITHOUT COMPLICATION, UNSPECIFIED WHETHER LONG TERM INSULIN USE (HCC): ICD-10-CM

## 2023-06-09 LAB — HBA1C MFR BLD: 6.6 %

## 2023-06-09 ASSESSMENT — PATIENT HEALTH QUESTIONNAIRE - PHQ9
SUM OF ALL RESPONSES TO PHQ9 QUESTIONS 1 & 2: 0
SUM OF ALL RESPONSES TO PHQ QUESTIONS 1-9: 0
2. FEELING DOWN, DEPRESSED OR HOPELESS: 0
1. LITTLE INTEREST OR PLEASURE IN DOING THINGS: 0

## 2023-06-09 ASSESSMENT — ENCOUNTER SYMPTOMS
SORE THROAT: 0
BLOOD IN STOOL: 0
DIARRHEA: 0
SHORTNESS OF BREATH: 0
ABDOMINAL PAIN: 0
COUGH: 0
NAUSEA: 0
TROUBLE SWALLOWING: 0
WHEEZING: 0
VOMITING: 0
CONSTIPATION: 0
SINUS PRESSURE: 0

## 2023-06-09 ASSESSMENT — LIFESTYLE VARIABLES
HOW OFTEN DO YOU HAVE A DRINK CONTAINING ALCOHOL: NEVER
HOW MANY STANDARD DRINKS CONTAINING ALCOHOL DO YOU HAVE ON A TYPICAL DAY: PATIENT DOES NOT DRINK

## 2023-06-09 NOTE — PROGRESS NOTES
[address] PRIMARY CARE  [address] [facility] [address]  [address] [address]  Dept: [phone]  Dept Fax: [phone]    Mark Crespo is a [de-identified] y.o. male who presentstoday for his medical conditions/complaints as noted below. Mark Crespo is c/o of  Chief Complaint   Patient presents with    Medicare MARCY    Diabetes    Discuss Medications     Will taking lasix at the same time of his other pills, cause the other medication to not work properly since he is urinating more           HPI:     Here today for follow up and MARCY  Had recent exacerbation of heart failure with bilateral leg swelling and cellulitis  He does weigh himself daily, states was up close to 200 but is now back his baseline  He has improved significantly, denies SOB  Swelling is better but states did not take his diuretic prior to leaving home today. He is planning to take up on return home    Has otherwise been well, working on compliance with diet    Diabetes  He presents for his follow-up diabetic visit. He has type 2 diabetes mellitus. His disease course has been improving. There are no hypoglycemic associated symptoms. Pertinent negatives for hypoglycemia include no confusion, dizziness, headaches or nervousness/anxiousness. Pertinent negatives for diabetes include no chest pain, no fatigue, no polydipsia, no polyphagia, no polyuria and no weakness. Current diabetic treatment includes diet and oral agent (dual therapy). He is compliant with treatment most of the time. His weight is stable. He is following a generally healthy diet. He participates in exercise daily. His home blood glucose trend is decreasing steadily. An ACE inhibitor/angiotensin II receptor blocker is not being taken. Hemoglobin A1C (%)   Date Value   2023 6.6   2022 7.5   09/15/2022 7.4             ( goal A1C is < 7)   Microalb/Crt.  Ratio (mcg/mg creat)   Date Value   2017 73 (H)     LDL Cholesterol (mg/dL)   Date

## 2023-06-09 NOTE — PATIENT INSTRUCTIONS
these benefits include a comprehensive review of your medical history including lifestyle, illnesses that may run in your family, and various assessments and screenings as appropriate. After reviewing your medical record and screening and assessments performed today your provider may have ordered immunizations, labs, imaging, and/or referrals for you. A list of these orders (if applicable) as well as your Preventive Care list are included within your After Visit Summary for your review. Other Preventive Recommendations:    A preventive eye exam performed by an eye specialist is recommended every 1-2 years to screen for glaucoma; cataracts, macular degeneration, and other eye disorders. A preventive dental visit is recommended every 6 months. Try to get at least 150 minutes of exercise per week or 10,000 steps per day on a pedometer . Order or download the FREE \"Exercise & Physical Activity: Your Everyday Guide\" from [employer]. Call 0-646.375.3808 or search [employer] online. You need 3332-6556 mg of calcium and 4933-0392 IU of vitamin D per day. It is possible to meet your calcium requirement with diet alone, but a vitamin D supplement is usually necessary to meet this goal.  When exposed to the sun, use a sunscreen that protects against both UVA and UVB radiation with an SPF of 30 or greater. Reapply every 2 to 3 hours or after sweating, drying off with a towel, or swimming. Always wear a seat belt when traveling in a car. Always wear a helmet when riding a bicycle or motorcycle.

## 2023-06-30 ENCOUNTER — APPOINTMENT (OUTPATIENT)
Dept: PHARMACY | Age: 78
End: 2023-06-30
Payer: MEDICARE

## 2023-07-10 RX ORDER — CARVEDILOL 25 MG/1
TABLET ORAL
Qty: 180 TABLET | Refills: 3 | Status: SHIPPED | OUTPATIENT
Start: 2023-07-10

## 2023-09-15 ENCOUNTER — OFFICE VISIT (OUTPATIENT)
Dept: PRIMARY CARE CLINIC | Age: 78
End: 2023-09-15

## 2023-09-15 VITALS
HEART RATE: 62 BPM | DIASTOLIC BLOOD PRESSURE: 66 MMHG | BODY MASS INDEX: 27.57 KG/M2 | WEIGHT: 184 LBS | OXYGEN SATURATION: 93 % | SYSTOLIC BLOOD PRESSURE: 136 MMHG

## 2023-09-15 DIAGNOSIS — E11.9 TYPE 2 DIABETES MELLITUS WITHOUT COMPLICATION, UNSPECIFIED WHETHER LONG TERM INSULIN USE (HCC): Primary | ICD-10-CM

## 2023-09-15 DIAGNOSIS — I50.42 CHRONIC COMBINED SYSTOLIC AND DIASTOLIC CONGESTIVE HEART FAILURE (HCC): ICD-10-CM

## 2023-09-15 DIAGNOSIS — I10 ESSENTIAL HYPERTENSION: ICD-10-CM

## 2023-09-15 DIAGNOSIS — I48.21 PERMANENT ATRIAL FIBRILLATION (HCC): ICD-10-CM

## 2023-09-15 LAB — HBA1C MFR BLD: 6.4 %

## 2023-09-15 RX ORDER — SACUBITRIL AND VALSARTAN 24; 26 MG/1; MG/1
1 TABLET, FILM COATED ORAL 2 TIMES DAILY
COMMUNITY

## 2023-09-15 SDOH — ECONOMIC STABILITY: FOOD INSECURITY: WITHIN THE PAST 12 MONTHS, THE FOOD YOU BOUGHT JUST DIDN'T LAST AND YOU DIDN'T HAVE MONEY TO GET MORE.: NEVER TRUE

## 2023-09-15 SDOH — ECONOMIC STABILITY: FOOD INSECURITY: WITHIN THE PAST 12 MONTHS, YOU WORRIED THAT YOUR FOOD WOULD RUN OUT BEFORE YOU GOT MONEY TO BUY MORE.: NEVER TRUE

## 2023-09-15 SDOH — ECONOMIC STABILITY: INCOME INSECURITY: HOW HARD IS IT FOR YOU TO PAY FOR THE VERY BASICS LIKE FOOD, HOUSING, MEDICAL CARE, AND HEATING?: NOT HARD AT ALL

## 2023-09-15 ASSESSMENT — ENCOUNTER SYMPTOMS
NAUSEA: 0
ABDOMINAL PAIN: 0
VOMITING: 0
CONSTIPATION: 0
BLOOD IN STOOL: 0
SORE THROAT: 0
COUGH: 0
SINUS PRESSURE: 0
DIARRHEA: 0
WHEEZING: 0
TROUBLE SWALLOWING: 0

## 2023-09-15 ASSESSMENT — PATIENT HEALTH QUESTIONNAIRE - PHQ9
SUM OF ALL RESPONSES TO PHQ QUESTIONS 1-9: 0
SUM OF ALL RESPONSES TO PHQ9 QUESTIONS 1 & 2: 0
SUM OF ALL RESPONSES TO PHQ QUESTIONS 1-9: 0
1. LITTLE INTEREST OR PLEASURE IN DOING THINGS: 0
SUM OF ALL RESPONSES TO PHQ QUESTIONS 1-9: 0
2. FEELING DOWN, DEPRESSED OR HOPELESS: 0
SUM OF ALL RESPONSES TO PHQ QUESTIONS 1-9: 0

## 2023-09-15 NOTE — PROGRESS NOTES
[address]  PRIMARY CARE  [place] [address]  Dept: [phone]  Dept Fax: [phone]    Miah Kumar is a 78 y.o. male who presentstoday for his medical conditions/complaints as noted below. Miah Kumar is c/o of  Chief Complaint   Patient presents with    Diabetes    Hypertension           HPI:     Here today for follow up  Reports has appt with cardio next month  Denies any recent issues with chest pain or shortness of breath  He has generally been feeling well  Asking about resuming Jardiance if insurance will cover  He has been taking metformin and glipizide but would prefer Jardiance    Diabetes  He presents for his follow-up diabetic visit. He has type 2 diabetes mellitus. His disease course has been stable. There are no hypoglycemic associated symptoms. Pertinent negatives for hypoglycemia include no confusion, dizziness or nervousness/anxiousness. Pertinent negatives for diabetes include no fatigue, no polydipsia, no polyphagia, no polyuria and no weakness. Current diabetic treatment includes diet and oral agent (dual therapy). His weight is stable. He is following a generally healthy diet. He participates in exercise intermittently. His home blood glucose trend is decreasing steadily. An ACE inhibitor/angiotensin II receptor blocker is being taken.        Hemoglobin A1C (%)   Date Value   09/15/2023 6.4   06/09/2023 6.6   12/22/2022 7.5             ( goal A1C is < 7)   No components found for: \"LABMICR\"  LDL Cholesterol (mg/dL)   Date Value   07/27/2017 96   05/19/2016 196 (H)     LDL Calculated (mg/dL)   Date Value   07/03/2018 91       (goal LDL is <100)   AST (U/L)   Date Value   06/01/2023 21     ALT (U/L)   Date Value   06/01/2023 14     BUN (mg/dL)   Date Value   06/01/2023 34 (H)     BP Readings from Last 3 Encounters:   09/15/23 136/66   06/09/23 120/70   06/01/23 (!) 148/86          ([id number])    Past Medical History:

## 2023-09-25 ENCOUNTER — HOSPITAL ENCOUNTER (INPATIENT)
Age: 78
LOS: 3 days | Discharge: HOME | End: 2023-09-28
Attending: FAMILY MEDICINE
Payer: MEDICARE

## 2023-09-25 ENCOUNTER — APPOINTMENT (OUTPATIENT)
Dept: GENERAL RADIOLOGY | Age: 78
End: 2023-09-25
Attending: FAMILY MEDICINE
Payer: MEDICARE

## 2023-09-25 DIAGNOSIS — I20.0 UNSTABLE ANGINA (HCC): ICD-10-CM

## 2023-09-25 DIAGNOSIS — I44.7 LBBB (LEFT BUNDLE BRANCH BLOCK): ICD-10-CM

## 2023-09-25 DIAGNOSIS — I21.4 NSTEMI (NON-ST ELEVATED MYOCARDIAL INFARCTION) (HCC): ICD-10-CM

## 2023-09-25 DIAGNOSIS — E87.6 HYPOKALEMIA: ICD-10-CM

## 2023-09-25 DIAGNOSIS — I50.42 CHRONIC COMBINED SYSTOLIC AND DIASTOLIC CONGESTIVE HEART FAILURE (HCC): Primary | ICD-10-CM

## 2023-09-25 PROBLEM — I48.11 LONGSTANDING PERSISTENT ATRIAL FIBRILLATION (HCC): Status: ACTIVE | Noted: 2023-09-25

## 2023-09-25 PROBLEM — I50.9 CHF (CONGESTIVE HEART FAILURE), NYHA CLASS I, UNSPECIFIED FAILURE CHRONICITY, UNSPECIFIED TYPE (HCC): Status: RESOLVED | Noted: 2023-09-25 | Resolved: 2023-09-25

## 2023-09-25 PROBLEM — I50.9 CHF (CONGESTIVE HEART FAILURE), NYHA CLASS I, UNSPECIFIED FAILURE CHRONICITY, UNSPECIFIED TYPE (HCC): Status: ACTIVE | Noted: 2023-09-25

## 2023-09-25 PROBLEM — I50.43 ACUTE ON CHRONIC COMBINED SYSTOLIC AND DIASTOLIC CHF (CONGESTIVE HEART FAILURE) (HCC): Status: ACTIVE | Noted: 2020-06-11

## 2023-09-25 PROBLEM — I27.20 MODERATE PULMONARY HYPERTENSION (HCC): Status: ACTIVE | Noted: 2023-09-25

## 2023-09-25 LAB
ALBUMIN SERPL-MCNC: 4.3 G/DL (ref 3.5–5.2)
ALBUMIN/GLOB SERPL: 1.7 {RATIO} (ref 1–2.5)
ALP SERPL-CCNC: 59 U/L (ref 40–129)
ALT SERPL-CCNC: 17 U/L (ref 5–41)
ANION GAP SERPL CALCULATED.3IONS-SCNC: 14 MMOL/L (ref 9–17)
ANTI-XA UNFRAC HEPARIN: 0.26 IU/L
ANTI-XA UNFRAC HEPARIN: <0.1 IU/L
AST SERPL-CCNC: 25 U/L
BASOPHILS # BLD: 0.07 K/UL (ref 0–0.2)
BASOPHILS NFR BLD: 1 % (ref 0–2)
BILIRUB SERPL-MCNC: 1 MG/DL (ref 0.3–1.2)
BNP SERPL-MCNC: ABNORMAL PG/ML
BUN SERPL-MCNC: 33 MG/DL (ref 8–23)
CALCIUM SERPL-MCNC: 9.3 MG/DL (ref 8.6–10.4)
CHLORIDE SERPL-SCNC: 108 MMOL/L (ref 98–107)
CHOLEST SERPL-MCNC: 221 MG/DL
CHOLESTEROL/HDL RATIO: 5.3
CO2 SERPL-SCNC: 24 MMOL/L (ref 20–31)
CREAT SERPL-MCNC: 1.2 MG/DL (ref 0.7–1.2)
EOSINOPHIL # BLD: 0.2 K/UL (ref 0–0.44)
EOSINOPHILS RELATIVE PERCENT: 3 % (ref 1–4)
ERYTHROCYTE [DISTWIDTH] IN BLOOD BY AUTOMATED COUNT: 15.8 % (ref 11.8–14.4)
GFR SERPL CREATININE-BSD FRML MDRD: >60 ML/MIN/1.73M2
GLUCOSE BLD-MCNC: 243 MG/DL (ref 75–110)
GLUCOSE BLD-MCNC: 89 MG/DL (ref 75–110)
GLUCOSE SERPL-MCNC: 101 MG/DL (ref 70–99)
HCT VFR BLD AUTO: 40 % (ref 40.7–50.3)
HDLC SERPL-MCNC: 42 MG/DL
HGB BLD-MCNC: 12.3 G/DL (ref 13–17)
IMM GRANULOCYTES # BLD AUTO: 0.03 K/UL (ref 0–0.3)
IMM GRANULOCYTES NFR BLD: 0 %
INR PPP: 1.7
LDLC SERPL CALC-MCNC: 155 MG/DL (ref 0–130)
LYMPHOCYTES NFR BLD: 1.31 K/UL (ref 1.1–3.7)
LYMPHOCYTES RELATIVE PERCENT: 19 % (ref 24–43)
MCH RBC QN AUTO: 29.7 PG (ref 25.2–33.5)
MCHC RBC AUTO-ENTMCNC: 30.8 G/DL (ref 28.4–34.8)
MCV RBC AUTO: 96.6 FL (ref 82.6–102.9)
MONOCYTES NFR BLD: 0.78 K/UL (ref 0.1–1.2)
MONOCYTES NFR BLD: 11 % (ref 3–12)
MYOGLOBIN SERPL-MCNC: 63 NG/ML (ref 28–72)
MYOGLOBIN SERPL-MCNC: 70 NG/ML (ref 28–72)
NEUTROPHILS NFR BLD: 66 % (ref 36–65)
NEUTS SEG NFR BLD: 4.64 K/UL (ref 1.5–8.1)
NRBC BLD-RTO: 0 PER 100 WBC
PARTIAL THROMBOPLASTIN TIME: 46.4 SEC (ref 23–36.5)
PLATELET # BLD AUTO: 177 K/UL (ref 138–453)
PMV BLD AUTO: 11.3 FL (ref 8.1–13.5)
POTASSIUM SERPL-SCNC: 4 MMOL/L (ref 3.7–5.3)
PROT SERPL-MCNC: 6.9 G/DL (ref 6.4–8.3)
PROTHROMBIN TIME: 19.4 SEC (ref 11.7–14.9)
RBC # BLD AUTO: 4.14 M/UL (ref 4.21–5.77)
RBC # BLD: ABNORMAL 10*6/UL
SODIUM SERPL-SCNC: 146 MMOL/L (ref 135–144)
TRIGL SERPL-MCNC: 119 MG/DL
TROPONIN I SERPL HS-MCNC: 328 NG/L (ref 0–22)
TROPONIN I SERPL HS-MCNC: 405 NG/L (ref 0–22)
TSH SERPL DL<=0.05 MIU/L-ACNC: 1.59 UIU/ML (ref 0.3–5)
WBC OTHER # BLD: 7 K/UL (ref 3.5–11.3)

## 2023-09-25 PROCEDURE — 80053 COMPREHEN METABOLIC PANEL: CPT

## 2023-09-25 PROCEDURE — 82947 ASSAY GLUCOSE BLOOD QUANT: CPT

## 2023-09-25 PROCEDURE — 71045 X-RAY EXAM CHEST 1 VIEW: CPT

## 2023-09-25 PROCEDURE — 85730 THROMBOPLASTIN TIME PARTIAL: CPT

## 2023-09-25 PROCEDURE — 6370000000 HC RX 637 (ALT 250 FOR IP): Performed by: NURSE PRACTITIONER

## 2023-09-25 PROCEDURE — 83036 HEMOGLOBIN GLYCOSYLATED A1C: CPT

## 2023-09-25 PROCEDURE — 6360000002 HC RX W HCPCS: Performed by: NURSE PRACTITIONER

## 2023-09-25 PROCEDURE — 1200000000 HC SEMI PRIVATE

## 2023-09-25 PROCEDURE — 85520 HEPARIN ASSAY: CPT

## 2023-09-25 PROCEDURE — 84484 ASSAY OF TROPONIN QUANT: CPT

## 2023-09-25 PROCEDURE — 99223 1ST HOSP IP/OBS HIGH 75: CPT | Performed by: FAMILY MEDICINE

## 2023-09-25 PROCEDURE — 85025 COMPLETE CBC W/AUTO DIFF WBC: CPT

## 2023-09-25 PROCEDURE — 83874 ASSAY OF MYOGLOBIN: CPT

## 2023-09-25 PROCEDURE — 80061 LIPID PANEL: CPT

## 2023-09-25 PROCEDURE — 36415 COLL VENOUS BLD VENIPUNCTURE: CPT

## 2023-09-25 PROCEDURE — 83880 ASSAY OF NATRIURETIC PEPTIDE: CPT

## 2023-09-25 PROCEDURE — 6360000002 HC RX W HCPCS: Performed by: STUDENT IN AN ORGANIZED HEALTH CARE EDUCATION/TRAINING PROGRAM

## 2023-09-25 PROCEDURE — 84443 ASSAY THYROID STIM HORMONE: CPT

## 2023-09-25 PROCEDURE — 93005 ELECTROCARDIOGRAM TRACING: CPT | Performed by: NURSE PRACTITIONER

## 2023-09-25 PROCEDURE — 85610 PROTHROMBIN TIME: CPT

## 2023-09-25 RX ORDER — INSULIN LISPRO 100 [IU]/ML
0-4 INJECTION, SOLUTION INTRAVENOUS; SUBCUTANEOUS NIGHTLY
Status: DISCONTINUED | OUTPATIENT
Start: 2023-09-25 | End: 2023-09-28 | Stop reason: HOSPADM

## 2023-09-25 RX ORDER — WARFARIN SODIUM 5 MG/1
5 TABLET ORAL
Status: DISCONTINUED | OUTPATIENT
Start: 2023-09-25 | End: 2023-09-25

## 2023-09-25 RX ORDER — SPIRONOLACTONE 25 MG/1
25 TABLET ORAL DAILY
Status: DISCONTINUED | OUTPATIENT
Start: 2023-09-25 | End: 2023-09-28 | Stop reason: HOSPADM

## 2023-09-25 RX ORDER — FUROSEMIDE 10 MG/ML
40 INJECTION INTRAMUSCULAR; INTRAVENOUS 2 TIMES DAILY
Status: DISCONTINUED | OUTPATIENT
Start: 2023-09-25 | End: 2023-09-28

## 2023-09-25 RX ORDER — HEPARIN SODIUM 1000 [USP'U]/ML
4000 INJECTION, SOLUTION INTRAVENOUS; SUBCUTANEOUS PRN
Status: DISCONTINUED | OUTPATIENT
Start: 2023-09-25 | End: 2023-09-28

## 2023-09-25 RX ORDER — ONDANSETRON 4 MG/1
4 TABLET, ORALLY DISINTEGRATING ORAL EVERY 8 HOURS PRN
Status: DISCONTINUED | OUTPATIENT
Start: 2023-09-25 | End: 2023-09-28 | Stop reason: HOSPADM

## 2023-09-25 RX ORDER — HEPARIN SODIUM 10000 [USP'U]/100ML
5-30 INJECTION, SOLUTION INTRAVENOUS CONTINUOUS
Status: DISCONTINUED | OUTPATIENT
Start: 2023-09-25 | End: 2023-09-25

## 2023-09-25 RX ORDER — ONDANSETRON 2 MG/ML
4 INJECTION INTRAMUSCULAR; INTRAVENOUS EVERY 6 HOURS PRN
Status: DISCONTINUED | OUTPATIENT
Start: 2023-09-25 | End: 2023-09-28 | Stop reason: HOSPADM

## 2023-09-25 RX ORDER — M-VIT,TX,IRON,MINS/CALC/FOLIC 27MG-0.4MG
1 TABLET ORAL DAILY
Status: DISCONTINUED | OUTPATIENT
Start: 2023-09-25 | End: 2023-09-28 | Stop reason: HOSPADM

## 2023-09-25 RX ORDER — INSULIN LISPRO 100 [IU]/ML
0-4 INJECTION, SOLUTION INTRAVENOUS; SUBCUTANEOUS
Status: DISCONTINUED | OUTPATIENT
Start: 2023-09-25 | End: 2023-09-28 | Stop reason: HOSPADM

## 2023-09-25 RX ORDER — HEPARIN SODIUM 1000 [USP'U]/ML
2000 INJECTION, SOLUTION INTRAVENOUS; SUBCUTANEOUS PRN
Status: DISCONTINUED | OUTPATIENT
Start: 2023-09-25 | End: 2023-09-25

## 2023-09-25 RX ORDER — HEPARIN SODIUM 1000 [USP'U]/ML
4000 INJECTION, SOLUTION INTRAVENOUS; SUBCUTANEOUS ONCE
Status: COMPLETED | OUTPATIENT
Start: 2023-09-25 | End: 2023-09-25

## 2023-09-25 RX ORDER — ACETAMINOPHEN 325 MG/1
650 TABLET ORAL EVERY 6 HOURS PRN
Status: DISCONTINUED | OUTPATIENT
Start: 2023-09-25 | End: 2023-09-28 | Stop reason: HOSPADM

## 2023-09-25 RX ORDER — MAGNESIUM SULFATE 1 G/100ML
1000 INJECTION INTRAVENOUS PRN
Status: DISCONTINUED | OUTPATIENT
Start: 2023-09-25 | End: 2023-09-28 | Stop reason: HOSPADM

## 2023-09-25 RX ORDER — DEXTROSE MONOHYDRATE 100 MG/ML
INJECTION, SOLUTION INTRAVENOUS CONTINUOUS PRN
Status: DISCONTINUED | OUTPATIENT
Start: 2023-09-25 | End: 2023-09-28 | Stop reason: HOSPADM

## 2023-09-25 RX ORDER — POTASSIUM CHLORIDE 20 MEQ/1
40 TABLET, EXTENDED RELEASE ORAL PRN
Status: DISCONTINUED | OUTPATIENT
Start: 2023-09-25 | End: 2023-09-28 | Stop reason: HOSPADM

## 2023-09-25 RX ORDER — HEPARIN SODIUM 1000 [USP'U]/ML
4000 INJECTION, SOLUTION INTRAVENOUS; SUBCUTANEOUS ONCE
Status: DISCONTINUED | OUTPATIENT
Start: 2023-09-25 | End: 2023-09-25

## 2023-09-25 RX ORDER — HEPARIN SODIUM 1000 [USP'U]/ML
4000 INJECTION, SOLUTION INTRAVENOUS; SUBCUTANEOUS PRN
Status: DISCONTINUED | OUTPATIENT
Start: 2023-09-25 | End: 2023-09-25

## 2023-09-25 RX ORDER — WARFARIN SODIUM 5 MG/1
5 TABLET ORAL SEE ADMIN INSTRUCTIONS
Status: DISCONTINUED | OUTPATIENT
Start: 2023-09-25 | End: 2023-09-25

## 2023-09-25 RX ORDER — SODIUM CHLORIDE 0.9 % (FLUSH) 0.9 %
5-40 SYRINGE (ML) INJECTION EVERY 12 HOURS SCHEDULED
Status: DISCONTINUED | OUTPATIENT
Start: 2023-09-25 | End: 2023-09-28 | Stop reason: HOSPADM

## 2023-09-25 RX ORDER — POLYETHYLENE GLYCOL 3350 17 G/17G
17 POWDER, FOR SOLUTION ORAL DAILY PRN
Status: DISCONTINUED | OUTPATIENT
Start: 2023-09-25 | End: 2023-09-28 | Stop reason: HOSPADM

## 2023-09-25 RX ORDER — SODIUM CHLORIDE 9 MG/ML
INJECTION, SOLUTION INTRAVENOUS PRN
Status: DISCONTINUED | OUTPATIENT
Start: 2023-09-25 | End: 2023-09-28 | Stop reason: HOSPADM

## 2023-09-25 RX ORDER — HEPARIN SODIUM 1000 [USP'U]/ML
2000 INJECTION, SOLUTION INTRAVENOUS; SUBCUTANEOUS PRN
Status: DISCONTINUED | OUTPATIENT
Start: 2023-09-25 | End: 2023-09-28

## 2023-09-25 RX ORDER — GLIPIZIDE 10 MG/1
10 TABLET ORAL
Status: DISCONTINUED | OUTPATIENT
Start: 2023-09-25 | End: 2023-09-28 | Stop reason: HOSPADM

## 2023-09-25 RX ORDER — CARVEDILOL 25 MG/1
25 TABLET ORAL 2 TIMES DAILY WITH MEALS
Status: DISCONTINUED | OUTPATIENT
Start: 2023-09-25 | End: 2023-09-28 | Stop reason: HOSPADM

## 2023-09-25 RX ORDER — HEPARIN SODIUM 10000 [USP'U]/100ML
5-30 INJECTION, SOLUTION INTRAVENOUS CONTINUOUS
Status: DISCONTINUED | OUTPATIENT
Start: 2023-09-25 | End: 2023-09-27

## 2023-09-25 RX ORDER — POTASSIUM CHLORIDE 7.45 MG/ML
10 INJECTION INTRAVENOUS PRN
Status: DISCONTINUED | OUTPATIENT
Start: 2023-09-25 | End: 2023-09-28 | Stop reason: HOSPADM

## 2023-09-25 RX ORDER — ASPIRIN 81 MG/1
81 TABLET, CHEWABLE ORAL DAILY
Status: DISCONTINUED | OUTPATIENT
Start: 2023-09-25 | End: 2023-09-28

## 2023-09-25 RX ORDER — ACETAMINOPHEN 650 MG/1
650 SUPPOSITORY RECTAL EVERY 6 HOURS PRN
Status: DISCONTINUED | OUTPATIENT
Start: 2023-09-25 | End: 2023-09-28 | Stop reason: HOSPADM

## 2023-09-25 RX ORDER — SODIUM CHLORIDE 0.9 % (FLUSH) 0.9 %
10 SYRINGE (ML) INJECTION PRN
Status: DISCONTINUED | OUTPATIENT
Start: 2023-09-25 | End: 2023-09-28 | Stop reason: HOSPADM

## 2023-09-25 RX ADMIN — CARVEDILOL 25 MG: 25 TABLET, FILM COATED ORAL at 16:05

## 2023-09-25 RX ADMIN — HEPARIN SODIUM 4000 UNITS: 1000 INJECTION INTRAVENOUS; SUBCUTANEOUS at 16:47

## 2023-09-25 RX ADMIN — SACUBITRIL AND VALSARTAN 1 TABLET: 24; 26 TABLET, FILM COATED ORAL at 21:07

## 2023-09-25 RX ADMIN — FUROSEMIDE 40 MG: 10 INJECTION, SOLUTION INTRAMUSCULAR; INTRAVENOUS at 16:05

## 2023-09-25 RX ADMIN — GLIPIZIDE 10 MG: 10 TABLET ORAL at 16:05

## 2023-09-25 RX ADMIN — HEPARIN SODIUM 12 UNITS/KG/HR: 10000 INJECTION, SOLUTION INTRAVENOUS at 16:48

## 2023-09-25 ASSESSMENT — ENCOUNTER SYMPTOMS
SHORTNESS OF BREATH: 1
BACK PAIN: 0
SINUS PRESSURE: 0
DIARRHEA: 0
COUGH: 0
VOICE CHANGE: 0
CHEST TIGHTNESS: 0
RHINORRHEA: 0
WHEEZING: 0
NAUSEA: 0
ABDOMINAL PAIN: 0
CONSTIPATION: 0
CHOKING: 0
VOMITING: 0

## 2023-09-25 NOTE — H&P
[facility]  Office: [phone]  Deanna Pacheco, DO, Gil Alfredo, DO, Sheri Desai Blood, DO, Steve Chauhan MD, Aye Obregon MD, Clark Vera MD, Sakina Crooks MD,  Johana Duong MD, Aubrey Scott MD, Rahel Barraza DO, Kevon Pandey MD,  Samuel Krishnan MD, Russell Chou MD, Raymond Torres DO, Laina Noguera MD,  Familia Chavira MD, Galilea John MD, William Rodrigues MD, Jimbo Brody MD,  Diana Estrada MD, Lj Gutierrez MD, Ankush Brice MD, Esvin East MD, Tj Engel DO, Eliza Beal MD,  Zion Garces MD, Zaid Lamb MD, Justin Andrews, CNP,  Kim Gonzalez, CNP,, Priyanka Moon, CNP,  Reid Grimm, [facility], Sally Bucio, CNP, Poornima Steele, CNP, Jean Swanson, CNP, Maryam Beal, CNP, Mellissa Moore, CNP, Pippa Hooper, CNP, Morris Cruz, CNS, Sandra Andrade, CNP, [facility]      HISTORY AND PHYSICAL EXAMINATION            Date:   9/25/2023  Patient name:  Gabby Wren  Date of admission:  9/25/2023  1:54 PM  MRN:   [id number]  Account:  [de-identified]  YOB: 1945  PCP:    HUGO Hall CNP  Room:   2009/2009-01  Code Status:    Full Code    Chief Complaint:     Breathing difficulty    History Obtained From:     patient, family member -daughter, spouse, electronic medical record    History of Present Illness: The patient is a 78 y.o. Unavailable / unknown male who presents with No chief complaint on file. and he is admitted to the hospital for the management of  Acute on chronic combined systolic and diastolic CHF (congestive heart failure) ([address] [facility] [place] [facility] [place] where he presented with difficulty breathing. Patient reported that he has been having progressively worsening low he is breathing for more than 2 weeks. Symptoms were severe yesterday and he could not sleep all night.   He went

## 2023-09-25 NOTE — PLAN OF CARE
Problem: Chronic Conditions and Co-morbidities  Goal: Patient's chronic conditions and co-morbidity symptoms are monitored and maintained or improved  Outcome: Progressing  Flowsheets (Taken 9/25/2023 1354)  Care Plan - Patient's Chronic Conditions and Co-Morbidity Symptoms are Monitored and Maintained or Improved:   Monitor and assess patient's chronic conditions and comorbid symptoms for stability, deterioration, or improvement   Collaborate with multidisciplinary team to address chronic and comorbid conditions and prevent exacerbation or deterioration   Update acute care plan with appropriate goals if chronic or comorbid symptoms are exacerbated and prevent overall improvement and discharge     Problem: Discharge Planning  Goal: Discharge to home or other facility with appropriate resources  Outcome: Progressing  Flowsheets (Taken 9/25/2023 1352)  Discharge to home or other facility with appropriate resources:   Identify barriers to discharge with patient and caregiver   Arrange for needed discharge resources and transportation as appropriate     Problem: ABCDS Injury Assessment  Goal: Absence of physical injury  Outcome: Progressing     Problem: Safety - Adult  Goal: Free from fall injury  Outcome: Progressing

## 2023-09-25 NOTE — PROGRESS NOTES
Pharmacy Note  Warfarin Consult    Latosha Pang is a 78 y.o. male for whom pharmacy has been consulted to manage warfarin therapy. Consulting Physician: Sammi Rader  Reason for Admission: CHF    Warfarin dose prior to admission: 5mg Mon and Thurs, 7.5mg all other days  Warfarin indication: afib  Target INR range: 2-3     Past Medical History:   Diagnosis Date    Chronic combined systolic and diastolic HF (heart failure) ([address])     Diabetes ([address])     H/O aortic valve replacement     Hyperlipidemia     Hypertension     Kidney stones     Longstanding persistent atrial fibrillation (HCC)     Moderate pulmonary hypertension ([address])                 Recent Labs     09/25/23  1427   INR 1.7     Recent Labs     09/25/23  1427   HGB 12.3*   HCT 40.0*          Current warfarin drug-drug interactions: n/a      Date             INR        Dose   9/25/2023            1.7          Will give 5mg today. Daily PT/INR while inpatient. PT/INR ordered to start 9/26. Thank you for the consult. Will continue to follow.     Akhil Betts, PharmD  9/25/2023  3:16 PM

## 2023-09-25 NOTE — CARE COORDINATION
Case Management Assessment  Initial Evaluation    Date/Time of Evaluation: 9/25/2023 6:12 PM  Assessment Completed by: Sammie Gomez RN    If patient is discharged prior to next notation, then this note serves as note for discharge by case management. Patient Name: Leon Bender                   YOB: 1945  Diagnosis: CHF (congestive heart failure), NYHA class I, unspecified failure chronicity, unspecified type [facility]) [I50.9]                   Date / Time: 9/25/2023  1:54 PM    Patient Admission Status: Inpatient   Readmission Risk (Low < 19, Mod (19-27), High > 27): No data recorded  Current PCP: Julius Burkitt, APRN - CNP  PCP verified by CM? (P) Yes    Chart Reviewed: Yes      History Provided by: (P) Patient  Patient Orientation: (P) Alert and Oriented    Patient Cognition: (P) Alert    Hospitalization in the last 30 days (Readmission):  No    If yes, Readmission Assessment in  Navigator will be completed. Advance Directives:      Code Status: Full Code   Patient's Primary Decision Maker is: (P) Legal Next of Kin    Primary Decision Maker: ClaudineMeeta lawson - Spouse - [phone]    Secondary Decision Maker: Leeann Stone - Unknown - [phone]    Discharge Planning:    Patient lives with: (P) Spouse/Significant Other Type of Home: (P) Apartment  Primary Care Giver: (P) Self  Patient Support Systems include: (P) Spouse/Significant Other, Children   Current Financial resources: (P) Medicare  Current community resources:    Current services prior to admission: (P) None            Current DME:              Type of Home Care services:  (P) None    ADLS  Prior functional level: (P) Independent in ADLs/IADLs  Current functional level: (P) Independent in ADLs/IADLs    PT AM-PAC:   /24  OT AM-PAC:   /24    Family can provide assistance at DC: (P) Yes  Would you like Case Management to discuss the discharge plan with any other family members/significant others, and if so, who?     Plans to Return to

## 2023-09-26 PROBLEM — I21.4 NSTEMI (NON-ST ELEVATED MYOCARDIAL INFARCTION) (HCC): Status: ACTIVE | Noted: 2023-09-26

## 2023-09-26 LAB
ANION GAP SERPL CALCULATED.3IONS-SCNC: 14 MMOL/L (ref 9–17)
ANTI-XA UNFRAC HEPARIN: 0.37 IU/L
ANTI-XA UNFRAC HEPARIN: 0.45 IU/L
BUN SERPL-MCNC: 34 MG/DL (ref 8–23)
CALCIUM SERPL-MCNC: 9 MG/DL (ref 8.6–10.4)
CHLORIDE SERPL-SCNC: 106 MMOL/L (ref 98–107)
CO2 SERPL-SCNC: 24 MMOL/L (ref 20–31)
CREAT SERPL-MCNC: 1.2 MG/DL (ref 0.7–1.2)
EKG ATRIAL RATE: 288 BPM
EKG Q-T INTERVAL: 510 MS
EKG QRS DURATION: 152 MS
EKG QTC CALCULATION (BAZETT): 496 MS
EKG R AXIS: -18 DEGREES
EKG T AXIS: -170 DEGREES
EKG VENTRICULAR RATE: 57 BPM
ERYTHROCYTE [DISTWIDTH] IN BLOOD BY AUTOMATED COUNT: 15.5 % (ref 11.8–14.4)
EST. AVERAGE GLUCOSE BLD GHB EST-MCNC: 126 MG/DL
GFR SERPL CREATININE-BSD FRML MDRD: >60 ML/MIN/1.73M2
GLUCOSE BLD-MCNC: 105 MG/DL (ref 75–110)
GLUCOSE BLD-MCNC: 124 MG/DL (ref 75–110)
GLUCOSE BLD-MCNC: 131 MG/DL (ref 75–110)
GLUCOSE BLD-MCNC: 164 MG/DL (ref 75–110)
GLUCOSE SERPL-MCNC: 95 MG/DL (ref 70–99)
HBA1C MFR BLD: 6 % (ref 4–6)
HCT VFR BLD AUTO: 37.8 % (ref 40.7–50.3)
HGB BLD-MCNC: 11.8 G/DL (ref 13–17)
INR PPP: 1.6
MCH RBC QN AUTO: 30.2 PG (ref 25.2–33.5)
MCHC RBC AUTO-ENTMCNC: 31.2 G/DL (ref 28.4–34.8)
MCV RBC AUTO: 96.7 FL (ref 82.6–102.9)
NRBC BLD-RTO: 0 PER 100 WBC
PLATELET # BLD AUTO: 171 K/UL (ref 138–453)
PMV BLD AUTO: 11.4 FL (ref 8.1–13.5)
POTASSIUM SERPL-SCNC: 3.7 MMOL/L (ref 3.7–5.3)
PROTHROMBIN TIME: 19.1 SEC (ref 11.7–14.9)
RBC # BLD AUTO: 3.91 M/UL (ref 4.21–5.77)
SODIUM SERPL-SCNC: 144 MMOL/L (ref 135–144)
WBC OTHER # BLD: 6.9 K/UL (ref 3.5–11.3)

## 2023-09-26 PROCEDURE — 1200000000 HC SEMI PRIVATE

## 2023-09-26 PROCEDURE — 99223 1ST HOSP IP/OBS HIGH 75: CPT | Performed by: INTERNAL MEDICINE

## 2023-09-26 PROCEDURE — 6370000000 HC RX 637 (ALT 250 FOR IP): Performed by: STUDENT IN AN ORGANIZED HEALTH CARE EDUCATION/TRAINING PROGRAM

## 2023-09-26 PROCEDURE — 6360000002 HC RX W HCPCS: Performed by: NURSE PRACTITIONER

## 2023-09-26 PROCEDURE — 97165 OT EVAL LOW COMPLEX 30 MIN: CPT

## 2023-09-26 PROCEDURE — 6370000000 HC RX 637 (ALT 250 FOR IP): Performed by: NURSE PRACTITIONER

## 2023-09-26 PROCEDURE — 6360000002 HC RX W HCPCS: Performed by: STUDENT IN AN ORGANIZED HEALTH CARE EDUCATION/TRAINING PROGRAM

## 2023-09-26 PROCEDURE — 85520 HEPARIN ASSAY: CPT

## 2023-09-26 PROCEDURE — 93010 ELECTROCARDIOGRAM REPORT: CPT | Performed by: INTERNAL MEDICINE

## 2023-09-26 PROCEDURE — 85610 PROTHROMBIN TIME: CPT

## 2023-09-26 PROCEDURE — 36415 COLL VENOUS BLD VENIPUNCTURE: CPT

## 2023-09-26 PROCEDURE — 80048 BASIC METABOLIC PNL TOTAL CA: CPT

## 2023-09-26 PROCEDURE — 82947 ASSAY GLUCOSE BLOOD QUANT: CPT

## 2023-09-26 PROCEDURE — 99232 SBSQ HOSP IP/OBS MODERATE 35: CPT | Performed by: STUDENT IN AN ORGANIZED HEALTH CARE EDUCATION/TRAINING PROGRAM

## 2023-09-26 PROCEDURE — 97535 SELF CARE MNGMENT TRAINING: CPT

## 2023-09-26 PROCEDURE — 85027 COMPLETE CBC AUTOMATED: CPT

## 2023-09-26 RX ORDER — ATORVASTATIN CALCIUM 40 MG/1
40 TABLET, FILM COATED ORAL NIGHTLY
Status: DISCONTINUED | OUTPATIENT
Start: 2023-09-26 | End: 2023-09-28 | Stop reason: HOSPADM

## 2023-09-26 RX ADMIN — FUROSEMIDE 40 MG: 10 INJECTION, SOLUTION INTRAMUSCULAR; INTRAVENOUS at 08:33

## 2023-09-26 RX ADMIN — SPIRONOLACTONE 25 MG: 25 TABLET ORAL at 08:33

## 2023-09-26 RX ADMIN — HEPARIN SODIUM 14 UNITS/KG/HR: 10000 INJECTION, SOLUTION INTRAVENOUS at 14:33

## 2023-09-26 RX ADMIN — FUROSEMIDE 40 MG: 10 INJECTION, SOLUTION INTRAMUSCULAR; INTRAVENOUS at 17:06

## 2023-09-26 RX ADMIN — SACUBITRIL AND VALSARTAN 1 TABLET: 24; 26 TABLET, FILM COATED ORAL at 19:58

## 2023-09-26 RX ADMIN — ASPIRIN 81 MG: 81 TABLET, CHEWABLE ORAL at 08:33

## 2023-09-26 RX ADMIN — CARVEDILOL 25 MG: 25 TABLET, FILM COATED ORAL at 08:33

## 2023-09-26 RX ADMIN — EMPAGLIFLOZIN 10 MG: 10 TABLET, FILM COATED ORAL at 08:33

## 2023-09-26 RX ADMIN — SACUBITRIL AND VALSARTAN 1 TABLET: 24; 26 TABLET, FILM COATED ORAL at 08:34

## 2023-09-26 RX ADMIN — GLIPIZIDE 10 MG: 10 TABLET ORAL at 06:49

## 2023-09-26 RX ADMIN — ATORVASTATIN CALCIUM 40 MG: 40 TABLET, FILM COATED ORAL at 19:58

## 2023-09-26 RX ADMIN — HEPARIN SODIUM 2000 UNITS: 1000 INJECTION INTRAVENOUS; SUBCUTANEOUS at 00:18

## 2023-09-26 RX ADMIN — CARVEDILOL 25 MG: 25 TABLET, FILM COATED ORAL at 17:06

## 2023-09-26 RX ADMIN — GLIPIZIDE 10 MG: 10 TABLET ORAL at 16:11

## 2023-09-26 ASSESSMENT — PAIN SCALES - GENERAL: PAINLEVEL_OUTOF10: 0

## 2023-09-26 NOTE — PROGRESS NOTES
Occupational Therapy  Facility/Department: [facility] CAR 2- STEPDOWN  Occupational Therapy Initial Assessment    Name: Miah Kumar  : 1945  MRN: [id number]  Date of Service: 2023    Discharge Recommendations: No therapy recommended at discharge. Equipment recommendations listed below are based on what the patient would need if they were able to return to prior living arrangements at the time of discharge. OT Equipment Recommendations  Equipment Needed: Yes  Mobility Devices: ADL Assistive Devices  ADL Assistive Devices: Transfer Tub Bench; Toileting - Raised Toilet Seat with arms     Patient Diagnosis(es): The primary encounter diagnosis was Chronic combined systolic and diastolic congestive heart failure ([address]). A diagnosis of Unstable angina (HCC) was also pertinent to this visit. Past Medical History:  has a past medical history of Chronic combined systolic and diastolic HF (heart failure) ([address]), Diabetes ([address]), H/O aortic valve replacement, Hyperlipidemia, Hypertension, Kidney stones, Longstanding persistent atrial fibrillation ([address]), and Moderate pulmonary hypertension ([address]). Past Surgical History:  has a past surgical history that includes Cardiac valve replacement (2008) and Colon surgery. Assessment   Performance deficits / Impairments: Decreased functional mobility ; Decreased endurance;Decreased ADL status; Decreased high-level IADLs  Prognosis: Good  Decision Making: Low Complexity  REQUIRES OT FOLLOW-UP: Yes  Activity Tolerance  Activity Tolerance: Patient Tolerated treatment well;Patient limited by fatigue        Plan   Occupational Therapy Plan  Times Per Week: 2-3x     Restrictions  Restrictions/Precautions  Restrictions/Precautions: General Precautions  Required Braces or Orthoses?: No  Position Activity Restriction  Other position/activity restrictions: up w/ A, room air    Subjective   General  Patient assessed for rehabilitation services?: Yes  Family / Caregiver Present:  Yes (Wife, dtr)  Diagnosis: acute CHF, A-fib, SOB, DM2, NSTEMI  Subjective  Subjective: RN approved therapy session, pt in chair upon arrival, pt states 0/10 pain level     Social/Functional History  Social/Functional History  Lives With: Spouse  Type of Home: Apartment  Home Layout: One level  Home Access: Level entry  Bathroom Shower/Tub: Tub/Shower unit  Bathroom Toilet: Standard  Bathroom Equipment: Grab bars in shower  Home Equipment: [employer], rolling (not using)  ADL Assistance: Independent  Homemaking Assistance: Independent  Homemaking Responsibilities: Yes (Shares with spouse)  Ambulation Assistance: Independent  Transfer Assistance: Independent  Active : Yes  Mode of Transportation: Safe N Clear  Occupation: Full time employment  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  Additional Comments: Wife also still working       Objective   SpO2: 96 %  O2 Device: None (Room air)             Safety Devices  Type of Devices: Left in chair;Call light within reach;Gait belt  Restraints  Restraints Initially in Place: No  Bed Mobility Training  Bed Mobility Training: No (YADIRA as pt in recliner upon arrival/exit this date)  Scooting: Independent  Balance  Sitting:  (Pt sat unsupported in chair for 5 min total this date, mod I)  Standing:  (Pt stood chairside/sinkside/at toilet for total of 10 min, SUP)  Transfer Training  Transfer Training: Yes  Sit to Stand: Independent  Stand to Sit: Independent  Gait  Overall Level of Assistance: Supervision  Speed/Dian: Slow (Func mob around room and to/from bathroom)  Assistive Device: Gait belt     AROM: Within functional limits  PROM: Within functional limits  Strength:  Within functional limits (5/5 grossly at BUE)  Coordination: Within functional limits  Tone: Normal  Sensation: Intact  ADL  Feeding: Independent  Grooming: Independent  UE Bathing: Modified independent   LE Bathing: Modified independent   UE Dressing: Modified independent   LE Dressing:

## 2023-09-26 NOTE — CONSULTS
[facility] Cardiology Cardiology    Consult / H&P               Today's Date: 9/26/2023  Patient Name: Damion Amato  Date of admission: 9/25/2023  1:54 PM  Patient's age: 78 y.o., 1945  Admission Dx: CHF (congestive heart failure), NYHA class I, unspecified failure chronicity, unspecified type ([address]) [I50.9]    Requesting Physician: No admitting provider for patient encounter. Cardiac Evaluation Reason:  RBBB, CHF exacerbation, and elevated troponin    History Obtained From: patient and chart review     History of Present Illness: This patient 78y.o. years old with past medical history CHF, DM, HLD, aortic valve replacement around 17 years ago, HTN, Atrial fibrillation, and moderate pulmonary HTN who was transferred from [facility] with difficulty breathing that has gotten progressively worse over the past 3-4 days. Pt states that he gained over 7 pounds and has been feeling much better since being in the hospital. Pt denies chest pain and heart palpitations. Pt sees Dr. Anamika Vázquez outpatient for cardiology. Past Medical History:   has a past medical history of Chronic combined systolic and diastolic HF (heart failure) ([address]), Diabetes ([address]), H/O aortic valve replacement, Hyperlipidemia, Hypertension, Kidney stones, Longstanding persistent atrial fibrillation ([address]), and Moderate pulmonary hypertension ([address]). Past Surgical History:   has a past surgical history that includes Cardiac valve replacement (02/29/2008) and Colon surgery. Home Medications:    Prior to Admission medications    Medication Sig Start Date End Date Taking?  Authorizing Provider   Cholecalciferol (D3 ADULT PO) Take by mouth   Yes Historical Provider, MD   CINNAMON PO Take by mouth   Yes Historical Provider, MD   sacubitril-valsartan (ENTRESTO) 24-26 MG per tablet Take 1 tablet by mouth 2 times daily    Historical Provider, MD   empagliflozin (JARDIANCE) 10 MG tablet Take 1 tablet by mouth daily 9/15/23   HUGO Romero - extremity edema, responding to IV Lasix. ECG shows atrial flutter with controlled ventricular response and underlying left bundle branch block. High-sensitivity troponins elevated with an upward trend suggesting NSTEMI/acute coronary syndrome. Hold warfarin and continue heparin drip  IV Lasix 40 mg twice daily  Resume aspirin and statin  Echocardiogram to assess aortic valve and LVEF  Coronary angiography/left heart catheter tomorrow  N.p.o. from midnight    I discussed in detail the risks, benefits, and alternatives to the procedure including but not limited to risk of bleeding/hematoma requiring surgical intervention, contrast induced allergy and/or nephropathy, arrythmia, CVA, MI or death. The patient verbalized understanding and wishes to proceed.

## 2023-09-26 NOTE — PLAN OF CARE
Problem: Chronic Conditions and Co-morbidities  Goal: Patient's chronic conditions and co-morbidity symptoms are monitored and maintained or improved  9/26/2023 0140 by Jb Leavitt RN  Outcome: Progressing  9/25/2023 1517 by Surinder Kumar RN  Outcome: Progressing  Flowsheets (Taken 9/25/2023 1352)  Care Plan - Patient's Chronic Conditions and Co-Morbidity Symptoms are Monitored and Maintained or Improved:   Monitor and assess patient's chronic conditions and comorbid symptoms for stability, deterioration, or improvement   Collaborate with multidisciplinary team to address chronic and comorbid conditions and prevent exacerbation or deterioration   Update acute care plan with appropriate goals if chronic or comorbid symptoms are exacerbated and prevent overall improvement and discharge     Problem: Discharge Planning  Goal: Discharge to home or other facility with appropriate resources  9/26/2023 0140 by Jb Leavitt RN  Outcome: Progressing  9/25/2023 1517 by Surinder Kumar RN  Outcome: Progressing  Flowsheets (Taken 9/25/2023 1352)  Discharge to home or other facility with appropriate resources:   Identify barriers to discharge with patient and caregiver   Arrange for needed discharge resources and transportation as appropriate     Problem: ABCDS Injury Assessment  Goal: Absence of physical injury  9/26/2023 0140 by Jb Leavitt RN  Outcome: Progressing  9/25/2023 1517 by Surinder Kumar RN  Outcome: Progressing     Problem: Safety - Adult  Goal: Free from fall injury  9/26/2023 0140 by Jb Leavitt RN  Outcome: Progressing  9/25/2023 1517 by Surinder Kumar RN  Outcome: Progressing

## 2023-09-26 NOTE — PROGRESS NOTES
Physician Progress Note      Maia Davis  CSN #:                  [id number]  :                       1945  ADMIT DATE:       2023 1:54 PM  [facility] DATE:  RESPONDING  PROVIDER #:        Isabella Cochran MD          QUERY TEXT:    Patient admitted with acute CHF, noted to have longstanding persistent atrial   fibrillation and is maintained on Coumadin. If possible, please document in   progress notes and discharge summary if you are evaluating and/or treating any   of the following: The medical record reflects the following:  Risk Factors: HTN with CHF, longstanding persistent atrial fibrillation, DM 2,   age  Clinical Indicators: EKG- noted as atrial flutter with L BBB,  telemetry shows   - atrial fibrillation with HR - 60-78. Treatment: PO- Coumadin, Aldactone, Coreg. Thank you, please contact me for any questions. Brad Diaz RN, [facility]  cell- [phone]  office hours - [phone]  Options provided:  -- Secondary hypercoagulable state in a patient with atrial fibrillation  -- Other - I will add my own diagnosis  -- Disagree - Not applicable / Not valid  -- Disagree - Clinically unable to determine / Unknown  -- Refer to Clinical Documentation Reviewer    PROVIDER RESPONSE TEXT:    This patient has secondary hypercoagulable state in a patient with atrial   fibrillation.     Query created by: Brad Diaz on 2023 1:35 PM      Electronically signed by:  Isabella Cochran MD 2023 1:55 PM

## 2023-09-27 ENCOUNTER — APPOINTMENT (OUTPATIENT)
Age: 78
End: 2023-09-27
Attending: FAMILY MEDICINE
Payer: MEDICARE

## 2023-09-27 PROBLEM — I25.10 CAD S/P PERCUTANEOUS CORONARY ANGIOPLASTY: Status: ACTIVE | Noted: 2023-09-27

## 2023-09-27 PROBLEM — Z98.61 CAD S/P PERCUTANEOUS CORONARY ANGIOPLASTY: Status: ACTIVE | Noted: 2023-09-27

## 2023-09-27 LAB
ACT BLD: 226 SEC (ref 79–149)
ACT BLD: 264 SEC (ref 79–149)
ANION GAP SERPL CALCULATED.3IONS-SCNC: 12 MMOL/L (ref 9–17)
ANTI-XA UNFRAC HEPARIN: 0.36 IU/L
BUN SERPL-MCNC: 33 MG/DL (ref 8–23)
CALCIUM SERPL-MCNC: 9 MG/DL (ref 8.6–10.4)
CHLORIDE SERPL-SCNC: 102 MMOL/L (ref 98–107)
CO2 SERPL-SCNC: 25 MMOL/L (ref 20–31)
CREAT SERPL-MCNC: 1.3 MG/DL (ref 0.7–1.2)
ECHO AO ASC DIAM: 3.9 CM
ECHO AO ASCENDING AORTA INDEX: 1.97 CM/M2
ECHO AO ROOT DIAM: 3.5 CM
ECHO AO ROOT INDEX: 1.77 CM/M2
ECHO AV AREA PEAK VELOCITY: 3 CM2
ECHO AV AREA VTI: 2.9 CM2
ECHO AV AREA/BSA PEAK VELOCITY: 1.5 CM2/M2
ECHO AV AREA/BSA VTI: 1.5 CM2/M2
ECHO AV MEAN GRADIENT: 3 MMHG
ECHO AV MEAN VELOCITY: 0.8 M/S
ECHO AV PEAK GRADIENT: 7 MMHG
ECHO AV PEAK VELOCITY: 1.3 M/S
ECHO AV VELOCITY RATIO: 0.54
ECHO AV VTI: 20.7 CM
ECHO BSA: 1.99 M2
ECHO EST RA PRESSURE: 3 MMHG
ECHO LA AREA 2C: 23.9 CM2
ECHO LA AREA 4C: 24 CM2
ECHO LA DIAMETER INDEX: 2.58 CM/M2
ECHO LA DIAMETER: 5.1 CM
ECHO LA MAJOR AXIS: 6.1 CM
ECHO LA MINOR AXIS: 6 CM
ECHO LA TO AORTIC ROOT RATIO: 1.46
ECHO LA VOL 2C: 77 ML (ref 18–58)
ECHO LA VOL 4C: 74 ML (ref 18–58)
ECHO LA VOL BP: 76 ML (ref 18–58)
ECHO LA VOL/BSA BIPLANE: 38 ML/M2 (ref 16–34)
ECHO LA VOLUME INDEX A2C: 39 ML/M2 (ref 16–34)
ECHO LA VOLUME INDEX A4C: 37 ML/M2 (ref 16–34)
ECHO LV FRACTIONAL SHORTENING: 7 % (ref 28–44)
ECHO LV INTERNAL DIMENSION DIASTOLE INDEX: 3.03 CM/M2
ECHO LV INTERNAL DIMENSION DIASTOLIC: 6 CM (ref 4.2–5.9)
ECHO LV INTERNAL DIMENSION SYSTOLIC INDEX: 2.83 CM/M2
ECHO LV INTERNAL DIMENSION SYSTOLIC: 5.6 CM
ECHO LV IVSD: 1.5 CM (ref 0.6–1)
ECHO LV MASS 2D: 350.1 G (ref 88–224)
ECHO LV MASS INDEX 2D: 176.8 G/M2 (ref 49–115)
ECHO LV POSTERIOR WALL DIASTOLIC: 1.1 CM (ref 0.6–1)
ECHO LV RELATIVE WALL THICKNESS RATIO: 0.37
ECHO LVOT AREA: 5.7 CM2
ECHO LVOT AV VTI INDEX: 0.5
ECHO LVOT DIAM: 2.7 CM
ECHO LVOT MEAN GRADIENT: 1 MMHG
ECHO LVOT PEAK GRADIENT: 2 MMHG
ECHO LVOT PEAK VELOCITY: 0.7 M/S
ECHO LVOT STROKE VOLUME INDEX: 30.1 ML/M2
ECHO LVOT SV: 59.5 ML
ECHO LVOT VTI: 10.4 CM
ECHO MV AREA VTI: 2.7 CM2
ECHO MV LVOT VTI INDEX: 2.11
ECHO MV MAX VELOCITY: 0.8 M/S
ECHO MV MEAN GRADIENT: 1 MMHG
ECHO MV MEAN VELOCITY: 0.4 M/S
ECHO MV PEAK GRADIENT: 2 MMHG
ECHO MV VTI: 21.9 CM
ECHO PV MAX VELOCITY: 0.5 M/S
ECHO PV PEAK GRADIENT: 1 MMHG
ECHO RIGHT VENTRICULAR SYSTOLIC PRESSURE (RVSP): 28 MMHG
ECHO RV INTERNAL DIMENSION: 4.3 CM
ECHO RV TAPSE: 2 CM (ref 1.7–?)
ECHO TV REGURGITANT MAX VELOCITY: 2.49 M/S
ECHO TV REGURGITANT PEAK GRADIENT: 25 MMHG
ERYTHROCYTE [DISTWIDTH] IN BLOOD BY AUTOMATED COUNT: 15.4 % (ref 11.8–14.4)
GFR SERPL CREATININE-BSD FRML MDRD: 56 ML/MIN/1.73M2
GLUCOSE BLD-MCNC: 102 MG/DL (ref 75–110)
GLUCOSE BLD-MCNC: 110 MG/DL (ref 75–110)
GLUCOSE BLD-MCNC: 161 MG/DL (ref 75–110)
GLUCOSE BLD-MCNC: 202 MG/DL (ref 75–110)
GLUCOSE SERPL-MCNC: 100 MG/DL (ref 70–99)
HCT VFR BLD AUTO: 36.7 % (ref 40.7–50.3)
HGB BLD-MCNC: 11.5 G/DL (ref 13–17)
INR PPP: 1.4
MCH RBC QN AUTO: 29.7 PG (ref 25.2–33.5)
MCHC RBC AUTO-ENTMCNC: 31.3 G/DL (ref 28.4–34.8)
MCV RBC AUTO: 94.8 FL (ref 82.6–102.9)
NRBC BLD-RTO: 0 PER 100 WBC
PLATELET # BLD AUTO: 167 K/UL (ref 138–453)
PMV BLD AUTO: 12 FL (ref 8.1–13.5)
POTASSIUM SERPL-SCNC: 3.8 MMOL/L (ref 3.7–5.3)
PROTHROMBIN TIME: 16.8 SEC (ref 11.7–14.9)
RBC # BLD AUTO: 3.87 M/UL (ref 4.21–5.77)
SODIUM SERPL-SCNC: 139 MMOL/L (ref 135–144)
TROPONIN I SERPL HS-MCNC: 466 NG/L (ref 0–22)
WBC OTHER # BLD: 6.9 K/UL (ref 3.5–11.3)

## 2023-09-27 PROCEDURE — 6360000002 HC RX W HCPCS: Performed by: INTERNAL MEDICINE

## 2023-09-27 PROCEDURE — 92929 PR PRQ TRLUML CORONARY STENT W/ANGIO ADDL ART/BRNCH: CPT | Performed by: INTERNAL MEDICINE

## 2023-09-27 PROCEDURE — 93452 LEFT HRT CATH W/VENTRCLGRPHY: CPT | Performed by: INTERNAL MEDICINE

## 2023-09-27 PROCEDURE — 99152 MOD SED SAME PHYS/QHP 5/>YRS: CPT | Performed by: INTERNAL MEDICINE

## 2023-09-27 PROCEDURE — 92978 ENDOLUMINL IVUS OCT C 1ST: CPT | Performed by: INTERNAL MEDICINE

## 2023-09-27 PROCEDURE — B2151ZZ FLUOROSCOPY OF LEFT HEART USING LOW OSMOLAR CONTRAST: ICD-10-PCS | Performed by: INTERNAL MEDICINE

## 2023-09-27 PROCEDURE — 85610 PROTHROMBIN TIME: CPT

## 2023-09-27 PROCEDURE — 2709999900 HC NON-CHARGEABLE SUPPLY: Performed by: INTERNAL MEDICINE

## 2023-09-27 PROCEDURE — 99153 MOD SED SAME PHYS/QHP EA: CPT | Performed by: INTERNAL MEDICINE

## 2023-09-27 PROCEDURE — 6360000004 HC RX CONTRAST MEDICATION: Performed by: INTERNAL MEDICINE

## 2023-09-27 PROCEDURE — C1769 GUIDE WIRE: HCPCS | Performed by: INTERNAL MEDICINE

## 2023-09-27 PROCEDURE — 6370000000 HC RX 637 (ALT 250 FOR IP): Performed by: NURSE PRACTITIONER

## 2023-09-27 PROCEDURE — 6370000000 HC RX 637 (ALT 250 FOR IP): Performed by: STUDENT IN AN ORGANIZED HEALTH CARE EDUCATION/TRAINING PROGRAM

## 2023-09-27 PROCEDURE — 80048 BASIC METABOLIC PNL TOTAL CA: CPT

## 2023-09-27 PROCEDURE — 94761 N-INVAS EAR/PLS OXIMETRY MLT: CPT

## 2023-09-27 PROCEDURE — 027135Z DILATION OF CORONARY ARTERY, TWO ARTERIES WITH TWO DRUG-ELUTING INTRALUMINAL DEVICES, PERCUTANEOUS APPROACH: ICD-10-PCS | Performed by: INTERNAL MEDICINE

## 2023-09-27 PROCEDURE — C9600 PERC DRUG-EL COR STENT SING: HCPCS | Performed by: INTERNAL MEDICINE

## 2023-09-27 PROCEDURE — 6370000000 HC RX 637 (ALT 250 FOR IP): Performed by: INTERNAL MEDICINE

## 2023-09-27 PROCEDURE — 92928 PRQ TCAT PLMT NTRAC ST 1 LES: CPT | Performed by: INTERNAL MEDICINE

## 2023-09-27 PROCEDURE — C1874 STENT, COATED/COV W/DEL SYS: HCPCS | Performed by: INTERNAL MEDICINE

## 2023-09-27 PROCEDURE — 2580000003 HC RX 258: Performed by: NURSE PRACTITIONER

## 2023-09-27 PROCEDURE — 4A023N7 MEASUREMENT OF CARDIAC SAMPLING AND PRESSURE, LEFT HEART, PERCUTANEOUS APPROACH: ICD-10-PCS | Performed by: INTERNAL MEDICINE

## 2023-09-27 PROCEDURE — 84484 ASSAY OF TROPONIN QUANT: CPT

## 2023-09-27 PROCEDURE — 93306 TTE W/DOPPLER COMPLETE: CPT

## 2023-09-27 PROCEDURE — 36415 COLL VENOUS BLD VENIPUNCTURE: CPT

## 2023-09-27 PROCEDURE — 85347 COAGULATION TIME ACTIVATED: CPT

## 2023-09-27 PROCEDURE — C1725 CATH, TRANSLUMIN NON-LASER: HCPCS | Performed by: INTERNAL MEDICINE

## 2023-09-27 PROCEDURE — 99232 SBSQ HOSP IP/OBS MODERATE 35: CPT | Performed by: STUDENT IN AN ORGANIZED HEALTH CARE EDUCATION/TRAINING PROGRAM

## 2023-09-27 PROCEDURE — 85520 HEPARIN ASSAY: CPT

## 2023-09-27 PROCEDURE — 1200000000 HC SEMI PRIVATE

## 2023-09-27 PROCEDURE — B2111ZZ FLUOROSCOPY OF MULTIPLE CORONARY ARTERIES USING LOW OSMOLAR CONTRAST: ICD-10-PCS | Performed by: INTERNAL MEDICINE

## 2023-09-27 PROCEDURE — 93454 CORONARY ARTERY ANGIO S&I: CPT | Performed by: INTERNAL MEDICINE

## 2023-09-27 PROCEDURE — 93306 TTE W/DOPPLER COMPLETE: CPT | Performed by: INTERNAL MEDICINE

## 2023-09-27 PROCEDURE — 6360000002 HC RX W HCPCS: Performed by: NURSE PRACTITIONER

## 2023-09-27 PROCEDURE — 2500000003 HC RX 250 WO HCPCS: Performed by: INTERNAL MEDICINE

## 2023-09-27 PROCEDURE — 85027 COMPLETE CBC AUTOMATED: CPT

## 2023-09-27 PROCEDURE — 82947 ASSAY GLUCOSE BLOOD QUANT: CPT

## 2023-09-27 DEVICE — STENT ONYXNG27518UX ONYX 2.75X18RX
Type: IMPLANTABLE DEVICE | Status: FUNCTIONAL
Brand: ONYX FRONTIER™

## 2023-09-27 DEVICE — STENT ONYXNG25038UX ONYX 2.50X38RX
Type: IMPLANTABLE DEVICE | Status: FUNCTIONAL
Brand: ONYX FRONTIER™

## 2023-09-27 DEVICE — STENT ONYXNG40018UX ONYX 4.00X18RX
Type: IMPLANTABLE DEVICE | Status: FUNCTIONAL
Brand: ONYX FRONTIER™

## 2023-09-27 DEVICE — STENT ONYXNG27526UX ONYX 2.75X26RX
Type: IMPLANTABLE DEVICE | Status: FUNCTIONAL
Brand: ONYX FRONTIER™

## 2023-09-27 RX ORDER — WARFARIN SODIUM 7.5 MG/1
7.5 TABLET ORAL
Status: COMPLETED | OUTPATIENT
Start: 2023-09-27 | End: 2023-09-27

## 2023-09-27 RX ORDER — MIDAZOLAM HYDROCHLORIDE 1 MG/ML
INJECTION INTRAMUSCULAR; INTRAVENOUS PRN
Status: DISCONTINUED | OUTPATIENT
Start: 2023-09-27 | End: 2023-09-27 | Stop reason: HOSPADM

## 2023-09-27 RX ORDER — FENTANYL CITRATE 50 UG/ML
INJECTION, SOLUTION INTRAMUSCULAR; INTRAVENOUS PRN
Status: DISCONTINUED | OUTPATIENT
Start: 2023-09-27 | End: 2023-09-27 | Stop reason: HOSPADM

## 2023-09-27 RX ORDER — NITROGLYCERIN 20 MG/100ML
INJECTION INTRAVENOUS PRN
Status: DISCONTINUED | OUTPATIENT
Start: 2023-09-27 | End: 2023-09-27 | Stop reason: HOSPADM

## 2023-09-27 RX ORDER — HEPARIN SODIUM 1000 [USP'U]/ML
INJECTION, SOLUTION INTRAVENOUS; SUBCUTANEOUS PRN
Status: DISCONTINUED | OUTPATIENT
Start: 2023-09-27 | End: 2023-09-27 | Stop reason: HOSPADM

## 2023-09-27 RX ORDER — ASPIRIN 81 MG/1
TABLET, CHEWABLE ORAL PRN
Status: DISCONTINUED | OUTPATIENT
Start: 2023-09-27 | End: 2023-09-27 | Stop reason: HOSPADM

## 2023-09-27 RX ADMIN — WARFARIN SODIUM 7.5 MG: 7.5 TABLET ORAL at 16:57

## 2023-09-27 RX ADMIN — SACUBITRIL AND VALSARTAN 1 TABLET: 24; 26 TABLET, FILM COATED ORAL at 08:30

## 2023-09-27 RX ADMIN — GLIPIZIDE 10 MG: 10 TABLET ORAL at 15:31

## 2023-09-27 RX ADMIN — CARVEDILOL 25 MG: 25 TABLET, FILM COATED ORAL at 08:30

## 2023-09-27 RX ADMIN — INSULIN LISPRO 1 UNITS: 100 INJECTION, SOLUTION INTRAVENOUS; SUBCUTANEOUS at 16:52

## 2023-09-27 RX ADMIN — TICAGRELOR 90 MG: 90 TABLET ORAL at 19:57

## 2023-09-27 RX ADMIN — Medication 1 TABLET: at 08:30

## 2023-09-27 RX ADMIN — ASPIRIN 81 MG: 81 TABLET, CHEWABLE ORAL at 08:30

## 2023-09-27 RX ADMIN — EMPAGLIFLOZIN 10 MG: 10 TABLET, FILM COATED ORAL at 08:30

## 2023-09-27 RX ADMIN — FUROSEMIDE 40 MG: 10 INJECTION, SOLUTION INTRAMUSCULAR; INTRAVENOUS at 08:30

## 2023-09-27 RX ADMIN — CARVEDILOL 25 MG: 25 TABLET, FILM COATED ORAL at 16:57

## 2023-09-27 RX ADMIN — GLIPIZIDE 10 MG: 10 TABLET ORAL at 06:54

## 2023-09-27 RX ADMIN — ATORVASTATIN CALCIUM 40 MG: 40 TABLET, FILM COATED ORAL at 19:57

## 2023-09-27 RX ADMIN — SODIUM CHLORIDE, PRESERVATIVE FREE 10 ML: 5 INJECTION INTRAVENOUS at 19:57

## 2023-09-27 RX ADMIN — SPIRONOLACTONE 25 MG: 25 TABLET ORAL at 08:30

## 2023-09-27 ASSESSMENT — PAIN SCALES - GENERAL: PAINLEVEL_OUTOF10: 0

## 2023-09-27 NOTE — PLAN OF CARE
Plan of care:    Hold enteresto and furosemide for 24 hours and restart tomorrow if BUN/Cr stable and blood pressure

## 2023-09-27 NOTE — PLAN OF CARE
Problem: Chronic Conditions and Co-morbidities  Goal: Patient's chronic conditions and co-morbidity symptoms are monitored and maintained or improved  9/27/2023 1647 by Davi Sky RN  Outcome: Progressing  9/27/2023 0736 by Amee Kitchen RN  Outcome: Progressing     Problem: Discharge Planning  Goal: Discharge to home or other facility with appropriate resources  9/27/2023 1647 by Davi Sky RN  Outcome: Progressing  9/27/2023 0736 by Amee Kitchen RN  Outcome: Progressing     Problem: ABCDS Injury Assessment  Goal: Absence of physical injury  9/27/2023 1647 by Davi Sky RN  Outcome: Progressing  9/27/2023 0736 by Amee Kitchen RN  Outcome: Progressing     Problem: Safety - Adult  Goal: Free from fall injury  9/27/2023 1647 by Davi Sky RN  Outcome: Progressing  9/27/2023 0736 by Amee Kitchen RN  Outcome: Progressing

## 2023-09-27 NOTE — PROGRESS NOTES
Occupational [address]  Occupational Therapy Not Seen Note    DATE: 2023    NAME: Bernie Fuentes  MRN: [id number]   : 1945      Patient not seen this date for Occupational Therapy due to:  Off floor for cardiac cath    Next Scheduled Treatment: 23    Electronically signed by YANN Miranda on 2023 at 4:31 PM

## 2023-09-27 NOTE — PROGRESS NOTES
[facility]  Office: [phone]  Jeremy Dunn DO, Melissa Rodríguez DO, Severiano Silvers, MD, Nico Vanegas MD, Willam James MD, Andrés Isaac MD,  Roly Vásquez MD, Jasmeet Silveira MD, Prosper Rosario DO, Tyler Mccormick MD,  Gianluca Bernstein DO, Adrien Fisher MD, Jaron Vaughan MD, Daniel Portillo DO, Trinity Gorman MD,  Delmy Contreras DO, Maryam Johnson MD, Singh Garza MD, Kimberly Kimble MD, Ede Teresa MD,  Crispin Moreno MD, Sury Castillo MD, Tiffany Galvez MD, Abida Sampson MD, Earline Wall DO, May Wei MD,  Tiana Aviles MD, Alex Ruffin MD, Martha Collins, CNP,  Samantha Ridley, CNP,, David Maya, CNP,  Toan Gordon, [facility], Tito Alpers, CNP, Xiao Choi CNP, Trudi Smith, CNP, Praveen Romero, CNP, Albin Rivera, CNP, Thanh Morales, CNP, Vinod Ibarra, CNS, Lorene Weber, CNP, Severo Forts, [facility] Elizabet Kulkarni Xavier Terry    Progress Note    9/27/2023    2:18 PM    Name:   Vianney Mari  MRN:     [id number]     Acct:      [de-identified]   Room:   2009/2009-01  IP Day:  2  Admit Date:  9/25/2023  1:54 PM    PCP:   HUGO Scott CNP  Code Status:  Full Code    Subjective:     C/C: sob  Interval History Status: improved. Patient seen after cardiac cath  No chest pain or sob  He is uncomfortable after procedure as he has to lie flat on bed  Started on aspirin and brilinta after stents placed  Labs and vitals reviewed  Bp 130s  Creatinine 1.3  Hb 11.5  Brief History:     78-year-old male with past medical history of systolic and diastolic heart failure, persistent A-fib, mitral regurgitation, moderate pulmonary hypertension, dyslipidemia type 2 diabetes mellitus on glipizide and metformin. Patient had aortic valve replacement in 2008 also history of colectomy after bowel perforation from colonoscopy.   Presents to the hospital as a transfer from [facility] diastolic heart failure-continue Lasix 40 mg IV twice daily, monitor intake and output, daily weights, fluid restriction to 1500 mL. Repeat echo, continue Aldactone, Germargi Grimes.     NSTEMI-uptrending troponin, s/p cardiac cath 9/27, s/p cardiac stents, continue aspirin and brilinta, check with cardiology regarding timing to resume coumadin    Moderate pulmonary hypertension-monitor oxygen saturations, continue diuretic    S/p aortic valve replacement-echo ordered    Follow-up on mitral regurgitation    Type 2 diabetes mellitus- on home glipizide, farxiga, insulin sliding scale as needed, continue to monitor blood sugars    Longstanding persistent A-fib- resume coumadin when okay with cardio    History of bowel perforation   Monitor kidney fxn, holding enteresto and lasix for today    Rosita Escalante MD  9/27/2023  2:18 PM

## 2023-09-27 NOTE — PROGRESS NOTES
Physical Therapy Cancel Note      DATE: 2023    NAME: Lucie Toledo  MRN: [id number]   : 1945      Patient not seen this date for Physical Therapy due to:    Surgery/Procedure: cardiac cath; check pm or       Electronically signed by Luciana Holloway PT on 2023 at 10:56 AM

## 2023-09-27 NOTE — CARE COORDINATION
Met with patient to discuss transitional planning. He is returning home with his wife.  He denies needs

## 2023-09-28 VITALS
TEMPERATURE: 97.4 F | RESPIRATION RATE: 19 BRPM | OXYGEN SATURATION: 96 % | BODY MASS INDEX: 25.34 KG/M2 | SYSTOLIC BLOOD PRESSURE: 126 MMHG | HEART RATE: 61 BPM | WEIGHT: 177 LBS | HEIGHT: 70 IN | DIASTOLIC BLOOD PRESSURE: 75 MMHG

## 2023-09-28 LAB
ANION GAP SERPL CALCULATED.3IONS-SCNC: 11 MMOL/L (ref 9–17)
BUN SERPL-MCNC: 30 MG/DL (ref 8–23)
CALCIUM SERPL-MCNC: 9.4 MG/DL (ref 8.6–10.4)
CHLORIDE SERPL-SCNC: 105 MMOL/L (ref 98–107)
CO2 SERPL-SCNC: 25 MMOL/L (ref 20–31)
CREAT SERPL-MCNC: 1.1 MG/DL (ref 0.7–1.2)
ERYTHROCYTE [DISTWIDTH] IN BLOOD BY AUTOMATED COUNT: 15.4 % (ref 11.8–14.4)
GFR SERPL CREATININE-BSD FRML MDRD: >60 ML/MIN/1.73M2
GLUCOSE BLD-MCNC: 170 MG/DL (ref 75–110)
GLUCOSE SERPL-MCNC: 93 MG/DL (ref 70–99)
HCT VFR BLD AUTO: 39.2 % (ref 40.7–50.3)
HGB BLD-MCNC: 12.3 G/DL (ref 13–17)
INR PPP: 1.3
MCH RBC QN AUTO: 30.2 PG (ref 25.2–33.5)
MCHC RBC AUTO-ENTMCNC: 31.4 G/DL (ref 28.4–34.8)
MCV RBC AUTO: 96.3 FL (ref 82.6–102.9)
NRBC BLD-RTO: 0 PER 100 WBC
PLATELET # BLD AUTO: 176 K/UL (ref 138–453)
PMV BLD AUTO: 11 FL (ref 8.1–13.5)
POTASSIUM SERPL-SCNC: 3.6 MMOL/L (ref 3.7–5.3)
PROTHROMBIN TIME: 15.7 SEC (ref 11.7–14.9)
RBC # BLD AUTO: 4.07 M/UL (ref 4.21–5.77)
SODIUM SERPL-SCNC: 141 MMOL/L (ref 135–144)
WBC OTHER # BLD: 7.4 K/UL (ref 3.5–11.3)

## 2023-09-28 PROCEDURE — 36415 COLL VENOUS BLD VENIPUNCTURE: CPT

## 2023-09-28 PROCEDURE — 99233 SBSQ HOSP IP/OBS HIGH 50: CPT

## 2023-09-28 PROCEDURE — 6370000000 HC RX 637 (ALT 250 FOR IP): Performed by: STUDENT IN AN ORGANIZED HEALTH CARE EDUCATION/TRAINING PROGRAM

## 2023-09-28 PROCEDURE — 6370000000 HC RX 637 (ALT 250 FOR IP): Performed by: NURSE PRACTITIONER

## 2023-09-28 PROCEDURE — 2580000003 HC RX 258: Performed by: NURSE PRACTITIONER

## 2023-09-28 PROCEDURE — 85027 COMPLETE CBC AUTOMATED: CPT

## 2023-09-28 PROCEDURE — 99239 HOSP IP/OBS DSCHRG MGMT >30: CPT | Performed by: STUDENT IN AN ORGANIZED HEALTH CARE EDUCATION/TRAINING PROGRAM

## 2023-09-28 PROCEDURE — 85610 PROTHROMBIN TIME: CPT

## 2023-09-28 PROCEDURE — 82947 ASSAY GLUCOSE BLOOD QUANT: CPT

## 2023-09-28 PROCEDURE — 80048 BASIC METABOLIC PNL TOTAL CA: CPT

## 2023-09-28 RX ORDER — FUROSEMIDE 40 MG/1
40 TABLET ORAL 2 TIMES DAILY
Qty: 60 TABLET | Refills: 3 | Status: SHIPPED | OUTPATIENT
Start: 2023-09-28

## 2023-09-28 RX ORDER — WARFARIN SODIUM 7.5 MG/1
7.5 TABLET ORAL
Status: DISCONTINUED | OUTPATIENT
Start: 2023-09-28 | End: 2023-09-28 | Stop reason: HOSPADM

## 2023-09-28 RX ORDER — FUROSEMIDE 40 MG/1
40 TABLET ORAL 2 TIMES DAILY
Status: DISCONTINUED | OUTPATIENT
Start: 2023-09-28 | End: 2023-09-28 | Stop reason: HOSPADM

## 2023-09-28 RX ORDER — ATORVASTATIN CALCIUM 40 MG/1
40 TABLET, FILM COATED ORAL NIGHTLY
Qty: 30 TABLET | Refills: 3 | Status: SHIPPED | OUTPATIENT
Start: 2023-09-28

## 2023-09-28 RX ADMIN — Medication 1 TABLET: at 09:13

## 2023-09-28 RX ADMIN — GLIPIZIDE 10 MG: 10 TABLET ORAL at 09:13

## 2023-09-28 RX ADMIN — SPIRONOLACTONE 25 MG: 25 TABLET ORAL at 09:13

## 2023-09-28 RX ADMIN — SODIUM CHLORIDE, PRESERVATIVE FREE 10 ML: 5 INJECTION INTRAVENOUS at 09:13

## 2023-09-28 RX ADMIN — FUROSEMIDE 40 MG: 40 TABLET ORAL at 09:18

## 2023-09-28 RX ADMIN — SACUBITRIL AND VALSARTAN 1 TABLET: 24; 26 TABLET, FILM COATED ORAL at 09:17

## 2023-09-28 RX ADMIN — TICAGRELOR 90 MG: 90 TABLET ORAL at 09:13

## 2023-09-28 RX ADMIN — CARVEDILOL 25 MG: 25 TABLET, FILM COATED ORAL at 09:13

## 2023-09-28 RX ADMIN — ASPIRIN 81 MG: 81 TABLET, CHEWABLE ORAL at 09:13

## 2023-09-28 RX ADMIN — EMPAGLIFLOZIN 10 MG: 10 TABLET, FILM COATED ORAL at 09:13

## 2023-09-28 ASSESSMENT — PAIN SCALES - GENERAL: PAINLEVEL_OUTOF10: 0

## 2023-09-28 NOTE — PROGRESS NOTES
Congestive Heart Failure Education completed and charted. CHF booklet given. Patient was receptive to education. Discussed the  importance of medication compliance. Discussed the importance of a heart healthy diet. Discussed 2000 mg sodium-restricted daily diet. Patient instructed to limit fluid intake to  1.5 to 2 liters per day. Patient instructed to weigh self at the same time of each day each morning, reinforced teaching to monitor for 3-5 lb weight increase over 1-2 days notify physician if change noted. Signs and symptoms of CHF discussed with patient, such as feeling more tired than normal, feeling short of breath, coughing that increases when lying down, sudden weight gain, swelling of the feet, legs or belly. Patient verbalized understanding to notify physician office if these symptoms occur. EF 25-30%  Pt is agreeable to an outpatient CHF Clinic referral . Referral placed.

## 2023-09-28 NOTE — PLAN OF CARE
Problem: Chronic Conditions and Co-morbidities  Goal: Patient's chronic conditions and co-morbidity symptoms are monitored and maintained or improved  9/28/2023 1501 by Vicky Hayes RN  Outcome: Completed  9/28/2023 1210 by Vicky Hayes RN  Outcome: Progressing  Flowsheets  Taken 9/28/2023 0839  Care Plan - Patient's Chronic Conditions and Co-Morbidity Symptoms are Monitored and Maintained or Improved: Monitor and assess patient's chronic conditions and comorbid symptoms for stability, deterioration, or improvement  Taken 9/28/2023 0800  Care Plan - Patient's Chronic Conditions and Co-Morbidity Symptoms are Monitored and Maintained or Improved: Monitor and assess patient's chronic conditions and comorbid symptoms for stability, deterioration, or improvement  9/28/2023 0413 by Suad Moon RN  Outcome: Progressing     Problem: Discharge Planning  Goal: Discharge to home or other facility with appropriate resources  9/28/2023 1501 by Vicky Hayes RN  Outcome: Completed  9/28/2023 1210 by Vicky Hayes RN  Outcome: Progressing  Flowsheets  Taken 9/28/2023 5849  Discharge to home or other facility with appropriate resources: Identify barriers to discharge with patient and caregiver  Taken 9/28/2023 0800  Discharge to home or other facility with appropriate resources: Identify barriers to discharge with patient and caregiver  9/28/2023 0413 by Suad Moon RN  Outcome: Progressing     Problem: ABCDS Injury Assessment  Goal: Absence of physical injury  9/28/2023 1501 by Vicky Hayes RN  Outcome: Completed  9/28/2023 1210 by Vicky Hayes RN  Outcome: Progressing  9/28/2023 0413 by Suad Moon RN  Outcome: Progressing     Problem: Safety - Adult  Goal: Free from fall injury  9/28/2023 1501 by Vicky Hayes RN  Outcome: Completed  9/28/2023 1210 by Vicky Hayes RN  Outcome: Progressing  9/28/2023 0413 by Suad Moon RN  Outcome: Progressing

## 2023-09-28 NOTE — PROGRESS NOTES
CLINICAL PHARMACY NOTE: MEDS TO BEDS    Total # of Prescriptions Filled: 3   The following medications were delivered to the patient:  Atorvastatin  brilinta    Additional Documentation:   Furosemide too soon

## 2023-09-28 NOTE — PROGRESS NOTES
[facility]  Progress Note                   Date:   9/28/2023  Patient name: Lashell Coelho  Date of admission:  9/25/2023  1:54 PM  MRN:   [id number]  YOB: 1945  PCP: HUGO Warren CNP    Reason for Admission: CHF (congestive heart failure), NYHA class I, unspecified failure chronicity, unspecified type ([address]) [I50.9]    Subjective:       Clinical Changes /Abnormalities: Patient seen and examined. , Denies CP or SOB> labs, vitals, and tele reviewed    Review of Systems    Medications:   Scheduled Meds:   furosemide  40 mg Oral BID    warfarin  7.5 mg Oral Once    ticagrelor  90 mg Oral BID    warfarin placeholder: dosing by pharmacy   Other RX Placeholder    atorvastatin  40 mg Oral Nightly    carvedilol  25 mg Oral BID with meals    empagliflozin  10 mg Oral Daily    [Held by provider] metFORMIN  500 mg Oral BID WC    glipiZIDE  10 mg Oral BID AC    therapeutic multivitamin-minerals  1 tablet Oral Daily    sacubitril-valsartan  1 tablet Oral BID    spironolactone  25 mg Oral Daily    sodium chloride flush  5-40 mL IntraVENous 2 times per day    insulin lispro  0-4 Units SubCUTAneous TID WC    insulin lispro  0-4 Units SubCUTAneous Nightly    aspirin  81 mg Oral Daily     Continuous Infusions:   sodium chloride      dextrose       CBC:   Recent Labs     09/26/23  0556 09/27/23  0716 09/28/23  0625   WBC 6.9 6.9 7.4   HGB 11.8* 11.5* 12.3*    167 176     BMP:    Recent Labs     09/26/23  0556 09/27/23  0716 09/28/23  0625    139 141   K 3.7 3.8 3.6*    102 105   CO2 24 25 25   BUN 34* 33* 30*   CREATININE 1.2 1.3* 1.1   GLUCOSE 95 100* 93     Hepatic:  Recent Labs     09/25/23  1427   AST 25   ALT 17   BILITOT 1.0   ALKPHOS 59     Troponin:   Recent Labs     09/25/23  1427 09/25/23  1917 09/27/23  0716   TROPHS 328* 405* 466*     BNP: No results for input(s): \"BNP\" in the last 72 hours.   Lipids:   Recent Labs     09/25/23  1427   CHOL 221*   HDL 42

## 2023-09-28 NOTE — PLAN OF CARE
Problem: Chronic Conditions and Co-morbidities  Goal: Patient's chronic conditions and co-morbidity symptoms are monitored and maintained or improved  9/28/2023 1210 by Patrice Mensah RN  Outcome: Progressing  Flowsheets  Taken 9/28/2023 0839  Care Plan - Patient's Chronic Conditions and Co-Morbidity Symptoms are Monitored and Maintained or Improved: Monitor and assess patient's chronic conditions and comorbid symptoms for stability, deterioration, or improvement  Taken 9/28/2023 0800  Care Plan - Patient's Chronic Conditions and Co-Morbidity Symptoms are Monitored and Maintained or Improved: Monitor and assess patient's chronic conditions and comorbid symptoms for stability, deterioration, or improvement  9/28/2023 0413 by Thien Marie RN  Outcome: Progressing     Problem: Discharge Planning  Goal: Discharge to home or other facility with appropriate resources  9/28/2023 1210 by Patrice Mensah RN  Outcome: Progressing  Flowsheets  Taken 9/28/2023 3392  Discharge to home or other facility with appropriate resources: Identify barriers to discharge with patient and caregiver  Taken 9/28/2023 0800  Discharge to home or other facility with appropriate resources: Identify barriers to discharge with patient and caregiver  9/28/2023 0413 by Thien Marie RN  Outcome: Progressing     Problem: ABCDS Injury Assessment  Goal: Absence of physical injury  9/28/2023 1210 by Patrice Mensah RN  Outcome: Progressing  9/28/2023 0413 by Thien Marie RN  Outcome: Progressing     Problem: Safety - Adult  Goal: Free from fall injury  9/28/2023 1210 by Patrice Mensah RN  Outcome: Progressing  9/28/2023 0413 by Thien Marie RN  Outcome: Progressing

## 2023-09-28 NOTE — PLAN OF CARE
Problem: Chronic Conditions and Co-morbidities  Goal: Patient's chronic conditions and co-morbidity symptoms are monitored and maintained or improved  9/28/2023 0413 by Omid Espinosa RN  Outcome: Progressing  9/27/2023 1647 by Trung Morillo RN  Outcome: Progressing     Problem: Discharge Planning  Goal: Discharge to home or other facility with appropriate resources  9/28/2023 0413 by Omid Espinosa RN  Outcome: Progressing  9/27/2023 1647 by Trung Morillo RN  Outcome: Progressing     Problem: ABCDS Injury Assessment  Goal: Absence of physical injury  9/28/2023 0413 by Omid Espinosa RN  Outcome: Progressing  9/27/2023 1647 by Trung Morillo RN  Outcome: Progressing     Problem: Safety - Adult  Goal: Free from fall injury  9/28/2023 0413 by Omid Espinosa RN  Outcome: Progressing  9/27/2023 1647 by Trung Morillo RN  Outcome: Progressing

## 2023-09-28 NOTE — PROGRESS NOTES
Pharmacy Note  Warfarin Consult follow-up      Recent Labs     09/28/23  0625   INR 1.3     Recent Labs     09/26/23  0556 09/27/23  0716 09/28/23  0625   HGB 11.8* 11.5* 12.3*   HCT 37.8* 36.7* 39.2*    167 176       Significant Drug-Drug Interactions:  New warfarin drug-drug interactions: None  Discontinued drug-drug interactions: None      Notes:                   INR today 1.1. Will give warfarin 7.5 mg today. Daily PT/INR while inpatient.     Ondina Aragon PharmD, BCPS  9/28/2023  11:28 AM

## 2023-09-28 NOTE — PROGRESS NOTES
Called pt phone number and pt has a VM that is full, unable to LVM     Called daughter Alina pt daughter ok per verbal and left msg to call back in regards to pt in regards to procedure schedule on  06/25  Electronically Signed by:    ANAHI Ash , 05/23/19       Reviewed discharge instructions with the pt and wife- answered all questions. Removed IV and telemetry, pt dressed self and gathered all belongings. Meds were delivered via OP pharmacy.

## 2023-09-28 NOTE — CARE COORDINATION
Met with patient to discuss transitional planning. He is returning home independently.  He denies needs and has transportation home    Discharge [address] Case Management Department  Written by: Geo Bhakta RN    Patient Name: Miah Kumar  Attending Provider: Neema Clifford MD  Admit Date: 2023  1:54 PM  MRN: [id number]  Account: [de-identified]                     : 1945  Discharge Date: 2023      Disposition: home    Geo Bhakta RN

## 2023-09-28 NOTE — DISCHARGE SUMMARY
[facility]  Office: [phone]  Aunpravin Wisdom DO, Jordy Park DO, Marcus Mendieta DO, Emily Rodríguez DO, Ron Andrews MD, Yessenia Fitzgerald MD, Gladys Jones MD, Gogo Ohara MD,  Ty Roper MD, Ricky Avila MD, Angel To DO, Ilda Hamilton MD,  Wing Ledy MD, Shelby Padilla MD, Deysi Kennedy DO, Sary Kaminski MD,  Nancy Carrero DO, Concha Dan MD, Reagan Germain MD, Kodak Silva MD, Reid Fang MD,  Demetri Braswell MD, Paola Gerber MD, Yamini Montoya MD, Curt Xiao MD, Chris Holman DO, Kalia Pate MD,  Rich Willett MD, Jenna Kinney MD, Selvin Brenner, CNP,  Maeve Tafoya CNP,, Leslie Loomis CNP,  Cookie Guerrero [facility] [place] Raul Viera, CNP, Laila Dunn, CNP, Praveen Peter CNP, Drew Vang CNP, Toney Broussard, CNP, Jenny Amado CNP, Maria Isabel Bob CNS, Corrie Avila, CNP, Henny Armstrong, [facility]    Discharge Summary     Patient ID: Eitan Diop  :  1945   MRN: [id number]     ACCOUNT:  [de-identified]   Patient's PCP: HUGO Anglin CNP  Admit Date: 2023   Discharge Date: 2023     Length of Stay: 3  Code Status:  Full Code  Admitting Physician: No admitting provider for patient encounter.   Discharge Physician: Aleida Moore MD     Active Discharge Diagnoses:     Hospital Problem Lists:  Principal Problem:    Acute on chronic combined systolic (congestive) and diastolic (congestive) heart failure (HCC)  Active Problems:    NSTEMI (non-ST elevated myocardial infarction) ([address])    Type 2 diabetes mellitus without complication ([address])    Hyperlipidemia    Essential hypertension    H/O aortic valve replacement    Atrial fibrillation (HCC)    Longstanding persistent atrial fibrillation (HCC)    Moderate pulmonary hypertension ([address])    CAD S/P percutaneous coronary angioplasty  Resolved Problems:    CHF (congestive heart Standing Status: Future Standing Exp. Date: 09/28/24   Order Comments: Follow potassium levels, discuss with pcp     AMB REFERRAL TO HEART FAILURE CLINIC   Referral Priority: Routine Referral Type: Eval and Treat   Referral Reason: Specialty Services Required   Number of Visits Requested: 1       Time Spent on discharge is  35 mins in patient examination, evaluation, counseling as well as medication reconciliation, prescriptions for required medications, discharge plan and follow up. Electronically signed by   Stevenson Tay MD  9/28/2023  2:52 PM      Thank you Dr. Julius Burkitt, APRN - JOHN for the opportunity to be involved in this patient's care.

## 2023-09-29 ENCOUNTER — CARE COORDINATION (OUTPATIENT)
Dept: CASE MANAGEMENT | Age: 78
End: 2023-09-29

## 2023-09-29 ENCOUNTER — TELEPHONE (OUTPATIENT)
Dept: OTHER | Age: 78
End: 2023-09-29

## 2023-09-29 LAB — ECHO BSA: 1.99 M2

## 2023-09-29 NOTE — CARE COORDINATION
[facility] Care Transitions Initial Follow Up Call    Call within 2 business days of discharge: Yes        Patient: Damion Amato Patient : 1945   MRN: [id number]  Reason for Admission: CHF/ NSTEMI with cardiac cath with stents  Discharge Date: 23 RARS: Readmission Risk Score: 8.8      Last Discharge [facility] [facility] [facility]       Date Complaint Diagnosis Description Type Department Provider    23  Chronic combined systolic and diastolic congestive heart failure ([address]) . .. Admission (Discharged) Karian Galeas 2 Goyo Ovalle MD            Was this an external facility discharge? No Discharge Facility: [facility]    Challenges to be reviewed by the provider   Additional needs identified to be addressed with provider: Yes  7 day hospital follow up appointment               Method of communication with provider: chart routing. 1st attempt to reach patient for Care Transitions. [facility] requesting return call. Contact information provided.   [phone]      Care Transitions 24 Hour Call    Care Transitions Interventions                                     Follow Up  Future Appointments   Date Time Provider [address] [place] [address]   10/4/2023  9:00 AM STV CHF CLINIC RM 1 STVZ CHF CLI [facility]   10/17/2023  9:30 AM HUGO Molina CNP [facility]   2023  1:20 PM HUGO Montez CNP [facility] IVETTE Mcmillan RN

## 2023-10-02 ENCOUNTER — TELEPHONE (OUTPATIENT)
Dept: PRIMARY CARE CLINIC | Age: 78
End: 2023-10-02

## 2023-10-02 ENCOUNTER — TELEPHONE (OUTPATIENT)
Dept: PHARMACY | Age: 78
End: 2023-10-02

## 2023-10-02 ENCOUNTER — CARE COORDINATION (OUTPATIENT)
Dept: CASE MANAGEMENT | Age: 78
End: 2023-10-02

## 2023-10-02 DIAGNOSIS — I50.43 ACUTE ON CHRONIC COMBINED SYSTOLIC (CONGESTIVE) AND DIASTOLIC (CONGESTIVE) HEART FAILURE (HCC): Primary | ICD-10-CM

## 2023-10-02 PROCEDURE — 1111F DSCHRG MED/CURRENT MED MERGE: CPT | Performed by: NURSE PRACTITIONER

## 2023-10-02 NOTE — TELEPHONE ENCOUNTER
He does not need appt with me at this time as he was just in 9/15 for diabetes follow up  Follow up with cardio as planned  Thanks

## 2023-10-02 NOTE — TELEPHONE ENCOUNTER
Contacted patient to schedule INR check. Patient not seen in clinic since June 2023. Patient recently hospitalized and discharged (see 9/25 notes). Appointment scheduled for INR check. Patient was under the impression he had an appointment scheduled already, but this was not the case; however, there was an opening in our clinic for tomorrow (10/3) morning, so he will be in then.     Epi Ware [facility], PharmD, BCACP  10/2/2023  2:05 PM

## 2023-10-02 NOTE — CARE COORDINATION
[facility] Care Transitions Initial Follow Up Call    Call within 2 business days of discharge: Yes    Patient Current Location:  Home: [address]  [address] [address]    Care Transition Nurse contacted the patient by telephone to perform post hospital discharge assessment. Verified name and  with patient as identifiers. Provided introduction to self, and explanation of the Care Transition Nurse role. Patient: Taisha Sinha Patient : 1945   MRN: [id number]  Reason for Admission: Combined systolic and diastolic CHF, hypokalemia, unstable angina  Discharge Date: 23 RARS: Readmission Risk Score: 8.8      Last Discharge [facility] [facility] [facility]       Date Complaint Diagnosis Description Type Department Provider    23  Chronic combined systolic and diastolic congestive heart failure ([address]) . .. Admission (Discharged) Janey Ball 2 Angeline Lauren MD            Was this an external facility discharge? No Discharge Facility: [facility]    Challenges to be reviewed by the provider   Additional needs identified to be addressed with provider: No  none               Method of communication with provider: none. Spoke to Sherry for transitions initial call. Stated he was a little SOB after discharge on  but is improving. Denies CP./pressure, palpitations, worsening edema. Pt started new medications Lipitor, Aldactone and Brilinta, is aware of changes to Lasix. Reminded pt of lab work BMP due by tomorrow. Pt has consultation with CHF Clinic on 10/4, Cardiology appt is 10/17. Spouse will call PCP for f/u appt, declined assistance. No immediate needs or concerns. Care Transition Nurse reviewed discharge instructions with patient who verbalized understanding. The patient was given an opportunity to ask questions and does not have any further questions or concerns at this time. Were discharge instructions available to patient? Yes.  Reviewed appropriate site of care based on symptoms and resources available to patient

## 2023-10-02 NOTE — TELEPHONE ENCOUNTER
Care Transitions Initial Follow Up Call    Outreach made within 2 business days of discharge: Yes    Patient: Gabby Wren Patient : 1945   MRN: [id number]  Reason for Admission: There are no discharge diagnoses documented for the most recent discharge. Discharge Date: 23       Spoke with: Emma Singh Spouse    Discharge department/facility: [address] [place] [address]    TCM Interactive Patient Contact:  Was patient able to fill all prescriptions: Yes  Was patient instructed to bring all medications to the follow-up visit: Yes  Is patient taking all medications as directed in the discharge summary?  Yes  Does patient understand their discharge instructions: Yes  Does patient have questions or concerns that need addressed prior to 7-14 day follow up office visit: no    Scheduled appointment with PCP within 7-14 days    Follow Up  Future Appointments   Date Time Provider [address] [place] [address]   10/4/2023  9:00 AM STV CHF CLINIC RM 1 STVZ CHF CLI [facility]   10/17/2023  9:30 AM HUGO Zamora CNP [facility]   2023  1:20 PM HUGO Hall CNP [facility] Yakov Mcmahan MA

## 2023-10-02 NOTE — TELEPHONE ENCOUNTER
Patients spouse Lola Tilley calling into the office. Wanted to let us know that patient was in the hospital. Has a follow up with cardiology. Patient does not need anything at this time. Would you like him to still come in?  Please advise

## 2023-10-03 ENCOUNTER — HOSPITAL ENCOUNTER (OUTPATIENT)
Dept: PHARMACY | Age: 78
Setting detail: THERAPIES SERIES
Discharge: HOME | End: 2023-10-03
Payer: MEDICARE

## 2023-10-03 ENCOUNTER — HOSPITAL ENCOUNTER (OUTPATIENT)
Age: 78
Discharge: HOME | End: 2023-10-03
Payer: MEDICARE

## 2023-10-03 DIAGNOSIS — I48.91 ATRIAL FIBRILLATION, UNSPECIFIED TYPE (HCC): Primary | ICD-10-CM

## 2023-10-03 DIAGNOSIS — E87.6 HYPOKALEMIA: ICD-10-CM

## 2023-10-03 LAB
ANION GAP SERPL CALCULATED.3IONS-SCNC: 14 MMOL/L (ref 9–17)
BUN SERPL-MCNC: 38 MG/DL (ref 8–23)
CALCIUM SERPL-MCNC: 10 MG/DL (ref 8.6–10.4)
CHLORIDE SERPL-SCNC: 103 MMOL/L (ref 98–107)
CO2 SERPL-SCNC: 26 MMOL/L (ref 20–31)
CREAT SERPL-MCNC: 1.4 MG/DL (ref 0.7–1.2)
GFR SERPL CREATININE-BSD FRML MDRD: 51 ML/MIN/1.73M2
GLUCOSE SERPL-MCNC: 181 MG/DL (ref 70–99)
INR BLD: 1.5
POTASSIUM SERPL-SCNC: 4.5 MMOL/L (ref 3.7–5.3)
PROTIME: 17.7 SECONDS
SODIUM SERPL-SCNC: 143 MMOL/L (ref 135–144)

## 2023-10-03 PROCEDURE — 80048 BASIC METABOLIC PNL TOTAL CA: CPT

## 2023-10-03 PROCEDURE — 99212 OFFICE O/P EST SF 10 MIN: CPT

## 2023-10-03 PROCEDURE — 36415 COLL VENOUS BLD VENIPUNCTURE: CPT

## 2023-10-03 PROCEDURE — 85610 PROTHROMBIN TIME: CPT

## 2023-10-04 ENCOUNTER — HOSPITAL ENCOUNTER (OUTPATIENT)
Dept: OTHER | Age: 78
Discharge: HOME | End: 2023-10-04
Payer: MEDICARE

## 2023-10-04 VITALS
DIASTOLIC BLOOD PRESSURE: 70 MMHG | RESPIRATION RATE: 16 BRPM | OXYGEN SATURATION: 96 % | SYSTOLIC BLOOD PRESSURE: 118 MMHG | HEART RATE: 60 BPM | BODY MASS INDEX: 24.94 KG/M2 | WEIGHT: 173.8 LBS

## 2023-10-04 DIAGNOSIS — I50.43 ACUTE ON CHRONIC COMBINED SYSTOLIC (CONGESTIVE) AND DIASTOLIC (CONGESTIVE) HEART FAILURE (HCC): Primary | ICD-10-CM

## 2023-10-04 PROCEDURE — 99212 OFFICE O/P EST SF 10 MIN: CPT

## 2023-10-04 PROCEDURE — 99215 OFFICE O/P EST HI 40 MIN: CPT | Performed by: NURSE PRACTITIONER

## 2023-10-04 ASSESSMENT — ENCOUNTER SYMPTOMS
SHORTNESS OF BREATH: 1
BLOOD IN STOOL: 0
ABDOMINAL PAIN: 0
COUGH: 0
EYE DISCHARGE: 0

## 2023-10-04 NOTE — PROGRESS NOTES
CHF Clinic at [address] [facility]    Office: [phone] Fax: [phone] CHF CLINIC  [address] [address]  Dept: [phone]  Loc: [phone]    Vianney Mari is a 78 y.o. male who presents today for CHF evaluation. HPI:     Here w/ wife  Eats out often  Occas shortness of breath, occurs when taking all his pills at once. He will start spacing them out. Denies fatigue, works  40 h/ week at [employer] as    Denies Edema   Denies chest pain   Denies  palpitations     Jennifer Sinclair was added last admit. He had f/u labs, K+ is now normal, but bun/Cr , GFR have worsened sightly.  Will monitor           Past Medical History:   Diagnosis Date    Chronic combined systolic and diastolic HF (heart failure) (HCC)     Diabetes ([address])     H/O aortic valve replacement     Hyperlipidemia     Hypertension     Kidney stones     Longstanding persistent atrial fibrillation (HCC)     Moderate pulmonary hypertension ([address])      Past Surgical History:   Procedure Laterality Date    CARDIAC PROCEDURE N/A 9/27/2023    wilson / Left heart cath / coronary angiography / rm 2009 performed by Johanna Pablo MD at [address] [facility] N/A 9/27/2023    Insert stent chyna coronary performed by Johanna Pablo MD at [facility] N/A 9/27/2023    Intravascular ultrasound performed by Johanna Pablo MD at [facility]  02/29/2008    freestyle aortic root heart valve    COLON SURGERY      perforate during colonoscopy       Family History   Problem Relation Age of Onset    Diabetes Father        Social History     Tobacco Use    Smoking status: Former     Packs/day: 1.00     Years: 20.00     Additional pack years: 0.00     Total pack years: 20.00     Types: Cigarettes     Quit date: 2013     Years since quitting: 10.7    Smokeless

## 2023-10-06 ENCOUNTER — CARE COORDINATION (OUTPATIENT)
Dept: CASE MANAGEMENT | Age: 78
End: 2023-10-06

## 2023-10-06 NOTE — CARE COORDINATION
Care Transitions Follow Up Call    Patient Current Location:  Home: [address]    Care Transition Nurse contacted the patient by telephone to follow up after admission on 23. Verified name and  with patient as identifiers. Patient: Damion Amato  Patient : 1945   MRN: [id number]  Reason for Admission: Combined systolic and diastolic CHF, hypokalemia, unstable angina  Discharge Date: 23 RARS: Readmission Risk Score: 8.8      Needs to be reviewed by the provider   Additional needs identified to be addressed with provider: No  none             Method of communication with provider: none. Attempted to contact [address] for transitional outreach, but his wife, Sade Willett answered the phone. She stated that he was doing \"good\". She said that his breathing was better, weight was stable, swelling was down and he did not complain of any chest pain/palpitations. She said that he has a cardio appointment on 10/17 and his PCP said that he did not have to come in and see her. Just to follow up with cardio. No further questions or concerns. Addressed changes since last contact:  none  Discussed follow-up appointments. If no appointment was previously scheduled, appointment scheduling offered: Yes. Is follow up appointment scheduled within 7 days of discharge? No.    Follow Up  Future Appointments   Date Time Provider [address]   10/10/2023  7:10 AM STCZ MEDICATION MGMT STC MED MGMT [facility]   10/12/2023  9:30 AM STC CARD [facility] RM 05 STCZ CARDIAC [facility]   10/17/2023  9:30 AM Missy Khan APRN - CNP [facility]   10/27/2023  9:00 AM STV CHF CLINIC RM 1 STVZ CHF CLI [facility]   2023  1:20 PM Ankush Wise, APRN - CNP [facility] [address] Transition Nurse reviewed medical action plan with family and discussed any barriers to care and/or understanding of plan of care after discharge.  Discussed appropriate site of care based

## 2023-10-10 ENCOUNTER — APPOINTMENT (OUTPATIENT)
Dept: PHARMACY | Age: 78
End: 2023-10-10
Payer: MEDICARE

## 2023-10-10 ENCOUNTER — TELEPHONE (OUTPATIENT)
Dept: PHARMACY | Age: 78
End: 2023-10-10

## 2023-10-10 DIAGNOSIS — I48.21 PERMANENT ATRIAL FIBRILLATION (HCC): Primary | ICD-10-CM

## 2023-10-10 LAB
INR BLD: 2.3
PROTIME: 27.9 SECONDS

## 2023-10-10 PROCEDURE — 99211 OFF/OP EST MAY X REQ PHY/QHP: CPT | Performed by: PHARMACIST

## 2023-10-10 PROCEDURE — 85610 PROTHROMBIN TIME: CPT | Performed by: PHARMACIST

## 2023-10-10 NOTE — PROGRESS NOTES
Patient seen in person in Medication Management Service. Patient states compliant most of the time with regimen. However, patient states he has been taking 5 mg Tues/Sat instead of Mon/Thurs. No bleeding or thromboembolic side effects noted. No significant med or dietary changes. The patient has been on three new medications: Lipitor, Spironolactone and Brillenta for the past 2 weeks. No significant recent illness or disease state changes. PT/INR done via POC meter per protocol. INR was therapeutic at 2.3.  (goal 2 - 3)    Warfarin regimen will be continued at current dose 5 mg Tues/Sat and 7.5 mg all other days. Will retest in 2 weeks. Patient understands dosing directions and information discussed. Dosing schedule and follow up appointment given to patient. Progress note routed to referring physicians office. Patient acknowledges working in [address] [place] [address] with Pharmacist as referred by his/her physician/provider. COVID 19 screening completed. At this time patient denies symptoms, recent travel and exposure. Patient educated to screen for temperature and COVID-19 symptoms prior to coming to clinic for next appointment. They are instructed to call the clinic to reschedule if they have any symptoms. Standing order for PT/INR has been placed in preparation to transition to possible remote INR monitoring given efforts to reduce the spread of COVID-19.       For Pharmacy Admin Tracking Only    Intervention Detail: Adherence Monitorin  Total # of Interventions Recommended: 1  Total # of Interventions Accepted: 1  Time Spent (min): 20    Cesar Gaffney, Pharm D, [address] Medication Management Clinic  10/10/2023 9:10 AM

## 2023-10-10 NOTE — TELEPHONE ENCOUNTER
Patient was a no show for Medication Management Clinic appointment today for INR check. Left message for patient to call back to reschedule. Indicated the importance of appropriate follow-up.    Amirah Murguia, Marybeth D, [facility]  [facility] Medication Management Clinic  10/10/2023 8:38 AM

## 2023-10-11 ENCOUNTER — TELEPHONE (OUTPATIENT)
Dept: OTHER | Age: 78
End: 2023-10-11

## 2023-10-12 ENCOUNTER — HOSPITAL ENCOUNTER (OUTPATIENT)
Dept: CARDIAC REHAB | Age: 78
Setting detail: THERAPIES SERIES
Discharge: HOME | End: 2023-10-12
Payer: MEDICARE

## 2023-10-12 VITALS — OXYGEN SATURATION: 99 % | RESPIRATION RATE: 16 BRPM | HEIGHT: 70 IN | WEIGHT: 170.1 LBS | BODY MASS INDEX: 24.35 KG/M2

## 2023-10-12 PROCEDURE — 93797 PHYS/QHP OP CAR RHAB WO ECG: CPT

## 2023-10-12 PROCEDURE — 93798 PHYS/QHP OP CAR RHAB W/ECG: CPT

## 2023-10-12 ASSESSMENT — EXERCISE STRESS TEST
PEAK_METS: 3.3
PEAK_BP: 136/72
PEAK_RPE: 9
PEAK_HR: 83
PEAK_BP: 136/72

## 2023-10-12 ASSESSMENT — PATIENT HEALTH QUESTIONNAIRE - PHQ9
SUM OF ALL RESPONSES TO PHQ QUESTIONS 1-9: 0
1. LITTLE INTEREST OR PLEASURE IN DOING THINGS: 0
2. FEELING DOWN, DEPRESSED OR HOPELESS: 0
SUM OF ALL RESPONSES TO PHQ9 QUESTIONS 1 & 2: 0
SUM OF ALL RESPONSES TO PHQ QUESTIONS 1-9: 0

## 2023-10-12 ASSESSMENT — EJECTION FRACTION: EF_VALUE: 25

## 2023-10-12 NOTE — PROGRESS NOTES
Pt oriented to Cardiac Rehab, goals set and ITP initiated. CAD Risk Factors & Prevention video viewed. show

## 2023-10-12 NOTE — FLOWSHEET NOTE
10/12/23 0947   Treatment Diagnosis   Treatment Diagnosis 1 PCI   PCI Date 09/27/23   Treatment Diagnosis 2 NSTEMI   NSTEMI Date 09/25/23   Referral Date 10/02/23   Significant Cardiovascular History History of heart failure;Chronic atrial fibrillation;Previous PCI  (VALVE REPLACEMENT 2/29/2023; HTN; HLD)   Co-morbidities Diabetes  (HTN; HLD)   Oxygen Saturation / Titration    Stages of Change  Maintenance   Oxygen Intervention   Oxygen Use No   O2 Sat Greater Than 90% Yes  (99%)   Nurse/Patient Discussion  YES   Individual Treatment Plan   ITP Visit Type Initial assessment   1st Date of Exercise  10/12/23   ITP Next Review Date 11/10/23   Visit #/Total Visits 1&2/36   EF% 25 %  (25-30%)   Risk Stratification High   ITP Exercise;Psychosocial;Tobacco;Nutrition;Education   Exercise    Crisostomo Total Score 15   DASI Total Score 24.95   Stages of Change Action   Assisted Devices None   Exercise Prescription   Mode Treadmill;Bike;Stepper;Ergometer  (FREE WEIGHTS)   Frequency per week 3   Duration Per Session 31 MIN   Intensity METS       3.3   RPE 9   Progression INITIAL   Resistance Training Yes   Exercise Blood Pressures   Resting /72   Peak /72   Is BP WDL Yes   Exercise Activity at Home   Type NONE CURRENTLY   Resistance Training No   Exercise Education   Education Self pulse;Exercise safety;Signs/symptoms to report;RPE scale;Equipment orientation; Warm up/cool down;Physically active   Exercise Target Goal   Target Goal(s) Individual exercise RX;BP < 140/90 or < 130/80, if DM or CKD; Aerobic activity 30 + minutes/day  5 days/week   Patient Stated Exercise Goals TO GET IN SHAPE; BUILD HEART UP; FEEL BACK TO NORMAL; STAY ON DIET AND EAT BETTER   Psychosocial   Stages of Change Maintenance   PHQ-9 Total Score 0   Psychosocial Intervention   Interventions No intervention indicated   Currently Taking Psychotropic Meds No   Medication Changes No   Psychosocial Education   Education Benefits of CPR

## 2023-10-13 ENCOUNTER — CARE COORDINATION (OUTPATIENT)
Dept: CASE MANAGEMENT | Age: 78
End: 2023-10-13

## 2023-10-13 NOTE — CARE COORDINATION
discharge. Discussed appropriate site of care based on symptoms and resources available to patient including: PCP  Specialist  When to call 911. The patient agrees to contact the PCP office for questions related to their healthcare. Patients top risk factors for readmission: medical condition-CHF  Interventions to address risk factors: Obtained and reviewed discharge summary and/or continuity of care documents    Offered patient enrollment in the Remote Patient Monitoring (RPM) program for in-home monitoring:  did not address . Care Transitions Subsequent and Final Call    Subsequent and Final Calls  Do you have any ongoing symptoms?: No  Have your medications changed?: No  Do you have any questions related to your medications?: No  Do you currently have any active services?: Yes  Are you currently active with any services?: Outpatient/Community Services  Do you have any needs or concerns that I can assist you with?: No  Care Transitions Interventions                          Other Interventions:             Care Transition Nurse provided contact information for future needs. Plan for follow-up call in 7-10 days based on severity of symptoms and risk factors. Plan for next call: follow-up appointment-cardio appointment. If returning to work.   S/s of CHF and if willing to have Jeanie Jalloh RN

## 2023-10-16 ENCOUNTER — HOSPITAL ENCOUNTER (OUTPATIENT)
Dept: CARDIAC REHAB | Age: 78
Setting detail: THERAPIES SERIES
Discharge: HOME | End: 2023-10-16
Payer: MEDICARE

## 2023-10-16 VITALS — WEIGHT: 170 LBS | BODY MASS INDEX: 24.39 KG/M2

## 2023-10-16 PROCEDURE — 93798 PHYS/QHP OP CAR RHAB W/ECG: CPT

## 2023-10-16 ASSESSMENT — EXERCISE STRESS TEST
PEAK_RPE: 11
PEAK_METS: 5
PEAK_HR: 104
PEAK_BP: 140/88

## 2023-10-18 ENCOUNTER — CARE COORDINATION (OUTPATIENT)
Dept: CASE MANAGEMENT | Age: 78
End: 2023-10-18

## 2023-10-18 NOTE — CARE COORDINATION
Care Transitions Follow Up Call    Patient Current Location:  Home: [address] [place] [address]  [address]  [address]    Care Transition Nurse contacted the family by telephone to follow up after admission on 23. Verified name and  with family as identifiers. Patient: Amaya López  Patient : 1945   MRN: [id number]  Reason for Admission: Combined systolic and diastolic CHF, hypokalemia, unstable angina  Discharge Date: 23 RARS: Readmission Risk Score: 8.8      Needs to be reviewed by the provider   Additional needs identified to be addressed with provider: No  none             Method of communication with provider: none. Attempted to contact Hank Lopez for transitional outreach, but his wife answered the phone. She stated that Hank Lopez returned to work today. She said that he did go to the cardiologist appointment and the Curvin Calkin was stopped due to cost and he was started on Farxiga. He was given samples. He will be getting a life vest today at 4 pm.  She said that he was doing good and did no have any complaints     Addressed changes since last contact:  none  Discussed follow-up appointments. If no appointment was previously scheduled, appointment scheduling offered: Yes. Is follow up appointment scheduled within 7 days of discharge? No.    Follow Up  Future Appointments   Date Time Provider [address]   10/27/2023  7:30 AM STCZ MEDICATION MGMT [facility] MED [facility]   10/27/2023  9:00 AM STV CHF CLINIC RM 1 STVZ CHF CLI [facility]   10/27/2023  1:45 PM SCHEDULE, [facility] INTERMED [facility]   2023  1:20 PM HUGO Gaspar CNP [facility]   2024  9:30 AM Nae Khan APRN - CNP [facility]       Care Transition Nurse reviewed medical action plan with family and discussed any barriers to care and/or understanding of plan of care after discharge.  Discussed appropriate site of care based on symptoms and resources available to patient

## 2023-10-20 ENCOUNTER — IMMUNIZATION (OUTPATIENT)
Dept: PRIMARY CARE CLINIC | Age: 78
End: 2023-10-20

## 2023-10-20 DIAGNOSIS — Z23 NEED FOR INFLUENZA VACCINATION: Primary | ICD-10-CM

## 2023-10-20 NOTE — PROGRESS NOTES
Vaccine Information Sheet, \"Influenza - Inactivated\"  given to Lauren Dick, or parent/legal guardian of  Lauren Dick and verbalized understanding. Injection was given in Left deltoid by Neeru Salmeron MA. Patient responses:  Have you ever had a reaction to a flu vaccine? No  Are you able to eat eggs without adverse effects? No  Do you have any current illness? No  Have you ever had Guillian [place] Syndrome? No    Flu vaccine given per order. Please see immunization tab. Patient tolerated injection & left the office a few minutes later feeling well.

## 2023-10-24 RX ORDER — WARFARIN SODIUM 5 MG/1
TABLET ORAL
Qty: 135 TABLET | Refills: 0 | Status: SHIPPED | OUTPATIENT
Start: 2023-10-24

## 2023-10-25 ENCOUNTER — CARE COORDINATION (OUTPATIENT)
Dept: CASE MANAGEMENT | Age: 78
End: 2023-10-25

## 2023-10-25 ENCOUNTER — TELEPHONE (OUTPATIENT)
Dept: OTHER | Age: 78
End: 2023-10-25

## 2023-10-25 NOTE — CARE COORDINATION
Care Transitions Follow Up Call    Patient Current Location:  Home: [address] [facility]    Care Transition Nurse contacted the patient by telephone to follow up after admission on 23. Verified name and  with patient as identifiers. Patient: Shari York  Patient : 1945   MRN: [id number]  Reason for Admission: Combined systolic and diastolic CHF, hypokalemia, unstable angina  Discharge Date: 23 RARS: Readmission Risk Score: 8.8      Needs to be reviewed by the provider   Additional needs identified to be addressed with provider: No  none             Method of communication with provider: none. Was able to contact Jone for final outreach. He stated that he was doing fine and did not have any symptoms of CHF. He said that he is wearing the Life Vest.  Reviewed upcoming appointment with Medication Management and CHF clinic. He had no further questions or concerns. Tre Goddard has returned to work and has not had any issues working. Informed of final outreach. Addressed changes since last contact:  none  Discussed follow-up appointments. If no appointment was previously scheduled, appointment scheduling offered: Yes. Follow Up  Future Appointments   Date Time Provider [address]   10/27/2023  7:30 AM [facility] MEDICATION MGMT [facility] MGMT [facility]   10/27/2023  9:00 AM ST[facility] CHF CLINIC RM 1 [facility] CHF CLI  [facility]   2023  1:20 PM HUGO Barnard CNP [facility]   2024  9:30 AM Vamsi Khan APRN - CNP [facility]         Care Transition Nurse reviewed medical action plan with patient and discussed any barriers to care and/or understanding of plan of care after discharge. Discussed appropriate site of care based on symptoms and resources available to patient including: PCP  Specialist  When to call 911. The patient agrees to contact the PCP office for questions related to their healthcare.      Patients top risk

## 2023-10-25 NOTE — TELEPHONE ENCOUNTER
Phoned pt for wk #3 follow up after initial visit to the [address] [place] [address]. Pt not at home. Dinesh Cortez He is working day shift at the [employer]. Wife answered states pt is doing very well. No acute S/S of CHF . Reminded to follow low sodium diet and fluid restriction of 2 liters. Also reminded of follow up appt on 10/27/23 at 9 am. Wife states pt might not be able to make it with his work schedule but she will have pt let us know .

## 2023-10-27 ENCOUNTER — HOSPITAL ENCOUNTER (OUTPATIENT)
Dept: PHARMACY | Age: 78
Setting detail: THERAPIES SERIES
Discharge: HOME | End: 2023-10-27
Payer: MEDICARE

## 2023-10-27 DIAGNOSIS — I48.91 ATRIAL FIBRILLATION, UNSPECIFIED TYPE (HCC): Primary | ICD-10-CM

## 2023-10-27 LAB
INR BLD: 2.6
PROTIME: 31.6 SECONDS

## 2023-10-27 PROCEDURE — 85610 PROTHROMBIN TIME: CPT

## 2023-10-27 PROCEDURE — 99211 OFF/OP EST MAY X REQ PHY/QHP: CPT

## 2023-10-27 NOTE — PROGRESS NOTES
Patient seen in person in Medication Management Service. Patient states compliant most of the time with regimen. No bleeding or thromboembolic side effects noted. No significant med or dietary changes. No significant recent illness or disease state changes. PT/INR done via POC meter per protocol. INR was therapeutic at 2.6.  (goal 2 - 3)    Warfarin regimen will be continued at current dose 5 mg Tues/Sat and 7.5 mg all other days. Will retest in 4 weeks. The patient has begun the following new medications:  Coreg 25 mg bid  Glipizide 10 mg bid  Jardiance 10 mg daily    Patient understands dosing directions and information discussed. Dosing schedule and follow up appointment given to patient. Progress note routed to referring physicians office. Patient acknowledges working in [address] [place] [address] with Pharmacist as referred by his/her physician/provider. COVID 19 screening completed. At this time patient denies symptoms, recent travel and exposure. Patient educated to screen for temperature and COVID-19 symptoms prior to coming to clinic for next appointment. They are instructed to call the clinic to reschedule if they have any symptoms. Standing order for PT/INR has been placed in preparation to transition to possible remote INR monitoring given efforts to reduce the spread of COVID-19.       For Pharmacy Admin Tracking Only    Intervention Detail: Adherence Monitorin  Total # of Interventions Recommended: 0  Total # of Interventions Accepted: 0  Time Spent (min): 20

## 2023-11-03 ENCOUNTER — CARE COORDINATION (OUTPATIENT)
Dept: CARE COORDINATION | Age: 78
End: 2023-11-03

## 2023-11-03 NOTE — CARE COORDINATION
Outreach to review care management services.  LVMM with my contact information and requested call back       Future Appointments   Date Time Provider [address]   11/24/2023  7:30 AM [facility] MEDICATION MGMT [facility] MED MGMT [facility]   12/22/2023  1:20 PM HUGO Dubon CNP [facility]   1/23/2024  9:30 AM Deepak Khan APRN - CNP [facility]

## 2023-11-07 ENCOUNTER — CARE COORDINATION (OUTPATIENT)
Dept: CARE COORDINATION | Age: 78
End: 2023-11-07

## 2023-11-07 NOTE — CARE COORDINATION
Ambulatory Care Coordination Note  2023    Patient Current Location:  Home: [address] [place] [address]  [address]     ACM contacted the patient by telephone. Verified name and  with patient as identifiers. Provided introduction to self, and explanation of the ACM role. Challenges to be reviewed by the provider   Additional needs identified to be addressed with provider: No  none               Method of communication with provider: none. ACM: Albert Hair RN    Reports he's feeling ok and back to work. Reports, wearing the Life Vest and no concerns. Reports, no changes with his breathing, no swelling. Denies symptoms of low blood sugar, doesn't test BG and he is watching his diet. Medication copays are higher, and will forward to .   CM plan; review assessments, medications,     Offered patient enrollment in the Remote Patient Monitoring (RPM) program for in-home monitoring: Patient declined. Future Appointments   Date Time Provider [address]   2023  7:30 AM [facility] MEDICATION MGMT [facility] MED [facility]   2023  1:20 PM Julius Burkitt, APRN - CNP [facility]   2024  9:30 AM Omari Khan APRN - CNP [facility]         Lab Results       None            Care Coordination Interventions    Referral from Primary Care Provider: No  Suggested Interventions and Community Resources          Goals Addressed                   This Visit's Progress     Conditions and Symptoms        I will schedule office visits, as directed by my provider. I will notify my provider of any barriers to my plan of care. I will follow my Zone Management tool to seek urgent or emergent care. I will notify my provider of any symptoms that indicate a worsening of my condition.     Barriers: lack of education  Plan for overcoming my barriers: education, care coordination support   Confidence: 9/10  Anticipated Goal Completion Date: 3/07/2024

## 2023-11-08 ENCOUNTER — CARE COORDINATION (OUTPATIENT)
Dept: CARE COORDINATION | Age: 78
End: 2023-11-08

## 2023-11-08 SDOH — ECONOMIC STABILITY: TRANSPORTATION INSECURITY
IN THE PAST 12 MONTHS, HAS LACK OF TRANSPORTATION KEPT YOU FROM MEETINGS, WORK, OR FROM GETTING THINGS NEEDED FOR DAILY LIVING?: NO

## 2023-11-08 SDOH — ECONOMIC STABILITY: INCOME INSECURITY: IN THE LAST 12 MONTHS, WAS THERE A TIME WHEN YOU WERE NOT ABLE TO PAY THE MORTGAGE OR RENT ON TIME?: NO

## 2023-11-08 SDOH — ECONOMIC STABILITY: HOUSING INSECURITY: IN THE LAST 12 MONTHS, HOW MANY PLACES HAVE YOU LIVED?: 1

## 2023-11-08 SDOH — ECONOMIC STABILITY: TRANSPORTATION INSECURITY
IN THE PAST 12 MONTHS, HAS THE LACK OF TRANSPORTATION KEPT YOU FROM MEDICAL APPOINTMENTS OR FROM GETTING MEDICATIONS?: NO

## 2023-11-08 NOTE — CARE COORDINATION
I was able to get the patient a 10,000 husam through the [facility] and [employer] for his [facility]. I was able to call Antwan Stone and give them the billing information. He will have those at zero copay the next year. I also got him Brilinta through the [place] [employer] program for no cost. The provider will have to send the company a script to mail the medication to the patient. I spoke with the patients wife to let her know what I was able to do.       [facility]   Medication Assistance  [address] [address], and [place] [address]    J) [phone] (W) [phone]

## 2023-11-10 ENCOUNTER — CARE COORDINATION (OUTPATIENT)
Dept: CARE COORDINATION | Age: 78
End: 2023-11-10

## 2023-11-10 NOTE — CARE COORDINATION
Heart Failure Education outreach Date/Time: 11/10/2023 12:44 PM    Ambulatory Care Manager (ACM) contacted the patient by telephone to perform Ambulatory Care Coordination. Verified name and  with patient as identifiers. Provided introduction to self, and explanation of the Ambulatory Care Manager's role. ACM reviewed that a Health Healthy tips for the Holiday packet has been sent to [employer]. ACM reviewed CHF zones, daily weights, fluid restriction, the importance of low sodium diet, and healthy tips packet with the patient. Instructed patient to call their Cardiologist if they have a weight gain of 3 lbs in 2 days or 5 lbs in a week. Patient reminded that there is a physician on call 24 hours a day / 7 days a week should the patient have questions or concerns. The patient verbalized understanding.

## 2023-11-13 ENCOUNTER — TELEPHONE (OUTPATIENT)
Dept: PHARMACY | Facility: CLINIC | Age: 78
End: 2023-11-13

## 2023-11-13 NOTE — TELEPHONE ENCOUNTER
[facility] CLINICAL PHARMACY: ADHERENCE REVIEW  Identified care gap per [employer]: fills at [facility] and [facility] : Statin adherence    Patient also appears to be prescribed: Diabetes(passed  adherence)    ASSESSMENT   [facility] Records claims through 10/23/2023 (Prior Year [address] = not reported; ):   Atorvastatin 40mg last filled on 23 for 30 day supply. Next refill due: 10/28/23    Prescribed si tablet/capsule daily    Per Insurer Portal:Same as above. Unsure which pharmacy patient would like. Lab Results   Component Value Date    CHOL 221 (H) 2023    TRIG 119 2023    HDL 42 2023    LDLCHOLESTEROL 155 (H) 2023    LDLCALC 91 2018     ALT   Date Value Ref Range Status   2023 17 5 - 41 U/L Final     AST   Date Value Ref Range Status   2023 25 <40 U/L Final     The ASCVD Risk score (Jason DK, et al., 2019) failed to calculate for the following reasons: The 2019 ASCVD risk score is only valid for ages 36 to 78    The patient has a prior MI or stroke diagnosis     PLAN  Per insurer report, LIS-0 - co-pays are based on tiers and patient is subject to coverage gap. The following are interventions that have been identified:   Patient overdue refilling Atorvastatin 40mg and active on home medication list.     Attempting to reach patient to review - unable to leave message. mob[employer]Teris message sent to patient. Last Visit: 9/15/23  Next Visit: 23    Ammy Fung CP.    [facility] free: [phone]     For Pharmacy Admin Tracking Only    Program: Yadira in place:  No  Gap Closed?: No   Time Spent (min): 15

## 2023-11-20 ENCOUNTER — PATIENT MESSAGE (OUTPATIENT)
Dept: PRIMARY CARE CLINIC | Age: 78
End: 2023-11-20

## 2023-11-21 ENCOUNTER — CARE COORDINATION (OUTPATIENT)
Dept: CARE COORDINATION | Age: 78
End: 2023-11-21

## 2023-11-21 NOTE — TELEPHONE ENCOUNTER
Vale Langley from [address] on rn vm re Brilinta through [employer] is no charge to pt. Contacted Sandee GRECO back, will send Brilinta 90 day supply with 3 refills along with face sheet to [facility] at fax [phone] per Moi Angulo NP's orders.  (See in media tab)

## 2023-11-21 NOTE — CARE COORDINATION
Ambulatory Care Coordination Note  2023    Patient Current Location:  Home: [address] [place] [address]  [address]     ACM contacted the patient by telephone. Verified name and  with patient as identifiers. Provided introduction to self, and explanation of the ACM role. Challenges to be reviewed by the provider   Additional needs identified to be addressed with provider: Yes / cardiology   medications- Brilinta - [place] will cover at no cost.   Fax 90 day supply with 3 refills to; [phone]               Method of communication with provider: chart routing and phone call    ACM: Manolo Jose, RN    Wife reports, he feels pretty good. Breathing is fine and pedal edema has improved. Weighs himself daily, has been stable. No s/s of low blood sugars. Reports, he is wearing the Life Vest. He is back to work. Reports he hasn't received Brilinta. This will be covered at no cost through [facility] and Me patient assistance program. Will forward this to cardiology    Offered patient enrollment in the Remote Patient Monitoring (RPM) program for in-home monitoring: Patient declined. Future Appointments   Date Time Provider [address]   2023  7:30 AM [facility] MEDICATION MGMT [facility] MED [facility]MT [facility]   2023  1:20 PM HUGO Roth CNP [facility]   2024  9:30 AM Brandee Khan APRN - CNP [facility]         Lab Results       None            Care Coordination Interventions    Referral from Primary Care Provider: No  Suggested Interventions and Community Resources  Medication Assistance Program: Completed  Zone Management Tools: Completed          Goals Addressed                   This Visit's Progress     Conditions and Symptoms   Improving     I will schedule office visits, as directed by my provider. I will notify my provider of any barriers to my plan of care. I will follow my Zone Management tool to seek urgent or emergent care.   I will notify

## 2023-11-24 ENCOUNTER — TELEPHONE (OUTPATIENT)
Dept: PHARMACY | Age: 78
End: 2023-11-24

## 2023-11-28 ENCOUNTER — TELEPHONE (OUTPATIENT)
Dept: PHARMACY | Age: 78
End: 2023-11-28

## 2023-11-28 NOTE — TELEPHONE ENCOUNTER
Patient was a no call/no show for INR appointment  on 11/24/23 . Called and spoke with Jaylon Knowles (patient's wife) who states patient must have forgotten he had scheduled an appointment.  Rescheduled patient's appointment for 12/1/23 at 2:50 PM.     Shubham Owen, PharmD  PGY1 Pharmacy Resident   [facility]

## 2023-12-01 ENCOUNTER — HOSPITAL ENCOUNTER (OUTPATIENT)
Age: 78
Discharge: HOME | End: 2023-12-01
Payer: MEDICARE

## 2023-12-01 ENCOUNTER — HOSPITAL ENCOUNTER (OUTPATIENT)
Dept: PHARMACY | Age: 78
Setting detail: THERAPIES SERIES
Discharge: HOME | End: 2023-12-01
Payer: MEDICARE

## 2023-12-01 DIAGNOSIS — I25.5 ISCHEMIC CARDIOMYOPATHY: ICD-10-CM

## 2023-12-01 DIAGNOSIS — I48.21 PERMANENT ATRIAL FIBRILLATION (HCC): Primary | ICD-10-CM

## 2023-12-01 LAB
ANION GAP SERPL CALCULATED.3IONS-SCNC: 9 MMOL/L (ref 9–17)
BUN SERPL-MCNC: 29 MG/DL (ref 8–23)
CALCIUM SERPL-MCNC: 9.3 MG/DL (ref 8.6–10.4)
CHLORIDE SERPL-SCNC: 105 MMOL/L (ref 98–107)
CO2 SERPL-SCNC: 27 MMOL/L (ref 20–31)
CREAT SERPL-MCNC: 1.3 MG/DL (ref 0.7–1.2)
GFR SERPL CREATININE-BSD FRML MDRD: 56 ML/MIN/1.73M2
GLUCOSE SERPL-MCNC: 268 MG/DL (ref 70–99)
INR BLD: 1.8
POTASSIUM SERPL-SCNC: 4.7 MMOL/L (ref 3.7–5.3)
PROTIME: 21.9 SECONDS
SODIUM SERPL-SCNC: 141 MMOL/L (ref 135–144)

## 2023-12-01 PROCEDURE — 36415 COLL VENOUS BLD VENIPUNCTURE: CPT

## 2023-12-01 PROCEDURE — 99211 OFF/OP EST MAY X REQ PHY/QHP: CPT | Performed by: PHARMACIST

## 2023-12-01 PROCEDURE — 80048 BASIC METABOLIC PNL TOTAL CA: CPT

## 2023-12-01 PROCEDURE — 85610 PROTHROMBIN TIME: CPT | Performed by: PHARMACIST

## 2023-12-01 NOTE — PROGRESS NOTES
Patient seen in person in Medication Management Service. Patient states compliant all of the time with regimen. No bleeding or thromboembolic side effects noted. No significant med changes. Patient had been out of [address] [place] [address] for over a week. Just restarted this today. Patient did have spinach, beets and onions two days in a row for Thanksgiving. Does not normally eat spinach  No significant recent illness or disease state changes. PT/INR done via POC meter per protocol. INR was subtherapeutic at 1.8.  (goal 2 - 3)    Warfarin regimen will be continued at current dose 5 mg Tues/Sat and 7.5 mg all other days. Will retest in 2 weeks. Patient understands dosing directions and information discussed. Dosing schedule and follow up appointment given to patient. Progress note routed to referring physicians office. Patient acknowledges working in [address] [place] [address] with Pharmacist as referred by his/her physician/provider. COVID 19 screening completed. At this time patient denies symptoms, recent travel and exposure. Patient educated to screen for temperature and COVID-19 symptoms prior to coming to clinic for next appointment. They are instructed to call the clinic to reschedule if they have any symptoms. Standing order for PT/INR has been placed in preparation to transition to possible remote INR monitoring given efforts to reduce the spread of COVID-19.       For Pharmacy Admin Tracking Only    Intervention Detail: Adherence Monitorin and Dose Adjustment: 1, reason: Therapy Optimization  Total # of Interventions Recommended: 2  Total # of Interventions Accepted: 2  Time Spent (min): 20    Wes Javier, Pharm D, [address] Medication Management Clinic  2023 2:25 PM

## 2023-12-04 ENCOUNTER — CARE COORDINATION (OUTPATIENT)
Dept: CARE COORDINATION | Age: 78
End: 2023-12-04

## 2023-12-04 SDOH — ECONOMIC STABILITY: FOOD INSECURITY: WITHIN THE PAST 12 MONTHS, YOU WORRIED THAT YOUR FOOD WOULD RUN OUT BEFORE YOU GOT MONEY TO BUY MORE.: NEVER TRUE

## 2023-12-04 SDOH — ECONOMIC STABILITY: FOOD INSECURITY: WITHIN THE PAST 12 MONTHS, THE FOOD YOU BOUGHT JUST DIDN'T LAST AND YOU DIDN'T HAVE MONEY TO GET MORE.: NEVER TRUE

## 2023-12-04 NOTE — CARE COORDINATION
Ambulatory Care Coordination Note  2023    Patient Current Location:  Home: [address] [place] [address]  [address]     ACM contacted the patient by telephone. Verified name and  with spouse/partner as identifiers. Provided introduction to self, and explanation of the ACM role. Challenges to be reviewed by the provider   Additional needs identified to be addressed with provider: No  none               Method of communication with provider: none. ACM: Clif Elliott RN    Talked to patient's wife, he has been feeling ok. Received Brilinta medication, through the [place] patient assistance program. Pedal edema is better. He's goes to the Medications Management at [facility]. He's wearing his Life Vest. Follows with cardiology. A1C 6.4, no symptoms of low blood sugars, doesn't test. No questions at this time,  CM plan; review assessments, ongoing education. Call in 2 weeks     Offered patient enrollment in the Remote Patient Monitoring (RPM) program for in-home monitoring: Patient declined. Lab Results       None            Care Coordination Interventions    Referral from Primary Care Provider: No  Suggested Interventions and Community Resources  Medication Assistance Program: Completed  Zone Management Tools: Completed          Goals Addressed    None         Future Appointments   Date Time Provider [address] [place] [address]   12/15/2023  1:50 PM [facility] MEDICATION MGMT [facility]   2023  1:20 PM HUGO Patricia CNP [facility]   2024  9:30 AM Viry Khan APRN - CNP [facility]   ,   Diabetes Assessment    Medic Alert ID: Yes  Meal Planning: Avoidance of concentrated sweets, Plate Method   How often do you test your blood sugar?: No Testing   Do you have barriers with adherence to non-pharmacologic self-management interventions?  (Nutrition/Exercise/Self-Monitoring): No   Have you ever had to go to the ED for symptoms of low blood sugar?: No

## 2023-12-12 ENCOUNTER — TELEPHONE (OUTPATIENT)
Dept: PRIMARY CARE CLINIC | Age: 78
End: 2023-12-12

## 2023-12-12 RX ORDER — AMOXICILLIN AND CLAVULANATE POTASSIUM 875; 125 MG/1; MG/1
1 TABLET, FILM COATED ORAL 2 TIMES DAILY
Qty: 20 TABLET | Refills: 0 | Status: SHIPPED | OUTPATIENT
Start: 2023-12-12 | End: 2023-12-22

## 2023-12-12 NOTE — TELEPHONE ENCOUNTER
Please let him know I have sent in augmentin for him.  If he does not improve he is advised to come in for evaluation

## 2023-12-12 NOTE — TELEPHONE ENCOUNTER
Patient wife called stating he is not feeling well and she would like to know if PCP can send in a antibiotic.

## 2023-12-15 ENCOUNTER — HOSPITAL ENCOUNTER (OUTPATIENT)
Dept: PHARMACY | Age: 78
Setting detail: THERAPIES SERIES
Discharge: HOME | End: 2023-12-15
Payer: MEDICARE

## 2023-12-15 DIAGNOSIS — I48.21 PERMANENT ATRIAL FIBRILLATION (HCC): Primary | ICD-10-CM

## 2023-12-15 LAB
INR BLD: 3.3
PROTIME: 39.6 SECONDS

## 2023-12-15 PROCEDURE — 85610 PROTHROMBIN TIME: CPT | Performed by: PHARMACY

## 2023-12-15 PROCEDURE — 99212 OFFICE O/P EST SF 10 MIN: CPT | Performed by: PHARMACY

## 2023-12-15 NOTE — PROGRESS NOTES
Patient seen in person in Medication Management Service. Patient states compliant all of the time with regimen. No bleeding or thromboembolic side effects noted. No significant dietary changes. Currently on Augmentin. Has a week left. Treating sinus infection. No significant disease state changes. Currently has a sinus infection. PT/INR done via POC meter per protocol. INR was supratherapeutic at 3.3.  (goal 2 - 3)    Warfarin regimen will be decreased to 5 mg Tues/Sat/Sun and 7.5 mg all other days. Will retest in 2 weeks due to patient availability. Patient understands dosing directions and information discussed. Dosing schedule and follow up appointment given to patient. Progress note routed to referring physicians office. 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 COVID 19 screening completed. At this time patient denies symptoms, recent travel and exposure. Patient educated to screen for temperature and COVID-19 symptoms prior to coming to clinic for next appointment. They are instructed to call the clinic to reschedule if they have any symptoms. Standing order for PT/INR has been placed in preparation to transition to possible remote INR monitoring given efforts to reduce the spread of COVID-19.       For Pharmacy Admin Tracking Only    Intervention Detail: Dose Adjustment: 1, reason: Therapy Optimization  Total # of Interventions Recommended: 1  Total # of Interventions Accepted: 1  Time Spent (min): [address] [facility], PharmD, [address] [place] [address]  12/15/2023  1:59 PM

## 2023-12-29 ENCOUNTER — HOSPITAL ENCOUNTER (OUTPATIENT)
Dept: PHARMACY | Age: 78
Setting detail: THERAPIES SERIES
Discharge: HOME | End: 2023-12-29
Payer: MEDICARE

## 2023-12-29 DIAGNOSIS — I48.21 PERMANENT ATRIAL FIBRILLATION (HCC): Primary | ICD-10-CM

## 2023-12-29 LAB
INR BLD: 4.5
PROTIME: 53.4 SECONDS

## 2023-12-29 PROCEDURE — 99212 OFFICE O/P EST SF 10 MIN: CPT | Performed by: PHARMACIST

## 2023-12-29 PROCEDURE — 85610 PROTHROMBIN TIME: CPT | Performed by: PHARMACIST

## 2023-12-29 NOTE — PROGRESS NOTES
Patient seen in person in Medication Management Service. Patient states compliant all of the time with regimen. No bleeding or thromboembolic side effects noted. No significant med changes. Pt did have 2 Budlight and a glass of whisky on Christmas Day and had a beer last night. States he very seldom drinks and only around the holidays   No significant recent illness or disease state changes. PT/INR done via POC meter per protocol. INR was supratherapeutic at 4.5.  (goal 2 - 3)    Pt already took warfarin today  Warfarin regimen will be held for 2 days and then 5 mg Tues/Thurs/Sun and 7.5 mg all other days. Will retest in 1 week. Patient counseled on signs/symptoms of bleeding and when to seek medical attention. Patient instructed to use extra precautions with elevated INR. Patient understands dosing directions and information discussed. Dosing schedule and follow up appointment given to patient. Progress note routed to referring physicians office. 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 COVID 19 screening completed. At this time patient denies symptoms, recent travel and exposure. Patient educated to screen for temperature and COVID-19 symptoms prior to coming to clinic for next appointment. They are instructed to call the clinic to reschedule if they have any symptoms. Standing order for PT/INR has been placed in preparation to transition to possible remote INR monitoring given efforts to reduce the spread of COVID-19.       For Pharmacy Admin Tracking Only    Intervention Detail: Dose Adjustment: 1, reason: Therapy Optimization  Total # of Interventions Recommended: 1  Total # of Interventions Accepted: 1  Time Spent (min): 20    Deborah Vera, Pharm D, [address] Medication Management [facility]  12/29/2023 1:33 PM

## 2024-01-05 ENCOUNTER — HOSPITAL ENCOUNTER (OUTPATIENT)
Age: 79
Setting detail: SPECIMEN
Discharge: HOME | End: 2024-01-05
Payer: MEDICARE

## 2024-01-05 ENCOUNTER — HOSPITAL ENCOUNTER (OUTPATIENT)
Dept: PHARMACY | Age: 79
Setting detail: THERAPIES SERIES
Discharge: HOME | End: 2024-01-05
Payer: MEDICARE

## 2024-01-05 DIAGNOSIS — Z12.5 SCREENING FOR PROSTATE CANCER: ICD-10-CM

## 2024-01-05 DIAGNOSIS — I48.91 ATRIAL FIBRILLATION, UNSPECIFIED TYPE (HCC): Primary | ICD-10-CM

## 2024-01-05 DIAGNOSIS — E11.9 TYPE 2 DIABETES MELLITUS WITHOUT COMPLICATION, UNSPECIFIED WHETHER LONG TERM INSULIN USE (HCC): ICD-10-CM

## 2024-01-05 LAB
EST. AVERAGE GLUCOSE BLD GHB EST-MCNC: 206 MG/DL
HBA1C MFR BLD: 8.8 % (ref 4–6)
INR BLD: 1.6
PROTIME: 18.6 SECONDS
PSA SERPL-MCNC: 0.5 NG/ML (ref 0–4)

## 2024-01-05 PROCEDURE — 85610 PROTHROMBIN TIME: CPT

## 2024-01-05 PROCEDURE — 36415 COLL VENOUS BLD VENIPUNCTURE: CPT

## 2024-01-05 PROCEDURE — 99211 OFF/OP EST MAY X REQ PHY/QHP: CPT

## 2024-01-05 PROCEDURE — 83036 HEMOGLOBIN GLYCOSYLATED A1C: CPT

## 2024-01-05 PROCEDURE — G0103 PSA SCREENING: HCPCS

## 2024-01-05 NOTE — PROGRESS NOTES
Patient seen in person in Medication Management Service.    Patient states compliant most of the time with regimen.    No bleeding or thromboembolic side effects noted.    No significant med or dietary changes.    No significant recent illness or disease state changes.      PT/INR done via POC meter per protocol.  INR was subtherapeutic at 1.6.  (goal 2 - 3)  INR decreased from 4.5 last week to 1.6 today  Warfarin regimen will be continued at current dose 5 mg Tues-Thurs-Sun and 7.5 mg all other days.  Will retest in 2 weeks.    Patient understands dosing directions and information discussed.  Dosing schedule and follow up appointment given to patient. Progress note routed to referring physicians office.  Patient acknowledges working in [employer] with Pharmacist as referred by his/her physician/provider.      COVID 19 screening completed.  At this time patient denies symptoms, recent travel and exposure.  Patient educated to screen for temperature and COVID-19 symptoms prior to coming to clinic for next appointment.  They are instructed to call the clinic to reschedule if they have any symptoms.      Standing order for PT/INR has been placed in preparation to transition to possible remote INR monitoring given efforts to reduce the spread of COVID-19.      For Pharmacy Admin Tracking Only    Intervention Detail: Adherence Monitorin  Total # of Interventions Recommended: 0  Total # of Interventions Accepted: 0  Time Spent (min): 20

## 2024-01-09 ENCOUNTER — CARE COORDINATION (OUTPATIENT)
Dept: CARE COORDINATION | Age: 79
End: 2024-01-09

## 2024-01-09 NOTE — CARE COORDINATION
my barriers: education, care coordination support   Confidence: 9/10  Anticipated Goal Completion Date: 3/07/2024                Future Appointments   Date Time Provider Department Center   1/19/2024  6:50 AM [facility] MEDICATION MGMT [facility] MED MGMT [facility]   1/23/2024  9:30 AM Karen Boone APRN - NP [facility]   4/26/2024 10:40 AM Uma Wise APRN - CNP [facility]   ,   Diabetes Assessment    Medic Alert ID: Yes  Meal Planning: Avoidance of concentrated sweets   How often do you test your blood sugar?: No Testing   Do you have barriers with adherence to non-pharmacologic self-management interventions? (Nutrition/Exercise/Self-Monitoring): No   Have you ever had to go to the ED for symptoms of low blood sugar?: No       Do you have hyperglycemia symptoms?: No   Do you have hypoglycemia symptoms?: No   Blood Sugar Monitoring Regimen: Not Testing        ,   Congestive Heart Failure Assessment    Are you currently restricting fluids?: No Restriction  Do you understand a low sodium diet?: Yes  Do you understand how to read food labels?: Yes  How many restaurant meals do you eat per week?: 1-2  Do you salt your food before tasting it?: No         Symptoms:     Symptom course: stable  Salt intake watch compared to last visit: stable     , and   General Assessment    Do you have any symptoms that are causing concern?: No

## 2024-01-10 ENCOUNTER — TELEPHONE (OUTPATIENT)
Dept: FAMILY MEDICINE CLINIC | Age: 79
End: 2024-01-10

## 2024-01-10 NOTE — TELEPHONE ENCOUNTER
Patients spouse called into office, notified patient's spouse of recent lab results. Meeta stated she would inform patient of these results.

## 2024-01-19 ENCOUNTER — HOSPITAL ENCOUNTER (OUTPATIENT)
Dept: PHARMACY | Age: 79
Setting detail: THERAPIES SERIES
Discharge: HOME | End: 2024-01-19
Payer: MEDICARE

## 2024-01-19 DIAGNOSIS — I48.91 ATRIAL FIBRILLATION, UNSPECIFIED TYPE (HCC): Primary | ICD-10-CM

## 2024-01-19 LAB
INR BLD: 4.7
PROTIME: 56.2 SECONDS

## 2024-01-19 PROCEDURE — 85610 PROTHROMBIN TIME: CPT

## 2024-01-19 PROCEDURE — 99212 OFFICE O/P EST SF 10 MIN: CPT

## 2024-01-19 NOTE — PROGRESS NOTES
Patient seen in person in Medication Management Service.    Patient states compliant most of the time with regimen.    No bleeding or thromboembolic side effects noted.    No significant med or dietary changes.    No significant recent illness or disease state changes.      PT/INR done via POC meter per protocol.  INR was supratherapeutic at 4.7.  (goal 2 - 3)  The patient did take the 7.5 mg dose 5x this week instead of 4x  Warfarin regimen will be held for 2 days, then resume at reduced dose of 7.5 mg MWF and 5 mg all other days.  Will retest in 1 week.    Patient understands dosing directions and information discussed.  Dosing schedule and follow up appointment given to patient. Progress note routed to referring physicians office.  Patient acknowledges working in Consult Agreement with Pharmacist as referred by his/her physician/provider.      COVID 19 screening completed.  At this time patient denies symptoms, recent travel and exposure.  Patient educated to screen for temperature and COVID-19 symptoms prior to coming to clinic for next appointment.  They are instructed to call the clinic to reschedule if they have any symptoms.      Standing order for PT/INR has been placed in preparation to transition to possible remote INR monitoring given efforts to reduce the spread of COVID-19.      For Pharmacy Admin Tracking Only    Intervention Detail: Dose Adjustment: 1, reason: Therapy De-escalation  Total # of Interventions Recommended: 1  Total # of Interventions Accepted: 1  Time Spent (min): 20

## 2024-01-26 ENCOUNTER — HOSPITAL ENCOUNTER (OUTPATIENT)
Dept: PHARMACY | Age: 79
Setting detail: THERAPIES SERIES
Discharge: HOME | End: 2024-01-26
Payer: MEDICARE

## 2024-01-26 ENCOUNTER — APPOINTMENT (OUTPATIENT)
Dept: PHARMACY | Age: 79
End: 2024-01-26
Payer: MEDICARE

## 2024-01-26 DIAGNOSIS — I48.91 ATRIAL FIBRILLATION, UNSPECIFIED TYPE (HCC): Primary | ICD-10-CM

## 2024-01-26 LAB
INR BLD: 3.2
PROTIME: 38.1 SECONDS

## 2024-01-26 PROCEDURE — 85610 PROTHROMBIN TIME: CPT

## 2024-01-26 PROCEDURE — 99212 OFFICE O/P EST SF 10 MIN: CPT

## 2024-01-26 NOTE — PROGRESS NOTES
Patient seen in person in Medication Management Service.    Patient states compliant most of the time with regimen.    No bleeding or thromboembolic side effects noted.    No significant med or dietary changes.    No significant recent illness or disease state changes.      PT/INR done via POC meter per protocol.  INR was supratherapeutic at 3.2.  (goal 2 - 3)  INR decreased from 4.7 last week to 3.2 today    Warfarin regimen will be decreased to 7.5 mg Tues/Fri and 5 mg all other days  Will retest in 2 weeks.    Patient understands dosing directions and information discussed.  Dosing schedule and follow up appointment given to patient. Progress note routed to referring physicians office.  Patient acknowledges working in Consult Agreement with Pharmacist as referred by his/her physician/provider.      COVID 19 screening completed.  At this time patient denies symptoms, recent travel and exposure.  Patient educated to screen for temperature and COVID-19 symptoms prior to coming to clinic for next appointment.  They are instructed to call the clinic to reschedule if they have any symptoms.      Standing order for PT/INR has been placed in preparation to transition to possible remote INR monitoring given efforts to reduce the spread of COVID-19.      For Pharmacy Admin Tracking Only    Intervention Detail: Dose Adjustment: 1, reason: Therapy De-escalation  Total # of Interventions Recommended: 1  Total # of Interventions Accepted: 1  Time Spent (min): 20

## 2024-02-06 RX ORDER — WARFARIN SODIUM 5 MG/1
5 TABLET ORAL SEE ADMIN INSTRUCTIONS
Qty: 60 TABLET | Refills: 3 | Status: SHIPPED | OUTPATIENT
Start: 2024-02-06

## 2024-02-08 ENCOUNTER — CARE COORDINATION (OUTPATIENT)
Dept: CARE COORDINATION | Age: 79
End: 2024-02-08

## 2024-02-08 NOTE — CARE COORDINATION
Attempted outreaches for care management. No answer, VM is full, unable to leave a messages    Follow up 1-2 weeks     Future Appointments   Date Time Provider Department Center   2/9/2024  7:50 AM [facility]   4/26/2024 10:40 AM Uma Wise, APRN - CNP [facility]

## 2024-02-09 ENCOUNTER — HOSPITAL ENCOUNTER (OUTPATIENT)
Dept: PHARMACY | Age: 79
Setting detail: THERAPIES SERIES
Discharge: HOME | End: 2024-02-09

## 2024-02-09 DIAGNOSIS — I48.91 ATRIAL FIBRILLATION, UNSPECIFIED TYPE (HCC): Primary | ICD-10-CM

## 2024-02-09 LAB
INR BLD: 1.7
PROTIME: 19.9 SECONDS

## 2024-02-09 PROCEDURE — 99212 OFFICE O/P EST SF 10 MIN: CPT

## 2024-02-09 PROCEDURE — 85610 PROTHROMBIN TIME: CPT

## 2024-02-09 NOTE — PROGRESS NOTES
Patient seen in person in Medication Management Service.    Patient states compliant most of the time with regimen.    No bleeding or thromboembolic side effects noted.    No significant med or dietary changes.    No significant recent illness or disease state changes.      PT/INR done via POC meter per protocol.  INR was subtherapeutic at 1.7.  (goal 2 - 3)  The patient has been consistently consuming more mixed salads    Warfarin regimen will be increased to 7.5 mg MWF and 5 mg all other days.  Will retest in 2 weeks.    Patient understands dosing directions and information discussed.  Dosing schedule and follow up appointment given to patient. Progress note routed to referring physicians office.  Patient acknowledges working in Consult Agreement with Pharmacist as referred by his/her physician/provider.      COVID 19 screening completed.  At this time patient denies symptoms, recent travel and exposure.  Patient educated to screen for temperature and COVID-19 symptoms prior to coming to clinic for next appointment.  They are instructed to call the clinic to reschedule if they have any symptoms.      Standing order for PT/INR has been placed in preparation to transition to possible remote INR monitoring given efforts to reduce the spread of COVID-19.      For Pharmacy Admin Tracking Only    Intervention Detail: Dose Adjustment: 1, reason: Therapy Optimization  Total # of Interventions Recommended: 1  Total # of Interventions Accepted: 1  Time Spent (min): 20

## 2024-02-23 ENCOUNTER — APPOINTMENT (OUTPATIENT)
Dept: PHARMACY | Age: 79
End: 2024-02-23

## 2024-02-23 DIAGNOSIS — I48.21 PERMANENT ATRIAL FIBRILLATION (HCC): Primary | ICD-10-CM

## 2024-02-23 LAB
INR BLD: 1.5
PROTIME: 17.7 SECONDS

## 2024-02-23 PROCEDURE — 85610 PROTHROMBIN TIME: CPT

## 2024-02-23 PROCEDURE — 99212 OFFICE O/P EST SF 10 MIN: CPT

## 2024-02-23 NOTE — PROGRESS NOTES
Patient seen in person in Medication Management Service.    Patient states compliant all of the time with regimen.    No bleeding or thromboembolic side effects noted.    Tries to eat salads, dark lettuce based salads three times a week from [employer] where he works.  Likes to use cinnamon supplement, also drinks red tea with cal. Started drinking green tea recently.     Reports diarrhea  after metformin dose increase in early January, takes before eating.  Noted to patient to make metformin with food to help with tolerability.     No significant recent illness or disease state changes.      PT/INR done via POC meter per protocol.  INR was subtherapeutic at 1.5.  (goal 2 - 3)    Warfarin regimen will be boost to 10 mg today only, then continue 7.5 mg MWF and 5 mg all other days. Patient agrees to stop drinking Green tea.     Will retest in 1 week.    Patient understands dosing directions and information discussed.  Dosing schedule and follow up appointment given to patient. Progress note routed to referring physicians office.  Patient acknowledges working in Consult Agreement with Pharmacist as referred by his/her physician/provider.      COVID 19 screening completed.  At this time patient denies symptoms, recent travel and exposure.  Patient educated to screen for temperature and COVID-19 symptoms prior to coming to clinic for next appointment.  They are instructed to call the clinic to reschedule if they have any symptoms.      Standing order for PT/INR has been placed in preparation to transition to possible remote INR monitoring given efforts to reduce the spread of COVID-19.      For Pharmacy Admin Tracking Only    Intervention Detail: Adherence Monitorin and Dose Adjustment: 1, reason: Interaction  Total # of Interventions Recommended: 3  Total # of Interventions Accepted: 3  Time Spent (min): 20  Kaylan Carolina PharmD, BCPS 2024 8:38 AM

## 2024-02-28 ENCOUNTER — CARE COORDINATION (OUTPATIENT)
Dept: CARE COORDINATION | Age: 79
End: 2024-02-28

## 2024-02-28 NOTE — CARE COORDINATION
Outreach for care management. LVMM with contact information and requested call back    Future Appointments   Date Time Provider Department Center   3/1/2024  7:10 AM [facility]   4/26/2024 10:40 AM Uma Wise, APRN - CNP [facility]

## 2024-03-01 ENCOUNTER — HOSPITAL ENCOUNTER (OUTPATIENT)
Dept: PHARMACY | Age: 79
Setting detail: THERAPIES SERIES
Discharge: HOME | End: 2024-03-01
Payer: MEDICARE

## 2024-03-01 DIAGNOSIS — I48.21 PERMANENT ATRIAL FIBRILLATION (HCC): Primary | ICD-10-CM

## 2024-03-01 LAB
INR BLD: 1.4
PROTIME: 17.1 SECONDS

## 2024-03-01 PROCEDURE — 85610 PROTHROMBIN TIME: CPT | Performed by: PHARMACIST

## 2024-03-01 PROCEDURE — 99212 OFFICE O/P EST SF 10 MIN: CPT | Performed by: PHARMACIST

## 2024-03-01 NOTE — PROGRESS NOTES
Patient seen in person in Medication Management Service.    Patient states compliant all of the time with regimen.    No bleeding or thromboembolic side effects noted.    No significant med changes.    Pt has stopped green tea. Pt had turnip greens 2 days ago. Discussed vitamin k content and patient agrees to avoid.   No significant recent illness or disease state changes.      PT/INR done via POC meter per protocol.  INR was subtherapeutic at 1.4.  (goal 2 - 3)    Pt will avoid turnip greens.  Warfarin regimen will be increased to 5 mg Sun/Tues/Thurs and 7.5 mg all other days.  Will retest in 1 week.    Patient understands dosing directions and information discussed.  Dosing schedule and follow up appointment given to patient. Progress note routed to referring physicians office.  Patient acknowledges working in Consult Agreement with Pharmacist as referred by his/her physician/provider.      COVID 19 screening completed.  At this time patient denies symptoms, recent travel and exposure.  Patient educated to screen for temperature and COVID-19 symptoms prior to coming to clinic for next appointment.  They are instructed to call the clinic to reschedule if they have any symptoms.      Standing order for PT/INR has been placed in preparation to transition to possible remote INR monitoring given efforts to reduce the spread of COVID-19.      For Pharmacy Admin Tracking Only    Intervention Detail: Adherence Monitorin and Dose Adjustment: 1, reason: Therapy Optimization  Total # of Interventions Recommended: 2  Total # of Interventions Accepted: 2  Time Spent (min): 20    Emely Berkowitz, Pharm D, [facility]  [facility]  3/1/2024 7:07 AM

## 2024-03-08 ENCOUNTER — HOSPITAL ENCOUNTER (OUTPATIENT)
Dept: PHARMACY | Age: 79
Setting detail: THERAPIES SERIES
Discharge: HOME | End: 2024-03-08
Payer: MEDICARE

## 2024-03-08 DIAGNOSIS — I48.91 ATRIAL FIBRILLATION, UNSPECIFIED TYPE (HCC): Primary | ICD-10-CM

## 2024-03-08 LAB
INR BLD: 1.9
PROTIME: 22.9 SECONDS

## 2024-03-08 PROCEDURE — 99212 OFFICE O/P EST SF 10 MIN: CPT

## 2024-03-08 PROCEDURE — 85610 PROTHROMBIN TIME: CPT

## 2024-03-08 NOTE — PROGRESS NOTES
Patient seen in person in Medication Management Service.    Patient states compliant most of the time with regimen.    No bleeding or thromboembolic side effects noted.    No significant med or dietary changes.    No significant recent illness or disease state changes.        PT/INR done via POC meter per protocol.  INR was subtherapeutic at 1.9.  (goal 2 - 3)  The patient is now taking a multivitamin that contains Vitamin K   He will be consistent taking it    Warfarin regimen will be increased to 5 mg Mon/Thurs and 7.5 mg all other days.  Will retest in 2 weeks.    Patient understands dosing directions and information discussed.  Dosing schedule and follow up appointment given to patient. Progress note routed to referring physicians office.  Patient acknowledges working in Consult Agreement with Pharmacist as referred by his/her physician/provider.      For Pharmacy Admin Tracking Only    Intervention Detail: Adherence Monitorin and Dose Adjustment: 1, reason: Therapy Optimization  Total # of Interventions Recommended: 1  Total # of Interventions Accepted: 1  Time Spent (min): 20

## 2024-03-22 ENCOUNTER — CARE COORDINATION (OUTPATIENT)
Dept: CARE COORDINATION | Age: 79
End: 2024-03-22

## 2024-03-22 ENCOUNTER — HOSPITAL ENCOUNTER (OUTPATIENT)
Dept: PHARMACY | Age: 79
Setting detail: THERAPIES SERIES
Discharge: HOME | End: 2024-03-22
Payer: MEDICARE

## 2024-03-22 DIAGNOSIS — I48.21 PERMANENT ATRIAL FIBRILLATION (HCC): Primary | ICD-10-CM

## 2024-03-22 LAB
INR BLD: 1.9
PROTIME: 22.9 SECONDS

## 2024-03-22 PROCEDURE — 85610 PROTHROMBIN TIME: CPT | Performed by: PHARMACIST

## 2024-03-22 PROCEDURE — 99212 OFFICE O/P EST SF 10 MIN: CPT | Performed by: PHARMACIST

## 2024-03-22 NOTE — PROGRESS NOTES
Patient seen in person in Medication Management Service.    Patient states compliant all of the time with regimen.    No bleeding or thromboembolic side effects noted.    No significant dietary changes. Patient did have a small serving of broccoli a couple days ago. Patient continues to take MVI with vitamin k.   No significant recent illness or disease state changes.      Patient acknowledges they are still an active patient of referring provider which is Uma Wise Yes     PT/INR done via POC meter per protocol.  INR was subtherapeutic at 1.9.  (goal 2 - 3)    Warfarin regimen will be increased to 5 mg Thurs and 7.5 mg all other days.  Will retest in 2 weeks.    Patient understands dosing directions and information discussed.  Dosing schedule and follow up appointment given to patient. Progress note routed to referring physicians office.  Patient acknowledges working in Consult Agreement with Pharmacist as referred by his/her physician/provider.      For Pharmacy Admin Tracking Only    Intervention Detail: Dose Adjustment: 1, reason: Therapy Optimization  Total # of Interventions Recommended: 1  Total # of Interventions Accepted: 1  Time Spent (min): 20     Emely Berkowitz, Pharm D, BCPS, CACP  [facility]  3/22/2024 11:15 AM

## 2024-03-25 ENCOUNTER — APPOINTMENT (OUTPATIENT)
Dept: PHARMACY | Age: 79
End: 2024-03-25
Payer: MEDICARE

## 2024-04-03 ENCOUNTER — CARE COORDINATION (OUTPATIENT)
Dept: CARE COORDINATION | Age: 79
End: 2024-04-03

## 2024-04-03 NOTE — CARE COORDINATION
Attempted outreaches for care management. No answer, unable to contact  Episode resolved       Future Appointments   Date Time Provider Department Center   4/5/2024  2:50 PM [facility]   4/26/2024 10:40 AM Uma Wise, APRN - CNP [facility]

## 2024-04-05 ENCOUNTER — HOSPITAL ENCOUNTER (OUTPATIENT)
Dept: PHARMACY | Age: 79
Setting detail: THERAPIES SERIES
Discharge: HOME | End: 2024-04-05
Payer: COMMERCIAL

## 2024-04-05 DIAGNOSIS — I48.91 ATRIAL FIBRILLATION, UNSPECIFIED TYPE (HCC): Primary | ICD-10-CM

## 2024-04-05 LAB
INR BLD: 2.4
PROTIME: 28.8 SECONDS

## 2024-04-05 PROCEDURE — 85610 PROTHROMBIN TIME: CPT

## 2024-04-05 PROCEDURE — 99211 OFF/OP EST MAY X REQ PHY/QHP: CPT

## 2024-04-05 NOTE — PROGRESS NOTES
Patient seen in person in Medication Management Service.    Patient states compliant most of the time with regimen.    No bleeding or thromboembolic side effects noted.    No significant med or dietary changes.    No significant recent illness or disease state changes.      Patient acknowledges they are still an active patient of referring provider which is Uma Wise Yes     PT/INR done via POC meter per protocol.  INR was therapeutic at 2.4.  (goal 2 - 3)    Warfarin regimen will be continued at current dose 5 mg Sun and 7.5 mg all other days.  Will retest in 4 weeks.    Patient understands dosing directions and information discussed.  Dosing schedule and follow up appointment given to patient. Progress note routed to referring physicians office.  Patient acknowledges working in Consult Agreement with Pharmacist as referred by his/her physician/provider.      For Pharmacy Admin Tracking Only    Intervention Detail: Adherence Monitorin  Total # of Interventions Recommended: 0  Total # of Interventions Accepted: 0  Time Spent (min): 20

## 2024-04-26 ENCOUNTER — OFFICE VISIT (OUTPATIENT)
Dept: PRIMARY CARE CLINIC | Age: 79
End: 2024-04-26
Payer: COMMERCIAL

## 2024-04-26 ENCOUNTER — TELEPHONE (OUTPATIENT)
Dept: PRIMARY CARE CLINIC | Age: 79
End: 2024-04-26

## 2024-04-26 VITALS
OXYGEN SATURATION: 98 % | SYSTOLIC BLOOD PRESSURE: 154 MMHG | HEART RATE: 55 BPM | BODY MASS INDEX: 24.2 KG/M2 | HEIGHT: 70 IN | WEIGHT: 169 LBS | DIASTOLIC BLOOD PRESSURE: 76 MMHG

## 2024-04-26 DIAGNOSIS — I50.42 CHRONIC COMBINED SYSTOLIC AND DIASTOLIC HEART FAILURE (HCC): ICD-10-CM

## 2024-04-26 DIAGNOSIS — I10 ESSENTIAL HYPERTENSION: Chronic | ICD-10-CM

## 2024-04-26 DIAGNOSIS — E11.9 TYPE 2 DIABETES MELLITUS WITHOUT COMPLICATION, UNSPECIFIED WHETHER LONG TERM INSULIN USE (HCC): Primary | ICD-10-CM

## 2024-04-26 DIAGNOSIS — I25.2 HISTORY OF MI (MYOCARDIAL INFARCTION): ICD-10-CM

## 2024-04-26 DIAGNOSIS — I48.11 LONGSTANDING PERSISTENT ATRIAL FIBRILLATION (HCC): ICD-10-CM

## 2024-04-26 DIAGNOSIS — E78.2 MIXED HYPERLIPIDEMIA: Chronic | ICD-10-CM

## 2024-04-26 DIAGNOSIS — I25.10 CORONARY ARTERY DISEASE INVOLVING NATIVE CORONARY ARTERY OF NATIVE HEART WITHOUT ANGINA PECTORIS: ICD-10-CM

## 2024-04-26 PROBLEM — I48.91 ATRIAL FIBRILLATION (HCC): Status: RESOLVED | Noted: 2020-01-23 | Resolved: 2024-04-26

## 2024-04-26 PROBLEM — I50.43 ACUTE ON CHRONIC COMBINED SYSTOLIC (CONGESTIVE) AND DIASTOLIC (CONGESTIVE) HEART FAILURE (HCC): Status: RESOLVED | Noted: 2020-06-11 | Resolved: 2024-04-26

## 2024-04-26 PROBLEM — I21.4 NSTEMI (NON-ST ELEVATED MYOCARDIAL INFARCTION) (HCC): Status: RESOLVED | Noted: 2023-09-26 | Resolved: 2024-04-26

## 2024-04-26 LAB — HBA1C MFR BLD: 9.3 %

## 2024-04-26 PROCEDURE — 3046F HEMOGLOBIN A1C LEVEL >9.0%: CPT | Performed by: NURSE PRACTITIONER

## 2024-04-26 PROCEDURE — 1123F ACP DISCUSS/DSCN MKR DOCD: CPT | Performed by: NURSE PRACTITIONER

## 2024-04-26 PROCEDURE — 83036 HEMOGLOBIN GLYCOSYLATED A1C: CPT | Performed by: NURSE PRACTITIONER

## 2024-04-26 PROCEDURE — 3078F DIAST BP <80 MM HG: CPT | Performed by: NURSE PRACTITIONER

## 2024-04-26 PROCEDURE — 3077F SYST BP >= 140 MM HG: CPT | Performed by: NURSE PRACTITIONER

## 2024-04-26 PROCEDURE — 99214 OFFICE O/P EST MOD 30 MIN: CPT | Performed by: NURSE PRACTITIONER

## 2024-04-26 RX ORDER — SEMAGLUTIDE 0.68 MG/ML
INJECTION, SOLUTION SUBCUTANEOUS
Qty: 9 ML | Refills: 1 | Status: SHIPPED | OUTPATIENT
Start: 2024-04-26 | End: 2024-04-26

## 2024-04-26 SDOH — ECONOMIC STABILITY: FOOD INSECURITY: WITHIN THE PAST 12 MONTHS, YOU WORRIED THAT YOUR FOOD WOULD RUN OUT BEFORE YOU GOT MONEY TO BUY MORE.: NEVER TRUE

## 2024-04-26 SDOH — ECONOMIC STABILITY: FOOD INSECURITY: WITHIN THE PAST 12 MONTHS, THE FOOD YOU BOUGHT JUST DIDN'T LAST AND YOU DIDN'T HAVE MONEY TO GET MORE.: NEVER TRUE

## 2024-04-26 SDOH — ECONOMIC STABILITY: INCOME INSECURITY: HOW HARD IS IT FOR YOU TO PAY FOR THE VERY BASICS LIKE FOOD, HOUSING, MEDICAL CARE, AND HEATING?: NOT HARD AT ALL

## 2024-04-26 ASSESSMENT — PATIENT HEALTH QUESTIONNAIRE - PHQ9
SUM OF ALL RESPONSES TO PHQ QUESTIONS 1-9: 0
SUM OF ALL RESPONSES TO PHQ QUESTIONS 1-9: 0
1. LITTLE INTEREST OR PLEASURE IN DOING THINGS: NOT AT ALL
SUM OF ALL RESPONSES TO PHQ9 QUESTIONS 1 & 2: 0
SUM OF ALL RESPONSES TO PHQ QUESTIONS 1-9: 0
2. FEELING DOWN, DEPRESSED OR HOPELESS: NOT AT ALL
SUM OF ALL RESPONSES TO PHQ QUESTIONS 1-9: 0

## 2024-04-26 ASSESSMENT — ENCOUNTER SYMPTOMS
ABDOMINAL PAIN: 0
DIARRHEA: 0
NAUSEA: 0
SHORTNESS OF BREATH: 0
VOMITING: 0
COUGH: 0
TROUBLE SWALLOWING: 0
SINUS PRESSURE: 0
BLOOD IN STOOL: 0
WHEEZING: 0
CONSTIPATION: 0
SORE THROAT: 0

## 2024-04-26 NOTE — TELEPHONE ENCOUNTER
Patient's wife Meeta called stating patient's Ozempic is going to cost $330 which will put him in the \"donut hole\"    She states she was told to call the office if there were issues with filling the medication

## 2024-04-26 NOTE — TELEPHONE ENCOUNTER
When he was in for appointment today he mentioned that he had a large book of covered medications from his [employer] insurance.  Please ask if they would be able to drop that off or send copies of the list of diabetic medications that are covered to this office.  I can then choose an appropriate alternative from that list

## 2024-04-26 NOTE — PROGRESS NOTES
[facility]  [facility]  [address] [place] [address] DR  [address]  [facility] [address]  Dept: [phone]  Dept Fax: [phone]    Jone Jacobs is a 79 y.o. male who presentstoday for his medical conditions/complaints as noted below.  Jone Jacobs is c/o of  Chief Complaint   Patient presents with    Diabetes    Hypertension    Discuss Medications     Having diarrhea from metformin he believes         HPI:     Here today for follow up  Since his last visit he increased metformin to 1000 mg but found it gave him diarrhea so reduced dosage back to the 500 mg twice daily  Claims he has been working on diet but A1c has significantly elevated, reports \"strawberry waffles for breakfast\"  He has otherwise been stable  States only seeing cardiology annually now  He continues to go to Coumadin clinic on regular basis      Diabetes  He presents for his follow-up diabetic visit. He has type 2 diabetes mellitus. His disease course has been worsening. There are no hypoglycemic associated symptoms. Pertinent negatives for hypoglycemia include no confusion, dizziness, headaches or nervousness/anxiousness. Pertinent negatives for diabetes include no chest pain, no fatigue, no polydipsia, no polyphagia, no polyuria and no weakness. Pertinent negatives for diabetic complications include no peripheral neuropathy. Current diabetic treatment includes diet and oral agent (triple therapy). He is compliant with treatment some of the time. His weight is stable. He is following a generally healthy diet. He participates in exercise intermittently. His home blood glucose trend is increasing steadily. An ACE inhibitor/angiotensin II receptor blocker is being taken.       Hemoglobin A1C (%)   Date Value   04/26/2024 9.3   01/05/2024 8.8 (H)   09/25/2023 6.0             ( goal A1C is < 7)   No components found for: \"LABMICR\"  LDL Cholesterol (mg/dL)   Date Value   09/25/2023 155 (H)   07/27/2017 96   05/19/2016 196 (H)

## 2024-04-30 RX ORDER — DAPAGLIFLOZIN 10 MG/1
10 TABLET, FILM COATED ORAL EVERY MORNING
Qty: 30 TABLET | Refills: 5 | Status: SHIPPED | OUTPATIENT
Start: 2024-04-30

## 2024-04-30 RX ORDER — ATORVASTATIN CALCIUM 40 MG/1
40 TABLET, FILM COATED ORAL NIGHTLY
Qty: 30 TABLET | Refills: 5 | Status: SHIPPED | OUTPATIENT
Start: 2024-04-30

## 2024-05-01 NOTE — TELEPHONE ENCOUNTER
PA started on epic for Dapagliflozin with this encounter    Last Visit Date: 4/26/2024   Next Visit Date: 7/26/2024

## 2024-05-03 ENCOUNTER — HOSPITAL ENCOUNTER (OUTPATIENT)
Dept: PHARMACY | Age: 79
Setting detail: THERAPIES SERIES
Discharge: HOME | End: 2024-05-03
Payer: COMMERCIAL

## 2024-05-03 LAB
INR BLD: 3.2
PROTIME: 37.8 SECONDS

## 2024-05-03 PROCEDURE — 85610 PROTHROMBIN TIME: CPT | Performed by: PHARMACIST

## 2024-05-03 PROCEDURE — 99211 OFF/OP EST MAY X REQ PHY/QHP: CPT | Performed by: PHARMACIST

## 2024-05-03 NOTE — PROGRESS NOTES
Patient seen in person in Medication Management Service.    Patient states compliant all of the time with regimen.    No bleeding or thromboembolic side effects noted.    No significant med or dietary changes.  Patient states he was instruction to start Ozempic, but he was unable to afford the medication. Has been in contact with PCP and waiting on alternative.   Patient takes a MVI with vitamin k. States he never has taken the MVI on days he works. This past week he was off of work and did not take it at all.   No significant recent illness or disease state changes.      Patient acknowledges they are still an active patient of referring provider which is Uma Wise Yes     PT/INR done via POC meter per protocol.  INR was supratherapeutic at 3.2.  (goal 2 - 3)    Warfarin regimen will be continued at current dose 5 mg Sun and 7.5 mg all other days as patient will resume taking MVI with vitamin k.  Will retest in 2 weeks.    Patient understands dosing directions and information discussed.  Dosing schedule and follow up appointment given to patient. Progress note routed to referring physicians office.  Patient acknowledges working in Consult Agreement with Pharmacist as referred by his/her physician/provider.      For Pharmacy Admin Tracking Only    Intervention Detail: Adherence Monitorin  Total # of Interventions Recommended: 1  Total # of Interventions Accepted: 1  Time Spent (min): 20       Emely Berkowitz, Pharm D, BCPS, CACP  [facility]  5/3/2024 9:04 AM

## 2024-05-17 ENCOUNTER — HOSPITAL ENCOUNTER (OUTPATIENT)
Dept: PHARMACY | Age: 79
Setting detail: THERAPIES SERIES
Discharge: HOME | End: 2024-05-17
Payer: COMMERCIAL

## 2024-05-17 LAB
INR BLD: 3.3
PROTIME: 39 SECONDS

## 2024-05-17 PROCEDURE — 99212 OFFICE O/P EST SF 10 MIN: CPT

## 2024-05-17 PROCEDURE — 85610 PROTHROMBIN TIME: CPT

## 2024-05-17 NOTE — PROGRESS NOTES
Patient seen in person in Medication Management Service.    Patient states compliant all of the time with regimen.    No bleeding or thromboembolic side effects noted.    No significant med or dietary changes.  Patient uses multivitamins with vit K , only takes on day he works and has been using in the last two weeks.   No significant recent illness or disease state changes.      Patient acknowledges they are still an active patient of referring provider which is Uma Wise APRN  Yes     PT/INR done via POC meter per protocol.  INR was supratherapeutic at   3.3.  (goal 2 - 3)    Warfarin regimen will be decreased to 5 mg on Tues and Sat and 7.5 mg all other days.  Will retest in 2 weeks.    Patient understands dosing directions and information discussed.  Dosing schedule and follow up appointment given to patient. Progress note routed to referring physicians office.  Patient acknowledges working in Consult Agreement with Pharmacist as referred by his/her physician/provider.      For Pharmacy Admin Tracking Only    For Pharmacy Admin Tracking Only    Intervention Detail: Adherence Monitorin and Dose Adjustment: 1, reason: Therapy Optimization  Total # of Interventions Recommended: 2  Total # of Interventions Accepted: 2  Time Spent (min): 20    Kaylan Carolina PharmD, BCPS 2024 10:17 AM

## 2024-06-07 ENCOUNTER — HOSPITAL ENCOUNTER (OUTPATIENT)
Dept: PHARMACY | Age: 79
Setting detail: THERAPIES SERIES
Discharge: HOME | End: 2024-06-07
Payer: COMMERCIAL

## 2024-06-07 LAB
INR BLD: 2.1
PROTIME: 25.4 SECONDS

## 2024-06-07 PROCEDURE — 99211 OFF/OP EST MAY X REQ PHY/QHP: CPT | Performed by: PHARMACIST

## 2024-06-07 PROCEDURE — 85610 PROTHROMBIN TIME: CPT | Performed by: PHARMACIST

## 2024-06-07 NOTE — PROGRESS NOTES
Patient seen in person in Medication Management Service.    Patient states compliant all of the time with regimen.    No bleeding or thromboembolic side effects noted.    No significant med or dietary changes.    No significant recent illness or disease state changes.      Patient acknowledges they are still an active patient of referring provider which is Uma Wise CNP Yes       PT/INR done via POC meter per protocol.  INR was therapeutic at 2.1.  (goal 2 - 3)    Warfarin regimen will be continued at current dose 5 mg Tues/Sat and 7.5 mg all other days.  Will retest in 4 weeks.    Patient understands dosing directions and information discussed.  Dosing schedule and follow up appointment given to patient. Progress note routed to referring physicians office.  Patient acknowledges working in Consult Agreement with Pharmacist as referred by his/her physician/provider.      For Pharmacy Admin Tracking Only    Intervention Detail:   Total # of Interventions Recommended: 0  Total # of Interventions Accepted: 0  Time Spent (min): 20

## 2024-07-05 ENCOUNTER — TELEPHONE (OUTPATIENT)
Dept: PHARMACY | Age: 79
End: 2024-07-05

## 2024-07-28 RX ORDER — WARFARIN SODIUM 5 MG/1
TABLET ORAL
Qty: 60 TABLET | Refills: 5 | Status: SHIPPED | OUTPATIENT
Start: 2024-07-28

## 2024-07-30 NOTE — TELEPHONE ENCOUNTER
Phoned patient at home, discharged from [facility] yesterday. Checking of patient status and reminded of initial appt 10/4/2023. Left message on voicemail.

## 2024-07-31 ENCOUNTER — TELEPHONE (OUTPATIENT)
Dept: PRIMARY CARE CLINIC | Age: 79
End: 2024-07-31

## 2024-07-31 RX ORDER — WARFARIN SODIUM 5 MG/1
5 TABLET ORAL DAILY
Qty: 90 TABLET | Refills: 5 | Status: SHIPPED | OUTPATIENT
Start: 2024-07-31

## 2024-07-31 NOTE — TELEPHONE ENCOUNTER
Pharmacy called into office asking for clarification on warfarin rx. Caller states in order to bill properly that are needing directions. (How much in a day, how long the medication is supposed to last).

## 2024-08-01 RX ORDER — ATORVASTATIN CALCIUM 40 MG/1
40 TABLET, FILM COATED ORAL NIGHTLY
Qty: 90 TABLET | Refills: 3 | Status: SHIPPED | OUTPATIENT
Start: 2024-08-01

## 2024-08-16 ENCOUNTER — ANTI-COAG VISIT (OUTPATIENT)
Age: 79
End: 2024-08-16
Payer: COMMERCIAL

## 2024-08-16 LAB
INR BLD: 2.6
PROTIME: 31.5 SECONDS

## 2024-08-16 PROCEDURE — 85610 PROTHROMBIN TIME: CPT

## 2024-08-16 PROCEDURE — 99211 OFF/OP EST MAY X REQ PHY/QHP: CPT

## 2024-08-16 NOTE — PROGRESS NOTES
Patient seen in person in Medication Management Service.    Patient states compliant most of the time with regimen.    No bleeding or thromboembolic side effects noted.    No significant med or dietary changes.    No significant recent illness or disease state changes.      Patient acknowledges they are still an active patient of referring provider which is Uma Wise CNP Yes     PT/INR done via POC meter per protocol.  INR was therapeutic at 2.6.  (goal 2 - 3)    Warfarin regimen will be continued at current dose 5 mg Tues/Sat and 7.5 mg all other days.  Will retest in 4 weeks.    Patient understands dosing directions and information discussed.  Dosing schedule and follow up appointment given to patient. Progress note routed to referring physicians office.  Patient acknowledges working in Consult Agreement with Pharmacist as referred by his/her physician/provider.      For Pharmacy Admin Tracking Only    Intervention Detail: Adherence Monitorin  Total # of Interventions Recommended: 1  Total # of Interventions Accepted: 1  Time Spent (min): 20

## 2024-08-26 NOTE — TELEPHONE ENCOUNTER
New Referral for Warfarin Management  Called to set up first appt  Called the following numbers  [phone]  No Answer Vm not set up  [phone]   No Answer  VM not set up  Emergency Contact (Spouse) No Answer    Called office of Darya Connor   These are the phone numbers that they have also
"My thoughts are blocked"

## 2024-09-13 ENCOUNTER — ANTI-COAG VISIT (OUTPATIENT)
Age: 79
End: 2024-09-13
Payer: COMMERCIAL

## 2024-09-13 LAB
INTERNATIONAL NORMALIZATION RATIO, POC: 3.9
PROTHROMBIN TIME, POC: 46.2

## 2024-09-13 PROCEDURE — 85610 PROTHROMBIN TIME: CPT

## 2024-09-13 PROCEDURE — 99212 OFFICE O/P EST SF 10 MIN: CPT

## 2024-09-13 RX ORDER — ACETAMINOPHEN 500 MG
500 TABLET ORAL DAILY
COMMUNITY

## 2024-09-20 ENCOUNTER — ANTI-COAG VISIT (OUTPATIENT)
Age: 79
End: 2024-09-20
Payer: COMMERCIAL

## 2024-09-20 LAB
INTERNATIONAL NORMALIZATION RATIO, POC: 3.7
PROTHROMBIN TIME, POC: 44.2

## 2024-09-20 PROCEDURE — 85610 PROTHROMBIN TIME: CPT | Performed by: PHARMACY

## 2024-09-20 PROCEDURE — 99212 OFFICE O/P EST SF 10 MIN: CPT | Performed by: PHARMACY

## 2024-09-20 RX ORDER — CARVEDILOL 25 MG/1
TABLET ORAL
Qty: 180 TABLET | Refills: 0 | Status: SHIPPED | OUTPATIENT
Start: 2024-09-20

## 2024-09-27 ENCOUNTER — ANTI-COAG VISIT (OUTPATIENT)
Age: 79
End: 2024-09-27
Payer: COMMERCIAL

## 2024-09-27 LAB
INTERNATIONAL NORMALIZATION RATIO, POC: 3
PROTHROMBIN TIME, POC: 35.9

## 2024-09-27 PROCEDURE — 99211 OFF/OP EST MAY X REQ PHY/QHP: CPT | Performed by: PHARMACY

## 2024-09-27 PROCEDURE — 85610 PROTHROMBIN TIME: CPT | Performed by: PHARMACY

## 2024-10-11 ENCOUNTER — ANTI-COAG VISIT (OUTPATIENT)
Age: 79
End: 2024-10-11
Payer: COMMERCIAL

## 2024-10-11 LAB
INTERNATIONAL NORMALIZATION RATIO, POC: 2.6 (ref 2–3)
PROTHROMBIN TIME, POC: 30.9

## 2024-10-11 PROCEDURE — 85610 PROTHROMBIN TIME: CPT

## 2024-10-11 PROCEDURE — 99211 OFF/OP EST MAY X REQ PHY/QHP: CPT

## 2024-10-11 NOTE — PROGRESS NOTES
Patient seen in person in Medication Management Service.    Patient states compliant most of the time with regimen.    No bleeding or thromboembolic side effects noted.    No significant med or dietary changes.    No significant recent illness or disease state changes.      Patient acknowledges they are still an active patient of referring provider which is Uma Wise CNP Yes     PT/INR done via POC meter per protocol.  INR was therapeutic at 2.6.  (goal 2 - 3)    Warfarin regimen will be continued at current dose 5 mg Tues-Thurs-Sat and 7.5 mg all other days.  Will retest in 4 weeks.    Patient understands dosing directions and information discussed.  Dosing schedule and follow up appointment given to patient. Progress note routed to referring physicians office.  Patient acknowledges working in Consult Agreement with Pharmacist as referred by his/her physician/provider.      For Pharmacy Admin Tracking Only    Intervention Detail: Adherence Monitorin  Total # of Interventions Recommended: 0  Total # of Interventions Accepted: 0  Time Spent (min): 20

## 2024-10-29 ENCOUNTER — TELEPHONE (OUTPATIENT)
Dept: PRIMARY CARE CLINIC | Age: 79
End: 2024-10-29

## 2024-10-29 DIAGNOSIS — I50.42 CHRONIC COMBINED SYSTOLIC AND DIASTOLIC HEART FAILURE (HCC): Primary | ICD-10-CM

## 2024-10-29 NOTE — TELEPHONE ENCOUNTER
Patient's wife is requesting a handicap placard.    Patient would like to  the script on Friday while in office for a flu shot.

## 2024-11-01 ENCOUNTER — NURSE ONLY (OUTPATIENT)
Dept: PRIMARY CARE CLINIC | Age: 79
End: 2024-11-01
Payer: COMMERCIAL

## 2024-11-01 DIAGNOSIS — Z23 NEED FOR INFLUENZA VACCINATION: Primary | ICD-10-CM

## 2024-11-01 PROCEDURE — 90653 IIV ADJUVANT VACCINE IM: CPT | Performed by: NURSE PRACTITIONER

## 2024-11-01 PROCEDURE — G0008 ADMIN INFLUENZA VIRUS VAC: HCPCS | Performed by: NURSE PRACTITIONER

## 2024-11-01 NOTE — PROGRESS NOTES
Vaccine Information Sheet, \"Influenza - Inactivated\"  given to Jone Jacobs, or parent/legal guardian of  Jone Jacobs and verbalized understanding.   Injection was given in {Injection site:29602} by Bernie Dobbs MA.     Patient responses:  Have you ever had a reaction to a flu vaccine? {YES / NO:19727}  Are you able to eat eggs without adverse effects?  {YES / NO:19727}  Do you have any current illness?  {YES / NO:19727}  Have you ever had Guillian Marion Syndrome?  {YES / NO:19727}    Flu vaccine given per order. Please see immunization tab.  Patient tolerated injection & left the office a few minutes later feeling well.          English

## 2024-11-01 NOTE — PROGRESS NOTES
Vaccine Information Sheet, \"Influenza - Inactivated\"  given to Jone Jacobs, or parent/legal guardian of  Jone Jacobs and verbalized understanding.    Patient responses:    Have you ever had a reaction to a flu vaccine? No  Are you able to eat eggs without adverse effects?  Yes  Do you have any current illness?  No  Have you ever had Guillian Melba Syndrome?  No    Flu vaccine given per order. Please see immunization tab.

## 2024-11-04 ENCOUNTER — TELEPHONE (OUTPATIENT)
Age: 79
End: 2024-11-04

## 2024-11-04 RX ORDER — WARFARIN SODIUM 5 MG/1
TABLET ORAL
Qty: 122 TABLET | Refills: 3 | Status: SHIPPED | OUTPATIENT
Start: 2024-11-04

## 2024-11-04 RX ORDER — DAPAGLIFLOZIN 10 MG/1
10 TABLET, FILM COATED ORAL EVERY MORNING
Qty: 30 TABLET | Refills: 5 | Status: SHIPPED | OUTPATIENT
Start: 2024-11-04

## 2024-11-04 RX ORDER — WARFARIN SODIUM 5 MG/1
TABLET ORAL
Qty: 45 TABLET | Refills: 3 | Status: SHIPPED | OUTPATIENT
Start: 2024-11-04

## 2024-11-04 NOTE — TELEPHONE ENCOUNTER
Last OV:  4/26/2024    Next OV: Visit date not found    Pharmacy:   [id number] - [place], [place] - [address] [phone] - F [phone]  [address] [address]  Phone: [phone] Fax: [phone]       Confirmed? Yes

## 2024-11-04 NOTE — TELEPHONE ENCOUNTER
Patient's spouse called requesting refill of warfarin to be sent to [facility] on [address], as patient has been without medication for a few days.     Warfarin 5 mg tablets sent to [facility].

## 2024-11-08 ENCOUNTER — ANTI-COAG VISIT (OUTPATIENT)
Age: 79
End: 2024-11-08
Payer: COMMERCIAL

## 2024-11-08 LAB
INTERNATIONAL NORMALIZATION RATIO, POC: 3.1 (ref 2–3)
PROTHROMBIN TIME, POC: 37.5

## 2024-11-08 PROCEDURE — 99212 OFFICE O/P EST SF 10 MIN: CPT

## 2024-11-08 PROCEDURE — 85610 PROTHROMBIN TIME: CPT

## 2024-11-08 NOTE — PROGRESS NOTES
Patient seen in person in Medication Management Service.    Patient states compliant most of the time with regimen.    No bleeding or thromboembolic side effects noted.    No significant med or dietary changes.    No significant recent illness or disease state changes.      Patient acknowledges they are still an active patient of referring provider which is Uma Wise CNP Yes     PT/INR done via POC meter per protocol.  INR was supratherapeutic at 3.1.  (goal 2 - 3)  The last INR was 2.6 with no change made in the warfarin dose  The patient did consume a beer yesterday which is unusual for him    Warfarin regimen will be continued at current dose 5 mg Tues-Thurs-Sat and 7.5 mg all other days.  Will retest in 3 weeks.    Patient understands dosing directions and information discussed.  Dosing schedule and follow up appointment given to patient. Progress note routed to referring physicians office.  Patient acknowledges working in Consult Agreement with Pharmacist as referred by his/her physician/provider.      For Pharmacy Admin Tracking Only    Intervention Detail: Adherence Monitorin  Total # of Interventions Recommended: 0  Total # of Interventions Accepted: 0  Time Spent (min): 20

## 2024-11-29 ENCOUNTER — ANTI-COAG VISIT (OUTPATIENT)
Age: 79
End: 2024-11-29
Payer: COMMERCIAL

## 2024-11-29 LAB
INTERNATIONAL NORMALIZATION RATIO, POC: 2.8 (ref 2–3)
PROTHROMBIN TIME, POC: 33.5

## 2024-11-29 PROCEDURE — 99211 OFF/OP EST MAY X REQ PHY/QHP: CPT

## 2024-11-29 PROCEDURE — 85610 PROTHROMBIN TIME: CPT

## 2024-11-29 NOTE — PROGRESS NOTES
Patient seen in person in Medication Management Service.    Patient states compliant most of the time with regimen.    No bleeding or thromboembolic side effects noted.    No significant med or dietary changes.    No significant recent illness or disease state changes.      Patient acknowledges they are still an active patient of referring provider which is Uma Wise CNP Yes     PT/INR done via POC meter per protocol.  INR was supratherapeutic at 2.8.  (goal 2 - 3)    Warfarin regimen will be continued at current dose 5 mg tues-Thurs-Sat and 7.5 mg all other days.  Will retest in 4 weeks.    Patient understands dosing directions and information discussed.  Dosing schedule and follow up appointment given to patient. Progress note routed to referring physicians office.  Patient acknowledges working in Consult Agreement with Pharmacist as referred by his/her physician/provider.      For Pharmacy Admin Tracking Only    Intervention Detail: Adherence Monitorin  Total # of Interventions Recommended: 0  Total # of Interventions Accepted: 0  Time Spent (min): 20

## 2024-12-27 ENCOUNTER — ANTI-COAG VISIT (OUTPATIENT)
Age: 79
End: 2024-12-27
Payer: COMMERCIAL

## 2024-12-27 LAB
INTERNATIONAL NORMALIZATION RATIO, POC: 3.4 (ref 2–3)
PROTHROMBIN TIME, POC: 40.6

## 2024-12-27 PROCEDURE — 85610 PROTHROMBIN TIME: CPT

## 2024-12-27 PROCEDURE — 99211 OFF/OP EST MAY X REQ PHY/QHP: CPT

## 2024-12-27 NOTE — PROGRESS NOTES
Patient seen in person in Medication Management Service.    Patient states compliant most of the time with regimen.    No bleeding or thromboembolic side effects noted.    No significant med or dietary changes.    No significant recent illness or disease state changes.      Patient acknowledges they are still an active patient of referring provider which is Uma Wise CNP Yes     PT/INR done via POC meter per protocol.  INR was supratherapeutic at 3.4.  (goal 2 - 3)  No cause identified for this increase in the INR  The last INR was 2.8 with no change made in the warfarin dose  Warfarin regimen will be continued at current dose 5 mg MWF and 7.5 mg all other days.  Will retest in 2 weeks.    Patient understands dosing directions and information discussed.  Dosing schedule and follow up appointment given to patient. Progress note routed to referring physicians office.  Patient acknowledges working in Consult Agreement with Pharmacist as referred by his/her physician/provider.      For Pharmacy Admin Tracking Only    Intervention Detail: Adherence Monitorin  Total # of Interventions Recommended: 0  Total # of Interventions Accepted: 0  Time Spent (min): 20

## 2024-12-30 RX ORDER — CARVEDILOL 25 MG/1
TABLET ORAL
Qty: 180 TABLET | Refills: 1 | Status: SHIPPED | OUTPATIENT
Start: 2024-12-30

## 2025-01-08 ENCOUNTER — OFFICE VISIT (OUTPATIENT)
Dept: FAMILY MEDICINE CLINIC | Age: 80
End: 2025-01-08
Payer: COMMERCIAL

## 2025-01-08 VITALS
OXYGEN SATURATION: 95 % | SYSTOLIC BLOOD PRESSURE: 140 MMHG | RESPIRATION RATE: 18 BRPM | HEART RATE: 61 BPM | TEMPERATURE: 97.8 F | BODY MASS INDEX: 24.54 KG/M2 | WEIGHT: 171 LBS | DIASTOLIC BLOOD PRESSURE: 74 MMHG

## 2025-01-08 DIAGNOSIS — R06.01 ORTHOPNEA: Primary | ICD-10-CM

## 2025-01-08 DIAGNOSIS — I50.9 CONGESTIVE HEART FAILURE, UNSPECIFIED HF CHRONICITY, UNSPECIFIED HEART FAILURE TYPE (HCC): ICD-10-CM

## 2025-01-08 PROCEDURE — 99213 OFFICE O/P EST LOW 20 MIN: CPT | Performed by: NURSE PRACTITIONER

## 2025-01-08 PROCEDURE — 3077F SYST BP >= 140 MM HG: CPT | Performed by: NURSE PRACTITIONER

## 2025-01-08 PROCEDURE — 1123F ACP DISCUSS/DSCN MKR DOCD: CPT | Performed by: NURSE PRACTITIONER

## 2025-01-08 PROCEDURE — 1159F MED LIST DOCD IN RCRD: CPT | Performed by: NURSE PRACTITIONER

## 2025-01-08 PROCEDURE — 3078F DIAST BP <80 MM HG: CPT | Performed by: NURSE PRACTITIONER

## 2025-01-08 RX ORDER — SACUBITRIL AND VALSARTAN 24; 26 MG/1; MG/1
1 TABLET, FILM COATED ORAL 2 TIMES DAILY
COMMUNITY

## 2025-01-08 ASSESSMENT — ENCOUNTER SYMPTOMS
CHOKING: 0
SPUTUM PRODUCTION: 0
WHEEZING: 0
ABDOMINAL PAIN: 0
SHORTNESS OF BREATH: 1
SWOLLEN GLANDS: 0
RHINORRHEA: 0
CHEST TIGHTNESS: 0
SORE THROAT: 0
HEMOPTYSIS: 0
STRIDOR: 0
VOMITING: 0
ORTHOPNEA: 0
COUGH: 0

## 2025-01-08 NOTE — PATIENT INSTRUCTIONS
Per Dr. Morocho instructions:  Start losartan (cozaar) today  Take extra dose lasix for the next 3 days. 20 mg

## 2025-01-08 NOTE — PROGRESS NOTES
(D3 ADULT PO) Take by mouth      CINNAMON PO Take by mouth      Coenzyme Q10-Vitamin E (QUNOL ULTRA COQ10 PO) Take 1 capsule by mouth See Admin Instructions Takes about four days a week (Patient not taking: Reported on 1/8/2025)       No current facility-administered medications for this visit.     No Known Allergies    Subjective:      Review of Systems   Constitutional: Negative.  Negative for fever.   HENT:  Negative for ear pain, rhinorrhea and sore throat.    Respiratory:  Positive for shortness of breath. Negative for cough, hemoptysis, sputum production, choking, chest tightness, wheezing and stridor.    Cardiovascular: Negative.  Negative for chest pain, orthopnea, claudication, leg swelling, syncope and PND.   Gastrointestinal:  Negative for abdominal pain and vomiting.   Musculoskeletal:  Negative for neck pain.   Skin:  Negative for rash.   Neurological:  Negative for headaches.   All other systems reviewed and are negative.    14 systems reviewed and negative except as listed in HPI.    Objective:     Physical Exam  Vitals and nursing note reviewed.   Constitutional:       General: He is not in acute distress.     Appearance: Normal appearance. He is not ill-appearing, toxic-appearing or diaphoretic.   HENT:      Head: Normocephalic and atraumatic.      Right Ear: External ear normal.      Left Ear: External ear normal.   Eyes:      General: No scleral icterus.        Right eye: No discharge.         Left eye: No discharge.      Conjunctiva/sclera: Conjunctivae normal.   Cardiovascular:      Rate and Rhythm: Normal rate.   Pulmonary:      Effort: Pulmonary effort is normal. No respiratory distress.      Breath sounds: Normal breath sounds. No stridor. No wheezing, rhonchi or rales.   Chest:      Chest wall: No tenderness.   Abdominal:      General: Bowel sounds are normal.      Palpations: Abdomen is soft.   Musculoskeletal:      Cervical back: Neck supple.      Comments: Gait steady   Skin:     General:

## 2025-01-10 ENCOUNTER — ANTI-COAG VISIT (OUTPATIENT)
Age: 80
End: 2025-01-10
Payer: COMMERCIAL

## 2025-01-10 ENCOUNTER — TELEPHONE (OUTPATIENT)
Age: 80
End: 2025-01-10

## 2025-01-10 LAB
INTERNATIONAL NORMALIZATION RATIO, POC: 2.3
PROTHROMBIN TIME, POC: 27.2

## 2025-01-10 PROCEDURE — 99211 OFF/OP EST MAY X REQ PHY/QHP: CPT

## 2025-01-10 PROCEDURE — 85610 PROTHROMBIN TIME: CPT

## 2025-01-10 NOTE — TELEPHONE ENCOUNTER
Called patient due to them not showing up for their appt today in the [facility].  Left message for them to call back to reschedule their appt. Indicated importance of keeping up on INR monitoring.   Please,note pt's phone number is not in service, left voice mail on wife's phone (HIPAA approved)   Kaylan Carolina PharmD, BCPS 1/10/2025 11:10 AM

## 2025-01-10 NOTE — PROGRESS NOTES
Patient seen in person in Medication Management Service.    Patient states compliant all of the time with regimen.    No bleeding or thromboembolic side effects noted.    No significant med or dietary changes.  Pt stopped using Entresto and started Losartan 50 mg daily.   No significant recent illness or disease state changes.  Thinks he might have had some drinks for isai and that is why INR was higher at the last visit.     Patient acknowledges they are still an active patient of referring provider which is Billie  Yes     PT/INR done via POC meter per protocol.  INR was therapeutic at 2.3.  (goal 2 - 3)    Warfarin regimen will be continued at current dose 5 mg MWF and 7.5 mg all other days.  Will retest in 3 weeks.    Patient understands dosing directions and information discussed.  Dosing schedule and follow up appointment given to patient. Progress note routed to referring physicians office.  Patient acknowledges working in Consult Agreement with Pharmacist as referred by his/her physician/provider.      For Pharmacy Admin Tracking Only    Intervention Detail: Adherence Monitorin  Total # of Interventions Recommended: 1  Total # of Interventions Accepted: 1  Time Spent (min): 20    Kaylan RuedaD, BCPS 1/10/2025 12:11 PM

## 2025-01-31 ENCOUNTER — ANTI-COAG VISIT (OUTPATIENT)
Age: 80
End: 2025-01-31
Payer: COMMERCIAL

## 2025-01-31 LAB
INTERNATIONAL NORMALIZATION RATIO, POC: 2 (ref 2–3)
PROTHROMBIN TIME, POC: 23.9

## 2025-01-31 PROCEDURE — 85610 PROTHROMBIN TIME: CPT

## 2025-01-31 PROCEDURE — 99211 OFF/OP EST MAY X REQ PHY/QHP: CPT

## 2025-01-31 NOTE — PROGRESS NOTES
Patient seen in person in Medication Management Service.    Patient states compliant all of the time with regimen.    No bleeding or thromboembolic side effects noted.    No significant med or dietary changes.    No significant recent illness or disease state changes.      Patient acknowledges they are still an active patient of referring provider which is Billie Yes     PT/INR done via POC meter per protocol.  INR was therapeutic at 2.  (goal 2 - 3)  INR of 2 at lower end of therapeutic range  The last INR was 2.3 with no change made in the warfarin dose    Warfarin regimen will be continued at current dose 5 mg MWF and 7.5 mg all other days.  Will retest in 4 weeks.    Patient understands dosing directions and information discussed.  Dosing schedule and follow up appointment given to patient. Progress note routed to referring physicians office.  Patient acknowledges working in Consult Agreement with Pharmacist as referred by his/her physician/provider.      For Pharmacy Admin Tracking Only    Intervention Detail: Adherence Monitorin  Total # of Interventions Recommended: 0  Total # of Interventions Accepted: 0  Time Spent (min): 20

## 2025-02-10 ENCOUNTER — TELEPHONE (OUTPATIENT)
Age: 80
End: 2025-02-10

## 2025-02-10 RX ORDER — WARFARIN SODIUM 5 MG/1
TABLET ORAL
Qty: 122 TABLET | Refills: 5 | Status: SHIPPED | OUTPATIENT
Start: 2025-02-10 | End: 2025-02-10

## 2025-02-10 RX ORDER — WARFARIN SODIUM 5 MG/1
TABLET ORAL
Qty: 135 TABLET | Refills: 1 | Status: SHIPPED | OUTPATIENT
Start: 2025-02-10 | End: 2025-02-10

## 2025-02-10 RX ORDER — WARFARIN SODIUM 5 MG/1
TABLET ORAL
Qty: 122 TABLET | Refills: 0 | Status: SHIPPED | OUTPATIENT
Start: 2025-02-10

## 2025-02-10 NOTE — TELEPHONE ENCOUNTER
Prescription for warfarin 5 mg tablets- 5 mg MWF and 7.5 mg all other days  #90ds with 1 refill sent to [facility]. Receipt confirmed by pharmacy  Emely Berkowitz, Pharm D, BCPS, CACP  [facility]  2/10/2025 9:40 AM

## 2025-02-21 ENCOUNTER — OFFICE VISIT (OUTPATIENT)
Dept: PRIMARY CARE CLINIC | Age: 80
End: 2025-02-21
Payer: COMMERCIAL

## 2025-02-21 ENCOUNTER — HOSPITAL ENCOUNTER (OUTPATIENT)
Age: 80
Setting detail: SPECIMEN
Discharge: HOME | End: 2025-02-21

## 2025-02-21 VITALS
WEIGHT: 170 LBS | HEART RATE: 58 BPM | BODY MASS INDEX: 24.39 KG/M2 | SYSTOLIC BLOOD PRESSURE: 144 MMHG | DIASTOLIC BLOOD PRESSURE: 78 MMHG | OXYGEN SATURATION: 97 %

## 2025-02-21 DIAGNOSIS — I25.10 CORONARY ARTERY DISEASE INVOLVING NATIVE CORONARY ARTERY OF NATIVE HEART WITHOUT ANGINA PECTORIS: ICD-10-CM

## 2025-02-21 DIAGNOSIS — E78.2 MIXED HYPERLIPIDEMIA: ICD-10-CM

## 2025-02-21 DIAGNOSIS — D64.9 ANEMIA, UNSPECIFIED TYPE: ICD-10-CM

## 2025-02-21 DIAGNOSIS — L82.1 SEBORRHEIC KERATOSES: ICD-10-CM

## 2025-02-21 DIAGNOSIS — I10 ESSENTIAL HYPERTENSION: ICD-10-CM

## 2025-02-21 DIAGNOSIS — B35.1 FUNGAL NAIL INFECTION: ICD-10-CM

## 2025-02-21 DIAGNOSIS — I50.42 CHRONIC COMBINED SYSTOLIC AND DIASTOLIC HEART FAILURE (HCC): ICD-10-CM

## 2025-02-21 DIAGNOSIS — E11.9 TYPE 2 DIABETES MELLITUS WITHOUT COMPLICATION, UNSPECIFIED WHETHER LONG TERM INSULIN USE (HCC): Primary | ICD-10-CM

## 2025-02-21 DIAGNOSIS — I48.11 LONGSTANDING PERSISTENT ATRIAL FIBRILLATION (HCC): ICD-10-CM

## 2025-02-21 LAB
ALBUMIN SERPL-MCNC: 4.4 G/DL (ref 3.5–5.2)
ALBUMIN/GLOB SERPL: 1.7 {RATIO} (ref 1–2.5)
ALP SERPL-CCNC: 62 U/L (ref 40–129)
ALT SERPL-CCNC: 22 U/L (ref 10–50)
ANION GAP SERPL CALCULATED.3IONS-SCNC: 12 MMOL/L (ref 9–16)
AST SERPL-CCNC: 23 U/L (ref 10–50)
BASOPHILS # BLD: 0.07 K/UL (ref 0–0.2)
BASOPHILS NFR BLD: 1 % (ref 0–2)
BILIRUB SERPL-MCNC: 0.4 MG/DL (ref 0–1.2)
BUN SERPL-MCNC: 45 MG/DL (ref 8–23)
CALCIUM SERPL-MCNC: 9.8 MG/DL (ref 8.6–10.4)
CHLORIDE SERPL-SCNC: 107 MMOL/L (ref 98–107)
CHOLEST SERPL-MCNC: 149 MG/DL (ref 0–199)
CHOLESTEROL/HDL RATIO: 3.2
CO2 SERPL-SCNC: 23 MMOL/L (ref 20–31)
CREAT SERPL-MCNC: 1.4 MG/DL (ref 0.7–1.2)
EOSINOPHIL # BLD: 0.48 K/UL (ref 0–0.44)
EOSINOPHILS RELATIVE PERCENT: 6 % (ref 1–4)
ERYTHROCYTE [DISTWIDTH] IN BLOOD BY AUTOMATED COUNT: 15.3 % (ref 11.8–14.4)
GFR, ESTIMATED: 50 ML/MIN/1.73M2
GLUCOSE SERPL-MCNC: 271 MG/DL (ref 74–99)
HBA1C MFR BLD: 9.5 %
HCT VFR BLD AUTO: 33.3 % (ref 40.7–50.3)
HDLC SERPL-MCNC: 47 MG/DL
HGB BLD-MCNC: 10 G/DL (ref 13–17)
IMM GRANULOCYTES # BLD AUTO: 0.08 K/UL (ref 0–0.3)
IMM GRANULOCYTES NFR BLD: 1 %
LDLC SERPL CALC-MCNC: 71 MG/DL (ref 0–100)
LYMPHOCYTES NFR BLD: 0.98 K/UL (ref 1.1–3.7)
LYMPHOCYTES RELATIVE PERCENT: 13 % (ref 24–43)
MCH RBC QN AUTO: 28 PG (ref 25.2–33.5)
MCHC RBC AUTO-ENTMCNC: 30 G/DL (ref 28.4–34.8)
MCV RBC AUTO: 93.3 FL (ref 82.6–102.9)
MONOCYTES NFR BLD: 0.85 K/UL (ref 0.1–1.2)
MONOCYTES NFR BLD: 11 % (ref 3–12)
NEUTROPHILS NFR BLD: 68 % (ref 36–65)
NEUTS SEG NFR BLD: 5.09 K/UL (ref 1.5–8.1)
NRBC BLD-RTO: 0 PER 100 WBC
PLATELET # BLD AUTO: 209 K/UL (ref 138–453)
PMV BLD AUTO: 11.7 FL (ref 8.1–13.5)
POTASSIUM SERPL-SCNC: 4.8 MMOL/L (ref 3.7–5.3)
PROT SERPL-MCNC: 7 G/DL (ref 6.6–8.7)
RBC # BLD AUTO: 3.57 M/UL (ref 4.21–5.77)
RBC # BLD: ABNORMAL 10*6/UL
SODIUM SERPL-SCNC: 142 MMOL/L (ref 136–145)
TRIGL SERPL-MCNC: 155 MG/DL
VLDLC SERPL CALC-MCNC: 31 MG/DL (ref 1–30)
WBC OTHER # BLD: 7.6 K/UL (ref 3.5–11.3)

## 2025-02-21 PROCEDURE — 3078F DIAST BP <80 MM HG: CPT | Performed by: NURSE PRACTITIONER

## 2025-02-21 PROCEDURE — 99214 OFFICE O/P EST MOD 30 MIN: CPT | Performed by: NURSE PRACTITIONER

## 2025-02-21 PROCEDURE — 83036 HEMOGLOBIN GLYCOSYLATED A1C: CPT | Performed by: NURSE PRACTITIONER

## 2025-02-21 PROCEDURE — 1160F RVW MEDS BY RX/DR IN RCRD: CPT | Performed by: NURSE PRACTITIONER

## 2025-02-21 PROCEDURE — 3077F SYST BP >= 140 MM HG: CPT | Performed by: NURSE PRACTITIONER

## 2025-02-21 PROCEDURE — 1123F ACP DISCUSS/DSCN MKR DOCD: CPT | Performed by: NURSE PRACTITIONER

## 2025-02-21 PROCEDURE — 3046F HEMOGLOBIN A1C LEVEL >9.0%: CPT | Performed by: NURSE PRACTITIONER

## 2025-02-21 PROCEDURE — 1159F MED LIST DOCD IN RCRD: CPT | Performed by: NURSE PRACTITIONER

## 2025-02-21 RX ORDER — PIOGLITAZONE 30 MG/1
30 TABLET ORAL DAILY
Qty: 30 TABLET | Refills: 5 | Status: SHIPPED | OUTPATIENT
Start: 2025-02-21

## 2025-02-21 SDOH — ECONOMIC STABILITY: FOOD INSECURITY: WITHIN THE PAST 12 MONTHS, THE FOOD YOU BOUGHT JUST DIDN'T LAST AND YOU DIDN'T HAVE MONEY TO GET MORE.: NEVER TRUE

## 2025-02-21 SDOH — ECONOMIC STABILITY: FOOD INSECURITY: WITHIN THE PAST 12 MONTHS, YOU WORRIED THAT YOUR FOOD WOULD RUN OUT BEFORE YOU GOT MONEY TO BUY MORE.: NEVER TRUE

## 2025-02-21 ASSESSMENT — ENCOUNTER SYMPTOMS
ABDOMINAL PAIN: 0
DIARRHEA: 0
SHORTNESS OF BREATH: 0
BLOOD IN STOOL: 0
CONSTIPATION: 0
NAUSEA: 0
TROUBLE SWALLOWING: 0
COUGH: 0
SORE THROAT: 0
WHEEZING: 0
VOMITING: 0
SINUS PRESSURE: 0

## 2025-02-21 ASSESSMENT — PATIENT HEALTH QUESTIONNAIRE - PHQ9
SUM OF ALL RESPONSES TO PHQ QUESTIONS 1-9: 0
1. LITTLE INTEREST OR PLEASURE IN DOING THINGS: NOT AT ALL
SUM OF ALL RESPONSES TO PHQ9 QUESTIONS 1 & 2: 0
2. FEELING DOWN, DEPRESSED OR HOPELESS: NOT AT ALL
SUM OF ALL RESPONSES TO PHQ QUESTIONS 1-9: 0

## 2025-02-21 NOTE — PROGRESS NOTES
[facility]  [facility]  [address] [place] [address] [address]  [address]  [facility] [address]  Dept: [phone]  Dept Fax: [phone]    Jone Jacobs is a 80 y.o. male who presentstoday for his medical conditions/complaints as noted below.  Jone Jacobs is c/o of  Chief Complaint   Patient presents with    Nail Problem     Possible fungus on finger nails    Skin Problem     Sores on scalp not healing-x mos    Diarrhea     Believes due to Metformin-does not make it bathroom in time    Results     Asking about Echo results         HPI:     History of Present Illness  The patient presents for follow-up and evaluation of fingernail infection, lesions on scalp    He has been experiencing a persistent infection in his fingers, which is not associated with any pain or discomfort. Despite attempts to manage the condition with Vaseline and Neosporin, there has been no improvement. He has also tried antifungal treatments but currently does not have any.    He reports the presence of larger spots on his scalp, which he frequently scratches, leading to their reopening. He expresses concern about potential sun damage. He occasionally applies Vaseline to his scalp and has attempted to treat the spots with Neosporin after scratching them off, but they have recurred.    He underwent an echocardiogram last week and is seeking clarification on the results. He was informed that his heart sounds were good considering the age of his heart surgery, which was performed 17 years ago.     His A1c levels were elevated during his last visit. He is currently taking metformin twice daily, along with glipizide and Farxiga. He is interested in exploring alternative medications to metformin due to its side effect of diarrhea, which occurs immediately after taking the medication and disrupts his workday.  He endorses poor compliance with diet, admits a lot of sweets like baked goods and candy and ice cream      Hemoglobin

## 2025-02-24 ENCOUNTER — TELEPHONE (OUTPATIENT)
Dept: PRIMARY CARE CLINIC | Age: 80
End: 2025-02-24

## 2025-02-24 NOTE — TELEPHONE ENCOUNTER
Spoke with the pharmacy and informed of PCP's instructions, the pharmacy verbalized understanding of PCP instructions.

## 2025-02-24 NOTE — TELEPHONE ENCOUNTER
Pharmacist called stating they received a prescription for Terbinafine 5% solution. Pharmacist stated they only carry Terbinafine 1% cream. Would like to know if 1% cream can be filled instead of solution.

## 2025-02-27 ENCOUNTER — TELEPHONE (OUTPATIENT)
Age: 80
End: 2025-02-27

## 2025-02-27 NOTE — TELEPHONE ENCOUNTER
Patient called to reschedule appt for INR check for 2/28/25. Wife has appt at same time. Only able to come on Fridays. Requests rescheduled for 3/7/25  Emely Berkowitz Pharm D, BCPS, CACP  [facility]  2/27/2025 12:54 PM

## 2025-03-07 ENCOUNTER — ANTI-COAG VISIT (OUTPATIENT)
Age: 80
End: 2025-03-07
Payer: COMMERCIAL

## 2025-03-07 LAB
INTERNATIONAL NORMALIZATION RATIO, POC: 2.1
PROTHROMBIN TIME, POC: 25.3

## 2025-03-07 PROCEDURE — 99211 OFF/OP EST MAY X REQ PHY/QHP: CPT

## 2025-03-07 PROCEDURE — 85610 PROTHROMBIN TIME: CPT

## 2025-03-07 NOTE — PROGRESS NOTES
Patient seen in person in Medication Management Service.    Patient states compliant all of the time with regimen.    No bleeding or thromboembolic side effects noted.    No significant med or dietary changes.    No significant recent illness or disease state changes.      Patient acknowledges they are still an active patient of referring provider which is  Uma Wise Yes     PT/INR done via POC meter per protocol.  INR was therapeutic at 2.1.  (goal 2 - 3)    Warfarin regimen will be continued at current dose 5 mg Monday/Wednesday/Friday, 7.5 mg all other days.  Will retest in 4 weeks. Patient prefers Friday appointment due to work schedule.     Patient understands dosing directions and information discussed.  Dosing schedule and follow up appointment given to patient. Progress note routed to referring physicians office.  Patient acknowledges working in Consult Agreement with Pharmacist as referred by his/her physician/provider.      For Pharmacy Admin Tracking Only    Intervention Detail:   Total # of Interventions Recommended: 0  Total # of Interventions Accepted: 0  Time Spent (min): 20    Karen Diaz PharmD, [facility]

## 2025-04-11 ENCOUNTER — ANTI-COAG VISIT (OUTPATIENT)
Age: 80
End: 2025-04-11
Payer: COMMERCIAL

## 2025-04-11 LAB
INTERNATIONAL NORMALIZATION RATIO, POC: 3.4 (ref 2–3)
PROTHROMBIN TIME, POC: 40.8

## 2025-04-11 PROCEDURE — 85610 PROTHROMBIN TIME: CPT

## 2025-04-11 PROCEDURE — 99212 OFFICE O/P EST SF 10 MIN: CPT

## 2025-04-11 NOTE — PROGRESS NOTES
Patient seen in person in Medication Management Service.    Patient states compliant most of the time with regimen.    No bleeding or thromboembolic side effects noted.    No significant med or dietary changes.    No significant recent illness or disease state changes.      Patient acknowledges they are still an active patient of referring provider which is Billie Yes     PT/INR done via POC meter per protocol.  INR was supratherapeutic at 3.4.  (goal 2 - 3)  The last INR was 2.1 with no change made in the warfarin dose  The patient did have episodes of diarrhea this past week    Warfarin regimen will be continued at current dose 5 mg MWF and 7.5 mg all other days.  Will retest in 2 weeks.    Patient understands dosing directions and information discussed.  Dosing schedule and follow up appointment given to patient. Progress note routed to referring physicians office.  Patient acknowledges working in Consult Agreement with Pharmacist as referred by his/her physician/provider.      For Pharmacy Admin Tracking Only    Intervention Detail: Adherence Monitorin  Total # of Interventions Recommended: 0  Total # of Interventions Accepted: 0  Time Spent (min): 20

## 2025-04-25 ENCOUNTER — ANTI-COAG VISIT (OUTPATIENT)
Age: 80
End: 2025-04-25
Payer: COMMERCIAL

## 2025-04-25 LAB
INTERNATIONAL NORMALIZATION RATIO, POC: 3
PROTHROMBIN TIME, POC: 36.2

## 2025-04-25 PROCEDURE — 99211 OFF/OP EST MAY X REQ PHY/QHP: CPT | Performed by: PHARMACIST

## 2025-04-25 PROCEDURE — 85610 PROTHROMBIN TIME: CPT | Performed by: PHARMACIST

## 2025-04-25 RX ORDER — UBIDECARENONE 30 MG
100 CAPSULE ORAL DAILY
COMMUNITY

## 2025-04-25 NOTE — PROGRESS NOTES
Patient seen in person in Medication Management Service.    Patient states compliant all of the time with regimen.    No bleeding or thromboembolic side effects noted.    No significant med or dietary changes.    No significant recent illness or disease state changes.      Patient acknowledges they are still an active patient of referring provider which is Uma Wise Yes     PT/INR done via POC meter per protocol.  INR was therapeutic at 3.  (goal 2-3)    Warfarin regimen will be continued at current dose 5 mg Mon/Wed/Fri and 7.5 mg all other days.  Will retest in 4 weeks.    Patient understands dosing directions and information discussed.  Dosing schedule and follow up appointment given to patient. Progress note routed to referring physicians office.  Patient acknowledges working in Consult Agreement with Pharmacist as referred by his/her physician/provider.      For Pharmacy Admin Tracking Only    Intervention Detail:   Total # of Interventions Recommended: 0  Total # of Interventions Accepted: 0  Time Spent (min): 20     Emely Berkowitz, Pharm D, BCPS, CACP  [facility]  4/25/2025 7:20 AM

## 2025-05-16 ENCOUNTER — TELEPHONE (OUTPATIENT)
Age: 80
End: 2025-05-16

## 2025-05-16 RX ORDER — WARFARIN SODIUM 5 MG/1
5 TABLET ORAL SEE ADMIN INSTRUCTIONS
Qty: 120 TABLET | Refills: 1 | Status: SHIPPED | OUTPATIENT
Start: 2025-05-16

## 2025-05-23 ENCOUNTER — OFFICE VISIT (OUTPATIENT)
Dept: PRIMARY CARE CLINIC | Age: 80
End: 2025-05-23
Payer: COMMERCIAL

## 2025-05-23 ENCOUNTER — ANTI-COAG VISIT (OUTPATIENT)
Age: 80
End: 2025-05-23
Payer: COMMERCIAL

## 2025-05-23 VITALS
HEART RATE: 55 BPM | HEIGHT: 70 IN | SYSTOLIC BLOOD PRESSURE: 138 MMHG | WEIGHT: 168.2 LBS | BODY MASS INDEX: 24.08 KG/M2 | OXYGEN SATURATION: 96 % | DIASTOLIC BLOOD PRESSURE: 66 MMHG

## 2025-05-23 DIAGNOSIS — E11.9 TYPE 2 DIABETES MELLITUS WITHOUT COMPLICATION, UNSPECIFIED WHETHER LONG TERM INSULIN USE (HCC): ICD-10-CM

## 2025-05-23 DIAGNOSIS — I48.11 LONGSTANDING PERSISTENT ATRIAL FIBRILLATION (HCC): ICD-10-CM

## 2025-05-23 DIAGNOSIS — E78.2 MIXED HYPERLIPIDEMIA: Chronic | ICD-10-CM

## 2025-05-23 DIAGNOSIS — I50.42 CHRONIC COMBINED SYSTOLIC AND DIASTOLIC HEART FAILURE (HCC): ICD-10-CM

## 2025-05-23 DIAGNOSIS — Z00.00 MEDICARE ANNUAL WELLNESS VISIT, SUBSEQUENT: Primary | ICD-10-CM

## 2025-05-23 DIAGNOSIS — I25.10 CORONARY ARTERY DISEASE INVOLVING NATIVE CORONARY ARTERY OF NATIVE HEART WITHOUT ANGINA PECTORIS: ICD-10-CM

## 2025-05-23 DIAGNOSIS — J40 BRONCHITIS: ICD-10-CM

## 2025-05-23 DIAGNOSIS — I10 ESSENTIAL HYPERTENSION: Chronic | ICD-10-CM

## 2025-05-23 LAB
HBA1C MFR BLD: 11.3 %
INTERNATIONAL NORMALIZATION RATIO, POC: 3.5 (ref 2–3)
PROTHROMBIN TIME, POC: 42.6

## 2025-05-23 PROCEDURE — 99214 OFFICE O/P EST MOD 30 MIN: CPT | Performed by: NURSE PRACTITIONER

## 2025-05-23 PROCEDURE — 3078F DIAST BP <80 MM HG: CPT | Performed by: NURSE PRACTITIONER

## 2025-05-23 PROCEDURE — 1123F ACP DISCUSS/DSCN MKR DOCD: CPT | Performed by: NURSE PRACTITIONER

## 2025-05-23 PROCEDURE — G0439 PPPS, SUBSEQ VISIT: HCPCS | Performed by: NURSE PRACTITIONER

## 2025-05-23 PROCEDURE — 99212 OFFICE O/P EST SF 10 MIN: CPT

## 2025-05-23 PROCEDURE — 1159F MED LIST DOCD IN RCRD: CPT | Performed by: NURSE PRACTITIONER

## 2025-05-23 PROCEDURE — 3046F HEMOGLOBIN A1C LEVEL >9.0%: CPT | Performed by: NURSE PRACTITIONER

## 2025-05-23 PROCEDURE — 83036 HEMOGLOBIN GLYCOSYLATED A1C: CPT | Performed by: NURSE PRACTITIONER

## 2025-05-23 PROCEDURE — 3075F SYST BP GE 130 - 139MM HG: CPT | Performed by: NURSE PRACTITIONER

## 2025-05-23 PROCEDURE — 85610 PROTHROMBIN TIME: CPT

## 2025-05-23 PROCEDURE — 1160F RVW MEDS BY RX/DR IN RCRD: CPT | Performed by: NURSE PRACTITIONER

## 2025-05-23 RX ORDER — INSULIN GLARGINE 100 [IU]/ML
10 INJECTION, SOLUTION SUBCUTANEOUS NIGHTLY
Qty: 5 ADJUSTABLE DOSE PRE-FILLED PEN SYRINGE | Refills: 5 | Status: SHIPPED | OUTPATIENT
Start: 2025-05-23

## 2025-05-23 RX ORDER — ACYCLOVIR 400 MG/1
TABLET ORAL
Qty: 2 EACH | Refills: 5 | Status: SHIPPED | OUTPATIENT
Start: 2025-05-23

## 2025-05-23 RX ORDER — ACYCLOVIR 400 MG/1
TABLET ORAL
Qty: 1 EACH | Refills: 0 | Status: SHIPPED | OUTPATIENT
Start: 2025-05-23

## 2025-05-23 ASSESSMENT — PATIENT HEALTH QUESTIONNAIRE - PHQ9
1. LITTLE INTEREST OR PLEASURE IN DOING THINGS: NOT AT ALL
SUM OF ALL RESPONSES TO PHQ QUESTIONS 1-9: 0
SUM OF ALL RESPONSES TO PHQ QUESTIONS 1-9: 0
2. FEELING DOWN, DEPRESSED OR HOPELESS: NOT AT ALL
SUM OF ALL RESPONSES TO PHQ QUESTIONS 1-9: 0
SUM OF ALL RESPONSES TO PHQ QUESTIONS 1-9: 0

## 2025-05-23 NOTE — PROGRESS NOTES
Service: 5 mg MWF and 7.5 mg all other days Yes Uma Wise APRN - CNP   furosemide (LASIX) 20 MG tablet Take 1 tablet by mouth 2 times daily Yes Karen Boone APRN - NP   coenzyme Q-10 100 MG capsule Take 1 capsule by mouth daily Yes ProviderRose MD   pioglitazone (ACTOS) 30 MG tablet Take 1 tablet by mouth daily Yes Uma Wise APRN - CNP   JANTOVEN 5 MG tablet TAKE 1.5 TABLETS BY MOUTH ON SUN, MON, WED, THURS AND FRI. TAKE 1 TAB BY MOUTH ON TUE AND SAT. Yes Uma Wise APRN - CNP   ticagrelor (BRILINTA) 90 MG TABS tablet Take 1 tablet by mouth 2 times daily Yes Keith Morocho DO   dapagliflozin (FARXIGA) 10 MG tablet Take 1 tablet by mouth every morning Yes Ivelisse Khan APRN - CNP   atorvastatin (LIPITOR) 40 MG tablet Take 1 tablet by mouth nightly Yes Keith Morocho DO   losartan (COZAAR) 100 MG tablet Take 1 tablet by mouth daily Yes Keith Morocho DO   carvedilol (COREG) 25 MG tablet TAKE 1 TABLET BY MOUTH TWO TIMES A DAY WITH MEALS Yes Uma Wise APRN - CNP   Misc. Devices MISC Provide handicap placard, expires 5 years from this date 10/29/29  Medical conditions prevent the ability to walk long distances Yes Uma Wise APRN - CNP   acetaminophen (TYLENOL) 500 MG tablet Take 1 tablet by mouth Daily Yes Provider, MD Rose   spironolactone (ALDACTONE) 25 MG tablet TAKE 1 TABLET BY MOUTH EVERY DAY Yes Keith Morocho DO   Multiple Vitamins-Minerals (THERAPEUTIC MULTIVITAMIN-MINERALS) tablet Take 1 tablet by mouth daily Yes ProviderRose MD   glipiZIDE (GLUCOTROL) 10 MG tablet TAKE 1 TABLET BY MOUTH TWO TIMES A DAY BEFORE MEALS Yes Uma Wise APRN - CNP   Terbinafine HCl 5 % SOLN Brush onto affected nails daily  Patient not taking: Reported on 5/23/2025  Uma Wise APRN - CNP       CareTeam (Including outside providers/suppliers regularly involved in providing care):   Patient Care Team:  Uma Wise APRN - CNP as PCP -

## 2025-05-23 NOTE — PROGRESS NOTES
Patient seen in person in Medication Management Service.    Patient states compliant most of the time with regimen.    No bleeding or thromboembolic side effects noted.    No significant med or dietary changes.    No significant recent illness or disease state changes.      Patient acknowledges they are still an active patient of referring provider which is Billie Yes     PT/INR done via POC meter per protocol.  INR was supratherapeutic at 3.5.  (goal 2 - 3)    Warfarin regimen will be decreased to 7.5 mg Tues-Thurs-Sat and 5 mg all other days.  Will retest in 2 weeks.    Patient understands dosing directions and information discussed.  Dosing schedule and follow up appointment given to patient. Progress note routed to referring physicians office.  Patient acknowledges working in Consult Agreement with Pharmacist as referred by his/her physician/provider.      For Pharmacy Admin Tracking Only    Intervention Detail: Dose Adjustment: 1, reason: Therapy De-escalation  Total # of Interventions Recommended: 1  Total # of Interventions Accepted: 1  Time Spent (min): 20

## 2025-05-28 ENCOUNTER — TELEPHONE (OUTPATIENT)
Dept: PRIMARY CARE CLINIC | Age: 80
End: 2025-05-28

## 2025-05-28 DIAGNOSIS — E11.9 TYPE 2 DIABETES MELLITUS WITHOUT COMPLICATION, UNSPECIFIED WHETHER LONG TERM INSULIN USE (HCC): Primary | ICD-10-CM

## 2025-05-28 NOTE — TELEPHONE ENCOUNTER
Pt wife called into office stating patient needs pen needles for patient's Insulin Glargine that was sent in.     Please advise  Thanks

## 2025-06-06 ENCOUNTER — ANTI-COAG VISIT (OUTPATIENT)
Age: 80
End: 2025-06-06
Payer: COMMERCIAL

## 2025-06-06 LAB
INTERNATIONAL NORMALIZATION RATIO, POC: 2.9 (ref 2–3)
PROTHROMBIN TIME, POC: 34.6

## 2025-06-06 PROCEDURE — 99211 OFF/OP EST MAY X REQ PHY/QHP: CPT

## 2025-06-06 PROCEDURE — 85610 PROTHROMBIN TIME: CPT

## 2025-06-06 NOTE — PROGRESS NOTES
Patient seen in person in Medication Management Service.    Patient states compliant most of the time with regimen.    No bleeding or thromboembolic side effects noted.    No significant med or dietary changes.    No significant recent illness or disease state changes.      Patient acknowledges they are still an active patient of referring provider which is Billie Yes     PT/INR done via POC meter per protocol.  INR was therapeutic at 2.9.  (goal 2 - 3)    Warfarin regimen will be continued at current dose 7.5 mg Tues-Thurs-Sat and 5 mg all other days.  Will retest in 4 weeks.  The patient has begun Basaglar 10 units daily    Patient understands dosing directions and information discussed.  Dosing schedule and follow up appointment given to patient. Progress note routed to referring physicians office.  Patient acknowledges working in Consult Agreement with Pharmacist as referred by his/her physician/provider.      For Pharmacy Admin Tracking Only    Intervention Detail: Adherence Monitorin  Total # of Interventions Recommended: 0  Total # of Interventions Accepted: 0  Time Spent (min): 20

## 2025-06-27 ENCOUNTER — OFFICE VISIT (OUTPATIENT)
Dept: FAMILY MEDICINE CLINIC | Age: 80
End: 2025-06-27

## 2025-06-27 VITALS
BODY MASS INDEX: 24.05 KG/M2 | WEIGHT: 168 LBS | HEART RATE: 67 BPM | OXYGEN SATURATION: 96 % | SYSTOLIC BLOOD PRESSURE: 114 MMHG | DIASTOLIC BLOOD PRESSURE: 68 MMHG | HEIGHT: 70 IN

## 2025-06-27 DIAGNOSIS — M71.021 OLECRANON BURSA ABSCESS, RIGHT: Primary | ICD-10-CM

## 2025-06-27 DIAGNOSIS — L30.9 DERMATITIS: ICD-10-CM

## 2025-06-27 RX ORDER — MUPIROCIN 2 %
OINTMENT (GRAM) TOPICAL
Qty: 30 G | Refills: 0 | Status: SHIPPED | OUTPATIENT
Start: 2025-06-27

## 2025-06-27 NOTE — PROGRESS NOTES
[facility]  [address] [place] [address]    [facility]  [address]  [facility] [address]  Dept: [phone]    Jone Jacobs is a 80 y.o. male Established patient, who presents to the walk-in clinic today with conditions/complaints as noted below:    Chief Complaint   Patient presents with    Cyst     X3-4 days,right elbow, enlarging,denies any pain,          HPI:     Patient presents today with swelling on his R elbow. He states he first noticed the area about a week ago and it has gotten worse since. He states he does  work and he has repeatedly hit that elbow on doors, walls, etc. He denies pain, itching, redness, or drainage from the area. He states he is on blood thinners. He has not tried anything for the area.        Past Medical History:   Diagnosis Date    Chronic combined systolic and diastolic HF (heart failure) (HCC)     Diabetes (HCC)     H/O aortic valve replacement     Hyperlipidemia     Hypertension     Kidney stones     Longstanding persistent atrial fibrillation (HCC)     Moderate pulmonary hypertension (HCC)     NSTEMI (non-ST elevated myocardial infarction) ([facility])        Current Outpatient Medications   Medication Sig Dispense Refill    mupirocin (BACTROBAN) 2 % ointment Apply topically 3 times daily. 30 g 0    Insulin Pen Needle 31G X 5 MM MISC Use daily with basaglar 100 each 3    insulin glargine (BASAGLAR KWIKPEN) 100 UNIT/ML injection pen Inject 10 Units into the skin nightly 5 Adjustable Dose Pre-filled Pen Syringe 5    Continuous Glucose Sensor (DEXCOM G7 SENSOR) MISC Check glucose levels 4 times daily and as needed 2 each 5    Continuous Glucose  (DEXCOM G7 ) DONIS Check glucose levels 4 times daily and as needed 1 each 0    warfarin (COUMADIN) 5 MG tablet Take 1 tablet by mouth See Admin

## 2025-06-28 ENCOUNTER — HOSPITAL ENCOUNTER (EMERGENCY)
Age: 80
Discharge: HOME | End: 2025-06-28
Attending: EMERGENCY MEDICINE
Payer: COMMERCIAL

## 2025-06-28 VITALS
HEART RATE: 71 BPM | TEMPERATURE: 97.9 F | BODY MASS INDEX: 24.34 KG/M2 | HEIGHT: 70 IN | DIASTOLIC BLOOD PRESSURE: 81 MMHG | RESPIRATION RATE: 18 BRPM | OXYGEN SATURATION: 100 % | SYSTOLIC BLOOD PRESSURE: 129 MMHG | WEIGHT: 170 LBS

## 2025-06-28 DIAGNOSIS — M70.21 OLECRANON BURSITIS OF RIGHT ELBOW: Primary | ICD-10-CM

## 2025-06-28 PROCEDURE — 99282 EMERGENCY DEPT VISIT SF MDM: CPT

## 2025-06-28 ASSESSMENT — PAIN - FUNCTIONAL ASSESSMENT: PAIN_FUNCTIONAL_ASSESSMENT: 0-10

## 2025-06-28 ASSESSMENT — PAIN SCALES - GENERAL: PAINLEVEL_OUTOF10: 0

## 2025-06-28 ASSESSMENT — LIFESTYLE VARIABLES
HOW MANY STANDARD DRINKS CONTAINING ALCOHOL DO YOU HAVE ON A TYPICAL DAY: 1 OR 2
HOW OFTEN DO YOU HAVE A DRINK CONTAINING ALCOHOL: MONTHLY OR LESS

## 2025-06-28 NOTE — ED PROVIDER NOTES
[facility] EMERGENCY DEPARTMENT  EMERGENCY DEPARTMENT ENCOUNTER      Pt Name: Jone Jacobs  MRN: [id number]  Birthdate 1945  Date of evaluation: 6/28/25      CHIEF COMPLAINT       Chief Complaint   Patient presents with    Abscess     HISTORY OF PRESENT ILLNESS   HPI 80 y.o. male presents with c/o fluid collection on the right elbow.  Patient reports that he has had a fluid collection on the right elbow for the last month.  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  He is on anticoagulants he has a history of an aortic valve replacement.  No fevers or chills.  No redness.    REVIEW OF SYSTEMS       Review of Systems  10 systems reviewed and negative unless otherwise noted in the HPI.     PAST MEDICAL HISTORY     Past Medical History:   Diagnosis Date    Chronic combined systolic and diastolic HF (heart failure) (HCC)     Diabetes (HCC)     H/O aortic valve replacement     Hyperlipidemia     Hypertension     Kidney stones     Longstanding persistent atrial fibrillation (HCC)     Moderate pulmonary hypertension (HCC)     NSTEMI (non-ST elevated myocardial infarction) ([facility])        SURGICAL HISTORY       Past Surgical History:   Procedure Laterality Date    CARDIAC PROCEDURE N/A 9/27/2023    wilson / Left heart cath / coronary angiography /  2009 performed by Ruddy Wilson MD at [facility] CARDIAC CATH LAB    CARDIAC PROCEDURE N/A 9/27/2023    Insert stent chyna coronary performed by Ruddy Wilson MD at [facility] CARDIAC CATH LAB    CARDIAC PROCEDURE N/A 9/27/2023    Intravascular ultrasound performed by Ruddy Wilson MD at [facility] CARDIAC CATH LAB    CARDIAC VALVE REPLACEMENT  02/29/2008    freestyle aortic root heart valve    COLON SURGERY      perforate during colonoscopy       CURRENT MEDICATIONS       Previous Medications    ACETAMINOPHEN (TYLENOL) 500 MG TABLET    Take 1 tablet by mouth Daily    ATORVASTATIN  Details   mupirocin (BACTROBAN) 2 % ointment Apply topically 3 times daily., Disp-30 g, R-0, Normal      Insulin Pen Needle 31G X 5 MM MISC Disp-100 each, R-3, NormalUse daily with basaglar      insulin glargine (BASAGLAR KWIKPEN) 100 UNIT/ML injection pen Inject 10 Units into the skin nightly, Disp-5 Adjustable Dose Pre-filled Pen Syringe, R-5Normal      Continuous Glucose Sensor (DEXCOM G7 SENSOR) MISC Check glucose levels 4 times daily and as needed, Disp-2 each, R-5Normal      Continuous Glucose  (DEXCOM G7 ) DONIS Check glucose levels 4 times daily and as needed, Disp-1 each, R-0Normal      !! warfarin (COUMADIN) 5 MG tablet Take 1 tablet by mouth See Admin Instructions Dosed by [facility]tico[facility]: 5 mg MWF and 7.5 mg all other days, Disp-120 tablet, R-1Normal      furosemide (LASIX) 20 MG tablet Take 1 tablet by mouth 2 times daily, Disp-180 tablet, R-3Changing from the 40mg tabs he was cutting in half to take bidNormal      coenzyme Q-10 100 MG capsule Take 1 capsule by mouth dailyHistorical Med      pioglitazone (ACTOS) 30 MG tablet Take 1 tablet by mouth daily, Disp-30 tablet, R-5Normal      Terbinafine HCl 5 % SOLN Brush onto affected nails daily, Disp-15 g, R-1Normal      !! JANTOVEN 5 MG tablet TAKE 1.5 TABLETS BY MOUTH ON SUN, MON, WED, THURS AND FRI. TAKE 1 TAB BY MOUTH ON TUE AND SAT., Disp-122 tablet, R-0Normal      ticagrelor (BRILINTA) 90 MG TABS tablet Take 1 tablet by mouth 2 times daily, Disp-180 tablet, R-3Normal      dapagliflozin (FARXIGA) 10 MG tablet Take 1 tablet by mouth every morning, Disp-90 tablet, R-3Normal      atorvastatin (LIPITOR) 40 MG tablet Take 1 tablet by mouth nightly, Disp-90 tablet, R-3Normal      losartan (COZAAR) 100 MG tablet Take 1 tablet by mouth daily, Disp-90 tablet, R-3Normal      carvedilol (COREG) 25 MG tablet TAKE 1 TABLET BY MOUTH TWO TIMES A DAY WITH MEALS, Disp-180 tablet, R-1Normal      Misc. Devices MISC Disp-1 each,

## 2025-06-28 NOTE — ED TRIAGE NOTES
Mode of arrival (squad #, walk in, police, etc) : Walk in        Chief complaint(s): Abscess         Arrival Note (brief scenario, treatment PTA, etc).: Pt has abscess on Rt elbow x1 month. No pain. PT is AO x4, vitals assessed, care ongoing.

## 2025-06-28 NOTE — DISCHARGE INSTRUCTIONS
Monitor for redness fevers painfulness.  If the symptoms develop, it may be signs of infection.  Avoid repetitive motions, wear elbow pads.

## 2025-06-30 ENCOUNTER — TELEPHONE (OUTPATIENT)
Age: 80
End: 2025-06-30

## 2025-06-30 NOTE — TELEPHONE ENCOUNTER
To Emergency Room for Bursitis  No INR done  No discharge Prescriptions  Next clinic appt July 11th

## 2025-07-09 NOTE — PROGRESS NOTES
Patient seen in person in Medication Management Service.    Patient states {compliance:213299} with regimen.    No bleeding or thromboembolic side effects noted.    No significant med or dietary changes.    No significant recent illness or disease state changes.      Patient acknowledges they are still an active patient of referring provider which is AINSLEY Schmid {no yes:204508}     PT/INR done via POC meter per protocol.  INR was {INR TYPE:5893139498} at ***.  (goal 2 - 3)    Warfarin regimen will be {CONTINUATION:0422746920}.  Will retest in {NUMBERS 1-13:96676} {Time; days - weeks:94270}.    Patient understands dosing directions and information discussed.  Dosing schedule and follow up appointment given to patient. Progress note routed to referring physicians office.  Patient acknowledges working in Consult Agreement with Pharmacist as referred by his/her physician/provider.      {[facility]Retail Pharmacy Notes:377473391}        Karen Diaz, MarybethD, [facility]

## 2025-07-11 ENCOUNTER — ANTI-COAG VISIT (OUTPATIENT)
Age: 80
End: 2025-07-11
Payer: COMMERCIAL

## 2025-07-11 LAB
INTERNATIONAL NORMALIZATION RATIO, POC: 2.7
PROTHROMBIN TIME, POC: 32.5

## 2025-07-11 PROCEDURE — 99211 OFF/OP EST MAY X REQ PHY/QHP: CPT

## 2025-07-11 PROCEDURE — 85610 PROTHROMBIN TIME: CPT

## 2025-07-11 NOTE — PROGRESS NOTES
Patient seen in person in Medication Management Service.    Patient states compliant all of the time with regimen.    No bleeding or thromboembolic side effects noted.    No significant med or dietary changes.  Patient was seen in [facility] ED in late  for elbow bursitis. No new medications or interventions made at this time.   No significant recent illness or disease state changes.      Patient acknowledges they are still an active patient of referring provider which is Uma Salas CNP Yes     PT/INR done via POC meter per protocol.  INR was therapeutic at 2.7.  (goal 2 - 3)    Warfarin regimen will be continued at current dose 7.5 mg Tues/Thurs/Sat, 5 mg all other days.  Will retest in 4 weeks.    Patient understands dosing directions and information discussed.  Dosing schedule and follow up appointment given to patient. Progress note routed to referring physicians office.  Patient acknowledges working in Consult Agreement with Pharmacist as referred by his/her physician/provider.      For Pharmacy Admin Tracking Only    Intervention Detail: Adherence Monitorin  Total # of Interventions Recommended: 1  Total # of Interventions Accepted: 1  Time Spent (min): 20        Karen Diaz, MarybethD, [facility]

## 2025-07-15 RX ORDER — CARVEDILOL 25 MG/1
25 TABLET ORAL 2 TIMES DAILY WITH MEALS
Qty: 180 TABLET | Refills: 3 | Status: SHIPPED | OUTPATIENT
Start: 2025-07-15

## 2025-08-08 ENCOUNTER — ANTI-COAG VISIT (OUTPATIENT)
Age: 80
End: 2025-08-08
Payer: COMMERCIAL

## 2025-08-08 LAB
INTERNATIONAL NORMALIZATION RATIO, POC: 2.5
PROTHROMBIN TIME, POC: 29.8

## 2025-08-08 PROCEDURE — 85610 PROTHROMBIN TIME: CPT

## 2025-08-08 PROCEDURE — 99211 OFF/OP EST MAY X REQ PHY/QHP: CPT

## 2025-08-21 ENCOUNTER — TELEPHONE (OUTPATIENT)
Age: 80
End: 2025-08-21

## 2025-08-21 DIAGNOSIS — E11.9 TYPE 2 DIABETES MELLITUS WITHOUT COMPLICATION, UNSPECIFIED WHETHER LONG TERM INSULIN USE (HCC): ICD-10-CM

## 2025-08-21 RX ORDER — PIOGLITAZONE 30 MG/1
30 TABLET ORAL DAILY
Qty: 100 TABLET | Refills: 2 | Status: SHIPPED | OUTPATIENT
Start: 2025-08-21

## 2025-08-21 RX ORDER — WARFARIN SODIUM 5 MG/1
TABLET ORAL
Qty: 110 TABLET | Refills: 1 | Status: SHIPPED | OUTPATIENT
Start: 2025-08-21

## 2025-08-29 ENCOUNTER — OFFICE VISIT (OUTPATIENT)
Dept: PRIMARY CARE CLINIC | Age: 80
End: 2025-08-29
Payer: COMMERCIAL

## 2025-08-29 VITALS
WEIGHT: 175.6 LBS | OXYGEN SATURATION: 95 % | SYSTOLIC BLOOD PRESSURE: 122 MMHG | BODY MASS INDEX: 25.2 KG/M2 | DIASTOLIC BLOOD PRESSURE: 56 MMHG | HEART RATE: 63 BPM

## 2025-08-29 DIAGNOSIS — I25.10 CORONARY ARTERY DISEASE INVOLVING NATIVE CORONARY ARTERY OF NATIVE HEART WITHOUT ANGINA PECTORIS: ICD-10-CM

## 2025-08-29 DIAGNOSIS — I48.11 LONGSTANDING PERSISTENT ATRIAL FIBRILLATION (HCC): ICD-10-CM

## 2025-08-29 DIAGNOSIS — I10 ESSENTIAL HYPERTENSION: ICD-10-CM

## 2025-08-29 DIAGNOSIS — I25.5 ISCHEMIC CARDIOMYOPATHY: ICD-10-CM

## 2025-08-29 DIAGNOSIS — G47.9 DIFFICULTY SLEEPING: ICD-10-CM

## 2025-08-29 DIAGNOSIS — F41.9 ANXIETY: ICD-10-CM

## 2025-08-29 DIAGNOSIS — E11.9 TYPE 2 DIABETES MELLITUS WITHOUT COMPLICATION, UNSPECIFIED WHETHER LONG TERM INSULIN USE (HCC): Primary | ICD-10-CM

## 2025-08-29 DIAGNOSIS — E78.2 MIXED HYPERLIPIDEMIA: ICD-10-CM

## 2025-08-29 LAB — HBA1C MFR BLD: 10 %

## 2025-08-29 PROCEDURE — 3046F HEMOGLOBIN A1C LEVEL >9.0%: CPT | Performed by: NURSE PRACTITIONER

## 2025-08-29 PROCEDURE — 99214 OFFICE O/P EST MOD 30 MIN: CPT | Performed by: NURSE PRACTITIONER

## 2025-08-29 PROCEDURE — 3078F DIAST BP <80 MM HG: CPT | Performed by: NURSE PRACTITIONER

## 2025-08-29 PROCEDURE — 1160F RVW MEDS BY RX/DR IN RCRD: CPT | Performed by: NURSE PRACTITIONER

## 2025-08-29 PROCEDURE — 3074F SYST BP LT 130 MM HG: CPT | Performed by: NURSE PRACTITIONER

## 2025-08-29 PROCEDURE — 83036 HEMOGLOBIN GLYCOSYLATED A1C: CPT | Performed by: NURSE PRACTITIONER

## 2025-08-29 PROCEDURE — 1123F ACP DISCUSS/DSCN MKR DOCD: CPT | Performed by: NURSE PRACTITIONER

## 2025-08-29 PROCEDURE — 1159F MED LIST DOCD IN RCRD: CPT | Performed by: NURSE PRACTITIONER

## 2025-08-29 RX ORDER — HYDROXYZINE HYDROCHLORIDE 25 MG/1
25 TABLET, FILM COATED ORAL EVERY 6 HOURS PRN
Qty: 120 TABLET | Refills: 3 | Status: SHIPPED | OUTPATIENT
Start: 2025-08-29

## 2025-08-29 RX ORDER — ESCITALOPRAM OXALATE 10 MG/1
10 TABLET ORAL DAILY
Qty: 30 TABLET | Refills: 3 | Status: SHIPPED | OUTPATIENT
Start: 2025-08-29

## 2025-08-29 RX ORDER — INSULIN GLARGINE 100 [IU]/ML
15 INJECTION, SOLUTION SUBCUTANEOUS NIGHTLY
Qty: 5 ADJUSTABLE DOSE PRE-FILLED PEN SYRINGE | Refills: 5 | Status: SHIPPED | OUTPATIENT
Start: 2025-08-29

## 2025-08-29 ASSESSMENT — ENCOUNTER SYMPTOMS
CONSTIPATION: 0
BLOOD IN STOOL: 0
DIARRHEA: 0
ABDOMINAL PAIN: 0
WHEEZING: 0
VOMITING: 0
SHORTNESS OF BREATH: 0
TROUBLE SWALLOWING: 0
NAUSEA: 0
SINUS PRESSURE: 0
SORE THROAT: 0
COUGH: 0

## 2025-09-05 ENCOUNTER — ANTI-COAG VISIT (OUTPATIENT)
Age: 80
End: 2025-09-05
Payer: COMMERCIAL

## 2025-09-05 LAB
INTERNATIONAL NORMALIZATION RATIO, POC: 2.7
PROTHROMBIN TIME, POC: 32.5

## 2025-09-05 PROCEDURE — 85610 PROTHROMBIN TIME: CPT | Performed by: PHARMACY

## 2025-09-05 PROCEDURE — 99211 OFF/OP EST MAY X REQ PHY/QHP: CPT | Performed by: PHARMACY

## (undated) DEVICE — ANGIOGRAPHIC CATHETER: Brand: EXPO™

## (undated) DEVICE — CATH BLLN ANGIO 2.50X20MM NC EUPHORIA RX

## (undated) DEVICE — CATHETER GUID 6FR L100CM DIA0.071IN NYL SHFT EBU3.5

## (undated) DEVICE — Device: Brand: EAGLE EYE PLATINUM RX DIGITAL IVUS CATHETER

## (undated) DEVICE — GUIDEWIRE 35/260/FC/PTFE/3J: Brand: GUIDEWIRE

## (undated) DEVICE — RUNTHROUGH NS EXTRA FLOPPY PTCA GUIDEWIRE: Brand: RUNTHROUGH

## (undated) DEVICE — CATH BLLN ANGIO 2.50X15MM SC EUPHORA RX

## (undated) DEVICE — CATH BLLN ANGIO 3X15MM NC EUPHORIA RX

## (undated) DEVICE — SURGICAL PROCEDURE TRAY CRD CATH SVMMC

## (undated) DEVICE — GUIDE CATHETER: Brand: MACH1™

## (undated) DEVICE — INTRODUCER SHTH 6FR L11CM 0.038IN STD SIDEPRT EXTN 3 W

## (undated) DEVICE — ANGIO-SEAL VIP VASCULAR CLOSURE DEVICE: Brand: ANGIO-SEAL

## (undated) DEVICE — CATH BLLN ANGIO 3X15MM SC EUPHORA RX

## (undated) DEVICE — CATH BLLN ANGIO 4.50X12MM NC EUPHORIA RX